# Patient Record
Sex: FEMALE | Race: WHITE | Employment: FULL TIME | URBAN - METROPOLITAN AREA
[De-identification: names, ages, dates, MRNs, and addresses within clinical notes are randomized per-mention and may not be internally consistent; named-entity substitution may affect disease eponyms.]

---

## 2017-01-07 DIAGNOSIS — E03.9 HYPOTHYROIDISM, UNSPECIFIED TYPE: Primary | ICD-10-CM

## 2017-01-09 ENCOUNTER — TELEPHONE (OUTPATIENT)
Dept: OBGYN | Facility: CLINIC | Age: 47
End: 2017-01-09

## 2017-01-09 RX ORDER — LEVOTHYROXINE SODIUM 88 UG/1
TABLET ORAL
Qty: 90 TABLET | Refills: 0 | Status: SHIPPED | OUTPATIENT
Start: 2017-01-09 | End: 2017-02-15

## 2017-01-09 NOTE — TELEPHONE ENCOUNTER
Levothyroxine 88mcg      Last Written Prescription Date:  4/14/16  Last Fill Quantity: 90,   # refills: 2  Last Office Visit with [facility] primary care provider:  2/12/16  Future Office visit: 2/15/17    Pt has annual appt scheduled, 3 month supply sent for insurance purposes.

## 2017-01-09 NOTE — TELEPHONE ENCOUNTER
Reason for Call:  Other call back    Detailed comments: Pt taking Microgestin 1.5/30  Daily.  She is getting break through bleeding and nightsweats.  Will you up the dose before she comes in Feb?    Phone Number Patient can be reached at: Home number on file [phone] (home)    Best Time: anytime    Can we leave a detailed message on this number? YES    Call taken on 1/9/2017 at 2:49 PM by Emmy Pantoja

## 2017-01-09 NOTE — TELEPHONE ENCOUNTER
"Pt is stating she has been having breakthrough bleeding and night sweats for 4 weeks. Pt started taking microgestin continuously for at least 6 months, which was working fine until a month ago, pt did not let herself have a period at all during this time. Pt states she has been spotting every day for 4 weeks, with abdominal cramping. Pt denies heavy bleeding, pt states night sweats are \"really bad\" and she waked up \"completley drenched.\" Pt denies heavy bleeding or the passing of clots, spotting is bright red in color and is light. Note routed to Dr. Longoria to review and advise.   "

## 2017-01-10 NOTE — TELEPHONE ENCOUNTER
Spoke to pt and gave her information from Dr. Longoria note below. Pt stated understanding and had no further questions.

## 2017-01-10 NOTE — TELEPHONE ENCOUNTER
The bTB is b/c she's been doing it continuously for this long. Increasing the dose isn't going to fix this. She should stop her ocps for 7 days, allow a withdrawal bleed and start again. She is likely just at a young enough age that menopause for her isn't full blown and that daily ocps aren't going to work for 6-12 months like they do for some. She can take it continuously for as long as her body will allow her to do that and then when she starts have a lot of BTB she needs a week withdrawal. i actually think her nightsweats will imprve if she does this as well. Have her try it and call back if a week breat and then restarting doesn't work

## 2017-02-15 ENCOUNTER — OFFICE VISIT (OUTPATIENT)
Dept: OBGYN | Facility: CLINIC | Age: 47
End: 2017-02-15
Payer: COMMERCIAL

## 2017-02-15 ENCOUNTER — RADIANT APPOINTMENT (OUTPATIENT)
Dept: MAMMOGRAPHY | Facility: CLINIC | Age: 47
End: 2017-02-15
Payer: COMMERCIAL

## 2017-02-15 VITALS
DIASTOLIC BLOOD PRESSURE: 68 MMHG | SYSTOLIC BLOOD PRESSURE: 102 MMHG | BODY MASS INDEX: 27.68 KG/M2 | HEIGHT: 63 IN | WEIGHT: 156.2 LBS | HEART RATE: 66 BPM

## 2017-02-15 DIAGNOSIS — B00.1 HERPES LABIALIS: ICD-10-CM

## 2017-02-15 DIAGNOSIS — N95.1 SYMPTOMS, SUCH AS FLUSHING, SLEEPLESSNESS, HEADACHE, LACK OF CONCENTRATION, ASSOCIATED WITH THE MENOPAUSE: ICD-10-CM

## 2017-02-15 DIAGNOSIS — Z12.31 VISIT FOR SCREENING MAMMOGRAM: ICD-10-CM

## 2017-02-15 DIAGNOSIS — F33.1 MAJOR DEPRESSIVE DISORDER, RECURRENT EPISODE, MODERATE (H): ICD-10-CM

## 2017-02-15 DIAGNOSIS — G43.909 MIGRAINE WITHOUT STATUS MIGRAINOSUS, NOT INTRACTABLE, UNSPECIFIED MIGRAINE TYPE: ICD-10-CM

## 2017-02-15 DIAGNOSIS — Z30.41 ORAL CONTRACEPTIVE PILL SURVEILLANCE: ICD-10-CM

## 2017-02-15 DIAGNOSIS — Z01.419 ENCOUNTER FOR GYNECOLOGICAL EXAMINATION WITHOUT ABNORMAL FINDING: Primary | ICD-10-CM

## 2017-02-15 DIAGNOSIS — E03.9 HYPOTHYROIDISM, UNSPECIFIED TYPE: ICD-10-CM

## 2017-02-15 PROCEDURE — 36415 COLL VENOUS BLD VENIPUNCTURE: CPT | Performed by: OBSTETRICS & GYNECOLOGY

## 2017-02-15 PROCEDURE — G0202 SCR MAMMO BI INCL CAD: HCPCS | Mod: TC

## 2017-02-15 PROCEDURE — 99396 PREV VISIT EST AGE 40-64: CPT | Performed by: OBSTETRICS & GYNECOLOGY

## 2017-02-15 PROCEDURE — 84443 ASSAY THYROID STIM HORMONE: CPT | Performed by: OBSTETRICS & GYNECOLOGY

## 2017-02-15 ASSESSMENT — ANXIETY QUESTIONNAIRES
5. BEING SO RESTLESS THAT IT IS HARD TO SIT STILL: NOT AT ALL
7. FEELING AFRAID AS IF SOMETHING AWFUL MIGHT HAPPEN: NOT AT ALL
1. FEELING NERVOUS, ANXIOUS, OR ON EDGE: NOT AT ALL
6. BECOMING EASILY ANNOYED OR IRRITABLE: NOT AT ALL
IF YOU CHECKED OFF ANY PROBLEMS ON THIS QUESTIONNAIRE, HOW DIFFICULT HAVE THESE PROBLEMS MADE IT FOR YOU TO DO YOUR WORK, TAKE CARE OF THINGS AT HOME, OR GET ALONG WITH OTHER PEOPLE: NOT DIFFICULT AT ALL
GAD7 TOTAL SCORE: 0
3. WORRYING TOO MUCH ABOUT DIFFERENT THINGS: NOT AT ALL
2. NOT BEING ABLE TO STOP OR CONTROL WORRYING: NOT AT ALL

## 2017-02-15 ASSESSMENT — PATIENT HEALTH QUESTIONNAIRE - PHQ9: 5. POOR APPETITE OR OVEREATING: NOT AT ALL

## 2017-02-15 NOTE — PROGRESS NOTES
Shelby is a 46 year old  female who presents for annual exam.     Besides routine health maintenance,  she would like to discuss night sweats and weight gain. Also needs refill on medications.    HPI:  The patient's PCP is Tessa Weaver PA-C.    Patient is doing fairly well. General health is fine. Needs TSH checked today for refill on meds.  Just had fasting labs done through work and though still relatively normal the LDL is a little higher than in years past.  Patient is upset b/c feels like keeps gaining weight. However was this weight 2 yrs ago and then started eating healthy, portion control and exercise and does admit has fallen off from that recently and needs to get back on board.  Patient was having lots of hotflashes and night sweats. Started her on daily cyclic HRT and then daily HRT and that completely resolved her menopausal sx but had lots of DUB. We then put her on junel 1/20 and her menopausal sx weren't as well controlled, mostly night sweats but menses were regular. Bumped her up to . and still getting multiple time a night drenching sweats. Takes her ocp in the morning. Hasn't tried taking it at night yet.  No vaginal dryness or dyspareunia. Still gets daily creamy white d/c but realizes that just her new normal.  Needs refill on her valtrex for cold sores and migraine meds as well.  Mood is fine on 150mg sertraline but just feels really low energy. Not sure how much is her RA or her weight gain, or menopause and poor sleep.   Her daughters are doing fine and her 2 stepkids as well.      GYNECOLOGIC HISTORY:    Patient's last menstrual period was 2017 (exact date).  Her current contraception method is: oral contraceptives and Essure.  She  reports that she has never smoked. She has never used smokeless tobacco.    Patient is sexually active.  STD testing offered?  Declined  Last PHQ-9 score on record =   PHQ-9 SCORE 2/15/2017   Total Score -   Total Score 5     Last GAD7  score on record =   MOOSE-7 SCORE 2/15/2017   Total Score 0     Alcohol Score = 2    HEALTH MAINTENANCE:  Cholesterol: 16  Total= 214, Triglycerides=89, HDL=63, EYO=762, FBS=88, TSH=16-0.96 -gets lipids/fbs done at work. Thyroid testing today  Last Mammo: 16, Result: normal, Next Mammo: today   Pap: 14 neg, HPV-  Colonoscopy:  NA  Dexa:  Never    Health maintenance updated:  yes    HISTORY:  Obstetric History       T2      TAB0   SAB0   E0   M0   L2       # Outcome Date GA Lbr Ramu/2nd Weight Sex Delivery Anes PTL Lv   2 Term 89    F    Y   1 Term 87    F    Y          Patient Active Problem List   Diagnosis     Hypothyroidism     CARDIOVASCULAR SCREENING; LDL GOAL LESS THAN 160     Cluster headache     Anxiety     Mild major depression (H)     Dizziness     Low back pain     Hyperhidrosis of soles     AS (ankylosing spondylitis) (H)     Abdominal pain, lower     Herpes labialis     Major depression     Sjogren's syndrome (H)     TMJ (temporomandibular joint syndrome)     Herpes zoster     OA (osteoarthritis)     Post-menopause on HRT (hormone replacement therapy)     Symptoms, such as flushing, sleeplessness, headache, lack of concentration, associated with the menopause     Past Surgical History   Procedure Laterality Date     Mammoplasty augmentation       Rotator cuff repair rt/lt       Hc hysteros w permanent fallopain implant  2010     By Dr. Longoria     Hc hysterosalpingogram  12/28/10     post essure bilateral tubal occlusion      Social History   Substance Use Topics     Smoking status: Never Smoker     Smokeless tobacco: Never Used     Alcohol use 0.0 oz/week     0 Standard drinks or equivalent per week      Comment: occ       Problem (# of Occurrences) Relation (Name,Age of Onset)    CANCER (1) Brother: panc ca    Coronary Artery Disease (1) Father: mi, PVD     DIABETES (1) Maternal Grandfather    Hypertension (2) Father, Mother    Other  "Cancer (1) Father: skin ca    Rheumatoid Arthritis (2) Father, Sister: passed away    Substance Abuse (1) Brother: alcohol    Thyroid Disease (1) Maternal Grandfather            Current Outpatient Prescriptions   Medication Sig     norethindrone-ethinyl estradiol (MICROGESTIN 1.5/30) 1.5-30 MG-MCG per tablet Take 1 tablet by mouth daily Continuously     levothyroxine (SYNTHROID/LEVOTHROID) 88 MCG tablet TAKE 1 TABLET(88 MCG) BY MOUTH DAILY     sertraline (ZOLOFT) 100 MG tablet Take 1.5 tablets (150 mg) by mouth daily     SUMAtriptan (IMITREX) 100 MG tablet Take 1 tablet (100 mg) by mouth at onset of headache     valACYclovir (VALTREX) 1000 mg tablet Take 2 tablets (2,000 mg) by mouth 2 times daily     fluticasone (FLONASE) 50 MCG/ACT nasal spray Spray 1-2 sprays into both nostrils daily     cevimeline (EVOXAC) 30 MG capsule Take 30 mg by mouth 3 times daily as needed     omeprazole 20 MG tablet Take 20 mg by mouth daily     Cholecalciferol (VITAMIN D PO)      No current facility-administered medications for this visit.      Allergies   Allergen Reactions     Penicillins Rash       Past medical, surgical, social and family histories were reviewed and updated in EPIC.    ROS:   12 point review of systems negative other than symptoms noted below.  Constitutional: Weight Gain  Genitourinary: Hot Flashes and Night Sweats    EXAM:  /68  Pulse 66  Ht 5' 3\" (1.6 m)  Wt 156 lb 3.2 oz (70.9 kg)  LMP 01/13/2017 (Exact Date)  BMI 27.67 kg/m2   BMI: Body mass index is 27.67 kg/(m^2).    PHYSICAL EXAM:  Constitutional:  Appearance: Well nourished, well developed, alert, in no acute distress  Neck:  Lymph Nodes:  No lymphadenopathy present    Thyroid:  Gland size normal, nontender, no nodules or masses present  on palpation  Chest:  Respiratory Effort:  Breathing unlabored  Cardiovascular:    Heart: Auscultation:  Regular rate, normal rhythm, no murmurs present  Breasts: Inspection of Breasts:  No lymphadenopathy " present    Palpation of Breasts and Axillae:  No masses present on palpation, no  breast tenderness    Axillary Lymph Nodes:  No lymphadenopathy present  Gastrointestinal:   Abdominal Examination:  Abdomen nontender to palpation, tone normal without rigidity or guarding, no masses present, umbilicus without lesions   Liver and Spleen:  No hepatomegaly present, liver nontender to palpation    Hernias:  No hernias present  Lymphatic: Lymph Nodes:  No other lymphadenopathy present  Skin:  General Inspection:  No rashes present, no lesions present, no areas of  discoloration    Genitalia and Groin:  No rashes present, no lesions present, no areas of  discoloration, no masses present  Neurologic/Psychiatric:    Mental Status:  Oriented X3     Pelvic Exam:  External Genitalia:     Normal appearance for age, no discharge present, no tenderness present, no inflammatory lesions present, color normal  Vagina:     Normal vaginal vault without central or paravaginal defects, no discharge present, no inflammatory lesions present, no masses present  Bladder:     Nontender to palpation  Urethra:   Urethral Body:  Urethra palpation normal, urethra structural support normal   Urethral Meatus:  No erythema or lesions present  Cervix:     Appearance healthy, no lesions present, nontender to palpation, no bleeding present  Uterus:     Nontender to palpation, no masses present, position anteflexed, mobility: normal  Adnexa:     No adnexal tenderness present, no adnexal masses present  Perineum:     Perineum within normal limits, no evidence of trauma, no rashes or skin lesions present  Anus:     Anus within normal limits, no hemorrhoids present  Inguinal Lymph Nodes:     No lymphadenopathy present  Pubic Hair:     Normal pubic hair distribution for age  Genitalia and Groin:     No rashes present, no lesions present, no areas of discoloration, no masses present    COUNSELING:   Reviewed preventive health counseling, as reflected in  patient instructions  Special attention given to:        Regular exercise       Healthy diet/nutrition       (Rani)menopause management    BMI: Body mass index is 27.67 kg/(m^2).  Weight management plan: Discussed healthy diet and exercise guidelines and patient will follow up in 12 months in clinic to re-evaluate.    ASSESSMENT:  46 year old female with satisfactory annual exam.    ICD-10-CM    1. Encounter for gynecological examination without abnormal finding Z01.419    2. Hypothyroidism, unspecified type E03.9 levothyroxine (SYNTHROID/LEVOTHROID) 88 MCG tablet     TSH   3. Major depressive disorder, recurrent episode, moderate (H) F33.1 sertraline (ZOLOFT) 100 MG tablet   4. Herpes labialis B00.1 valACYclovir (VALTREX) 1000 mg tablet   5. Migraine without status migrainosus, not intractable, unspecified migraine type G43.909 SUMAtriptan (IMITREX) 100 MG tablet   6. Oral contraceptive pill surveillance Z30.41    7. Symptoms, such as flushing, sleeplessness, headache, lack of concentration, associated with the menopause N95.1        PLAN:  Pap is UTD for 2 more years  mammo today  TSH today and should either repeat fasting labs with us next year or through employer  Will try to take her ocps qhs for the next 7-10 days and see if nightsweats are any better. If they're not will call us and we can either try to do a small dose of vivelle .05mg patch on top of the ocp 2x/week even through placebo week or we could try neurontin at bedtime to not increase our hormones. However patient is fairly young and some HRT on top of ocp should be fine. Did discuss pros/cons of these two approaches.  Refill on valtrex, sertraline and imitrex sent.  Encouraged to resume exercise and will see if with that and improved sleep her energy improves.    Treesa Longoria MD

## 2017-02-15 NOTE — MR AVS SNAPSHOT
After Visit Summary   2/15/2017    Shelby Asencio    MRN: [id number]           Patient Information     Date Of Birth          1970        Visit Information        Provider Department      2/15/2017 2:00 PM Teresa Longoria MD [facility]        Today's Diagnoses     Encounter for gynecological examination without abnormal finding    -  1    Hypothyroidism, unspecified type        Major depressive disorder, recurrent episode, moderate (H)        Herpes labialis        Migraine without status migrainosus, not intractable, unspecified migraine type        Oral contraceptive pill surveillance        Symptoms, such as flushing, sleeplessness, headache, lack of concentration, associated with the menopause           Follow-ups after your visit        Who to contact     If you have questions or need follow up information about today's clinic visit or your schedule please contact [facility] directly at [phone].  Normal or non-critical lab and imaging results will be communicated to you by [employer], letter or phone within 4 business days after the clinic has received the results. If you do not hear from us within 7 days, please contact the clinic through [employer] or phone. If you have a critical or abnormal lab result, we will notify you by phone as soon as possible.  Submit refill requests through [employer] or call your pharmacy and they will forward the refill request to us. Please allow 3 business days for your refill to be completed.          Additional Information About Your Visit        MyChart Information     [employer] gives you secure access to your electronic health record. If you see a primary care provider, you can also send messages to your care team and make appointments. If you have questions, please call your primary care clinic.  If you do not have a primary care provider, please call [phone] and they will assist you.        Care EveryWhere ID  "    This is your Care EveryWhere ID. This could be used by other organizations to access your [place] medical records  [id number]        Your Vitals Were     Pulse Height Last Period BMI (Body Mass Index)          66 5' 3\" (1.6 m) 01/13/2017 (Exact Date) 27.67 kg/m2         Blood Pressure from Last 3 Encounters:   02/15/17 102/68   04/29/16 110/70   02/12/16 112/72    Weight from Last 3 Encounters:   02/15/17 156 lb 3.2 oz (70.9 kg)   04/29/16 146 lb (66.2 kg)   02/12/16 144 lb (65.3 kg)              We Performed the Following     TSH          Today's Medication Changes          These changes are accurate as of: 2/15/17 11:59 PM.  If you have any questions, ask your nurse or doctor.               These medicines have changed or have updated prescriptions.        Dose/Directions    levothyroxine 88 MCG tablet   Commonly known as:  SYNTHROID/LEVOTHROID   This may have changed:  See the new instructions.   Used for:  Hypothyroidism, unspecified type   Changed by:  Teresa Longoria MD        TAKE 1 TABLET(88 MCG) BY MOUTH DAILY   Quantity:  90 tablet   Refills:  3         Stop taking these medicines if you haven't already. Please contact your care team if you have questions.     norethindrone-ethinyl estradiol 1-20 MG-MCG per tablet   Commonly known as:  JUNEL FE 1/20   Stopped by:  Teresa Longoria MD                Where to get your medicines      These medications were sent to [facility] [address]  [address]  [address] [facility] [place] [address]     Phone:  [phone]     levothyroxine 88 MCG tablet    norethindrone-ethinyl estradiol 1.5-30 MG-MCG per tablet    sertraline 100 MG tablet    SUMAtriptan 100 MG tablet    valACYclovir 1000 mg tablet                Primary Care Provider Office Phone # Fax #    Tessa Weaver PA-C [phone]       [facility] [address]  [facility] [address]        Thank you!     Thank you " for choosing [facility]  for your care. Our goal is always to provide you with excellent care. Hearing back from our patients is one way we can continue to improve our services. Please take a few minutes to complete the written survey that you may receive in the mail after your visit with us. Thank you!             Your Updated Medication List - Protect others around you: Learn how to safely use, store and throw away your medicines at www.disposemymeds.org.          This list is accurate as of: 2/15/17 11:59 PM.  Always use your most recent med list.                   Brand Name Dispense Instructions for use    cevimeline 30 MG capsule    EVOXAC     Take 30 mg by mouth 3 times daily as needed       fluticasone 50 MCG/ACT spray    FLONASE    1 Package    Spray 1-2 sprays into both nostrils daily       levothyroxine 88 MCG tablet    SYNTHROID/LEVOTHROID    90 tablet    TAKE 1 TABLET(88 MCG) BY MOUTH DAILY       norethindrone-ethinyl estradiol 1.5-30 MG-MCG per tablet    MICROGESTIN 1.5/30    112 tablet    Take 1 tablet by mouth daily Continuously       omeprazole 20 MG tablet      Take 20 mg by mouth daily       sertraline 100 MG tablet    ZOLOFT    135 tablet    Take 1.5 tablets (150 mg) by mouth daily       SUMAtriptan 100 MG tablet    IMITREX    8 tablet    Take 1 tablet (100 mg) by mouth at onset of headache       valACYclovir 1000 mg tablet    VALTREX    12 tablet    Take 2 tablets (2,000 mg) by mouth 2 times daily       VITAMIN D PO

## 2017-02-16 LAB — TSH SERPL DL<=0.05 MIU/L-ACNC: 1.82 MU/L (ref 0.4–4)

## 2017-02-16 RX ORDER — SUMATRIPTAN 100 MG/1
100 TABLET, FILM COATED ORAL
Qty: 8 TABLET | Refills: 3 | Status: SHIPPED | OUTPATIENT
Start: 2017-02-16 | End: 2019-02-27

## 2017-02-16 RX ORDER — NORETHINDRONE ACETATE AND ETHINYL ESTRADIOL .03; 1.5 MG/1; MG/1
1 TABLET ORAL DAILY
Qty: 112 TABLET | Refills: 3 | Status: SHIPPED | OUTPATIENT
Start: 2017-02-16 | End: 2018-02-21

## 2017-02-16 RX ORDER — VALACYCLOVIR HYDROCHLORIDE 1 G/1
2000 TABLET, FILM COATED ORAL 2 TIMES DAILY
Qty: 12 TABLET | Refills: 6 | Status: SHIPPED | OUTPATIENT
Start: 2017-02-16 | End: 2019-02-27

## 2017-02-16 RX ORDER — SERTRALINE HYDROCHLORIDE 100 MG/1
150 TABLET, FILM COATED ORAL DAILY
Qty: 135 TABLET | Refills: 3 | Status: SHIPPED | OUTPATIENT
Start: 2017-02-16 | End: 2018-02-21

## 2017-02-16 RX ORDER — LEVOTHYROXINE SODIUM 88 UG/1
TABLET ORAL
Qty: 90 TABLET | Refills: 3 | Status: SHIPPED | OUTPATIENT
Start: 2017-02-16 | End: 2018-02-22

## 2017-02-16 ASSESSMENT — ANXIETY QUESTIONNAIRES: GAD7 TOTAL SCORE: 0

## 2017-02-16 ASSESSMENT — PATIENT HEALTH QUESTIONNAIRE - PHQ9: SUM OF ALL RESPONSES TO PHQ QUESTIONS 1-9: 5

## 2017-02-22 ENCOUNTER — TRANSFERRED RECORDS (OUTPATIENT)
Dept: HEALTH INFORMATION MANAGEMENT | Facility: CLINIC | Age: 47
End: 2017-02-22

## 2017-03-21 ENCOUNTER — TELEPHONE (OUTPATIENT)
Dept: NURSING | Facility: CLINIC | Age: 47
End: 2017-03-21

## 2017-03-21 DIAGNOSIS — R23.2 HOT FLASHES: Primary | ICD-10-CM

## 2017-03-21 NOTE — TELEPHONE ENCOUNTER
Pt calling saw Dr. Longoria for her annual in February and informed her that she was having terrible night sweats, she was recommended to take the pill at night, has done so for a month and has not noticed any change, is still waking up at night soaking wet. Routing to Dr. Longoria to see what option she would prefer (note below). Pt indicates she would like to do whichever one has the less risks or side effects. Pt aware Dr. Longoria in surgery today and back tomorrow. Correct pharmacy is selected. Routing to Dr. Longoria.    Annual with Dr. Longoria on 2/15/17:  Will try to take her ocps qhs for the next 7-10 days and see if nightsweats are any better. If they're not will call us and we can either try to do a small dose of vivelle .05mg patch on top of the ocp 2x/week even through placebo week or we could try neurontin at bedtime to not increase our hormones. However patient is fairly young and some HRT on top of ocp should be fine. Did discuss pros/cons of these two approaches.

## 2017-03-22 RX ORDER — ESTRADIOL 0.05 MG/D
1 PATCH, EXTENDED RELEASE TRANSDERMAL
Qty: 24 PATCH | Refills: 0 | Status: SHIPPED | OUTPATIENT
Start: 2017-03-23 | End: 2017-11-15

## 2017-03-22 NOTE — TELEPHONE ENCOUNTER
Let's have her try adding the vivelle .05mg patch 2 patches per week (every 3.5 days) throughout the entire course of her ocps. If has DUB will change the timing and/or the plan in general.

## 2017-03-22 NOTE — TELEPHONE ENCOUNTER
Called pt, informed of note below, Rx sent to pt;s pharmacy. Pt stated understanding and had no further questions.

## 2017-04-08 DIAGNOSIS — E03.9 HYPOTHYROIDISM, UNSPECIFIED TYPE: ICD-10-CM

## 2017-04-10 RX ORDER — LEVOTHYROXINE SODIUM 88 UG/1
TABLET ORAL
Qty: 90 TABLET | Refills: 0 | OUTPATIENT
Start: 2017-04-10

## 2017-04-10 NOTE — TELEPHONE ENCOUNTER
Levothyroxine 88g      Last Written Prescription Date:  2/16/17  Last Fill Quantity: 90,   # refills: 3  Last Office Visit with [facility] primary care provider:  2/15/17  Future Office visit: none    Refill request too soon, Rx denied.

## 2017-04-17 ENCOUNTER — TELEPHONE (OUTPATIENT)
Dept: NURSING | Facility: CLINIC | Age: 47
End: 2017-04-17

## 2017-04-17 DIAGNOSIS — R23.2 HOT FLASHES: Primary | ICD-10-CM

## 2017-04-17 RX ORDER — ESTRADIOL 0.1 MG/D
1 FILM, EXTENDED RELEASE TRANSDERMAL
Qty: 24 PATCH | Refills: 0 | Status: SHIPPED | OUTPATIENT
Start: 2017-04-17 | End: 2017-11-15

## 2017-04-17 NOTE — TELEPHONE ENCOUNTER
Annual 2/15/17. Pt was started on the estradiol (VIVELLE-DOT) 0.05 MG/24HR BIW patch. Pt stated it helped at first but is not helping her symptoms at all.  Night sweats and hot flashes are back. Pt wondering if dose of patch could be increased. Routing to Dr. Longoria. Please review and advise.   2/15/17 Will try to take her ocps qhs for the next 7-10 days and see if nightsweats are any better. If they're not will call us and we can either try to do a small dose of vivelle .05mg patch on top of the ocp 2x/week even through placebo week or we could try neurontin at bedtime to not increase our hormones. However patient is fairly young and some HRT on top of ocp should be fine. Did discuss pros/cons of these two approaches.

## 2017-05-31 ENCOUNTER — TRANSFERRED RECORDS (OUTPATIENT)
Dept: HEALTH INFORMATION MANAGEMENT | Facility: CLINIC | Age: 47
End: 2017-05-31

## 2017-06-06 ENCOUNTER — TELEPHONE (OUTPATIENT)
Dept: NURSING | Facility: CLINIC | Age: 47
End: 2017-06-06

## 2017-06-06 DIAGNOSIS — N95.1 SYMPTOMS, SUCH AS FLUSHING, SLEEPLESSNESS, HEADACHE, LACK OF CONCENTRATION, ASSOCIATED WITH THE MENOPAUSE: Primary | ICD-10-CM

## 2017-06-06 RX ORDER — ESTERIFIED ESTROGEN AND METHYLTESTOSTERONE 1.25; 2.5 MG/1; MG/1
1 TABLET ORAL DAILY
Qty: 30 TABLET | Refills: 1 | Status: SHIPPED | OUTPATIENT
Start: 2017-06-06 | End: 2017-11-15

## 2017-06-06 RX ORDER — MEDROXYPROGESTERONE ACETATE 5 MG
5 TABLET ORAL DAILY
Qty: 30 TABLET | Refills: 1 | Status: SHIPPED | OUTPATIENT
Start: 2017-06-06 | End: 2017-11-15

## 2017-06-06 NOTE — TELEPHONE ENCOUNTER
Pt was switched to estradiol (VIVELLE-DOT) 0.1 MG/24HR BIW patch and is taking norethindrone-ethinyl estradiol (MICROGESTIN 1.5/30) 1.5-30 MG-MCG per tablet. Pt is still suffering from night sweats. Waking atleast 5 times per night with sweats. Estradiol helped for a couple of weeks but night sweats are back. OK to leave message. Routing to Dr. Longoria. Please review and advise.

## 2017-06-06 NOTE — TELEPHONE ENCOUNTER
Let's have her stop the ocps and patch and start on estratest full strength one po qday and then provera 5mg po qday and give that a month and see if that helps. Adding testosterone to estrogen can help with refractory nightsweats.

## 2017-06-06 NOTE — TELEPHONE ENCOUNTER
Sent both erx. As long as she's had a couple days worth of her period can start them both now. Usually would just wait 2-3 worth of bleeding, or at least get past the heaviest part of it.

## 2017-06-06 NOTE — TELEPHONE ENCOUNTER
Patient is having a period now,  She is wondering if she should just start these regardless of where she is at with her bleeding. Also can you please print out the estratest and e-scribe the provera. Thanks. Patient aware this may not be till tomorrow as Dr. Longoria in/out today for surgery.

## 2017-06-20 ENCOUNTER — TELEPHONE (OUTPATIENT)
Dept: NURSING | Facility: CLINIC | Age: 47
End: 2017-06-20

## 2017-06-20 DIAGNOSIS — R61 NIGHT SWEATS: Primary | ICD-10-CM

## 2017-06-20 RX ORDER — GABAPENTIN 100 MG/1
100 CAPSULE ORAL AT BEDTIME
Qty: 60 CAPSULE | Refills: 0 | Status: SHIPPED | OUTPATIENT
Start: 2017-06-20 | End: 2017-11-15

## 2017-06-20 NOTE — TELEPHONE ENCOUNTER
Has she tried neurontin at any point? She can do her ocps and then slowly titrate up from 100mg to 300mg nightly on neurontin. Increase by 100mg every 3-4 days. That may give her help on nightsweats and allow the ocp to better control the bleeding issue

## 2017-06-20 NOTE — TELEPHONE ENCOUNTER
"Pt calling in to let Dr Longoria that she stopped taking the Provera 5mg because she had on going spotting to heavy period like bleeding for 2 weeks since she started the new med. She started taking the Microgestin 1.5/30 again and the bleeding has stopped. \"I would rather have the night sweats than deal with the bleeding.\"  Is there any other option for the night sweats she could try?  Route to Dr Longoria to advise.  "

## 2017-07-26 ENCOUNTER — TRANSFERRED RECORDS (OUTPATIENT)
Dept: HEALTH INFORMATION MANAGEMENT | Facility: CLINIC | Age: 47
End: 2017-07-26

## 2017-07-26 LAB
ALT SERPL-CCNC: 46 IU/L (ref 5–35)
AST SERPL-CCNC: 37 U/L (ref 5–34)
CREAT SERPL-MCNC: 0.83 MG/DL (ref 0.5–1.3)
GFR SERPL CREATININE-BSD FRML MDRD: 78 ML/MIN/1.73M2

## 2017-10-18 ENCOUNTER — TRANSFERRED RECORDS (OUTPATIENT)
Dept: HEALTH INFORMATION MANAGEMENT | Facility: CLINIC | Age: 47
End: 2017-10-18

## 2017-10-18 LAB
ALT SERPL-CCNC: 15 IU/L (ref 5–35)
AST SERPL-CCNC: 25 U/L (ref 5–34)
CREAT SERPL-MCNC: 0.61 MG/DL (ref 0.5–1.3)
GFR SERPL CREATININE-BSD FRML MDRD: 110.8 ML/MIN/1.73M2

## 2017-11-15 ENCOUNTER — OFFICE VISIT (OUTPATIENT)
Dept: FAMILY MEDICINE | Facility: CLINIC | Age: 47
End: 2017-11-15
Payer: COMMERCIAL

## 2017-11-15 VITALS
HEIGHT: 63 IN | OXYGEN SATURATION: 98 % | HEART RATE: 77 BPM | DIASTOLIC BLOOD PRESSURE: 73 MMHG | BODY MASS INDEX: 27.29 KG/M2 | WEIGHT: 154 LBS | SYSTOLIC BLOOD PRESSURE: 111 MMHG | TEMPERATURE: 98.4 F

## 2017-11-15 DIAGNOSIS — J06.9 VIRAL URI WITH COUGH: Primary | ICD-10-CM

## 2017-11-15 DIAGNOSIS — G44.209 TENSION HEADACHE: ICD-10-CM

## 2017-11-15 PROCEDURE — 99213 OFFICE O/P EST LOW 20 MIN: CPT | Performed by: NURSE PRACTITIONER

## 2017-11-15 RX ORDER — CODEINE PHOSPHATE AND GUAIFENESIN 10; 100 MG/5ML; MG/5ML
1-2 SOLUTION ORAL
Qty: 120 ML | Refills: 0 | Status: SHIPPED | OUTPATIENT
Start: 2017-11-15 | End: 2018-02-21

## 2017-11-15 RX ORDER — CYCLOBENZAPRINE HCL 10 MG
5-10 TABLET ORAL 2 TIMES DAILY PRN
Qty: 30 TABLET | Refills: 0 | Status: SHIPPED | OUTPATIENT
Start: 2017-11-15 | End: 2021-09-16

## 2017-11-15 ASSESSMENT — PATIENT HEALTH QUESTIONNAIRE - PHQ9: SUM OF ALL RESPONSES TO PHQ QUESTIONS 1-9: 2

## 2017-11-15 NOTE — MR AVS SNAPSHOT
"              After Visit Summary   11/15/2017    Shelby Asencio    MRN: [id number]           Patient Information     Date Of Birth          1970        Visit Information        Provider Department      11/15/2017 11:00 AM Guido Tapia APRN CNP [facility]        Today's Diagnoses     Viral URI with cough    -  1    Tension headache           Follow-ups after your visit        Who to contact     If you have questions or need follow up information about today's clinic visit or your schedule please contact [facility] directly at [phone].  Normal or non-critical lab and imaging results will be communicated to you by [employer], letter or phone within 4 business days after the clinic has received the results. If you do not hear from us within 7 days, please contact the clinic through [employer] or phone. If you have a critical or abnormal lab result, we will notify you by phone as soon as possible.  Submit refill requests through [employer] or call your pharmacy and they will forward the refill request to us. Please allow 3 business days for your refill to be completed.          Additional Information About Your Visit        MyChart Information     [employer] gives you secure access to your electronic health record. If you see a primary care provider, you can also send messages to your care team and make appointments. If you have questions, please call your primary care clinic.  If you do not have a primary care provider, please call [phone] and they will assist you.        Care EveryWhere ID     This is your Care EveryWhere ID. This could be used by other organizations to access your [facility] medical records  [id number]        Your Vitals Were     Pulse Temperature Height Pulse Oximetry BMI (Body Mass Index)       77 98.4  F (36.9  C) (Oral) 5' 3\" (1.6 m) 98% 27.28 kg/m2        Blood Pressure from Last 3 Encounters:   11/15/17 111/73   02/15/17 102/68   04/29/16 110/70    " Weight from Last 3 Encounters:   11/15/17 154 lb (69.9 kg)   02/15/17 156 lb 3.2 oz (70.9 kg)   04/29/16 146 lb (66.2 kg)              Today, you had the following     No orders found for display         Today's Medication Changes          These changes are accurate as of: 11/15/17 11:59 PM.  If you have any questions, ask your nurse or doctor.               Start taking these medicines.        Dose/Directions    cyclobenzaprine 10 MG tablet   Commonly known as:  FLEXERIL   Used for:  Tension headache   Started by:  Guido Tapia APRN CNP        Dose:  5-10 mg   Take 0.5-1 tablets (5-10 mg) by mouth 2 times daily as needed for muscle spasms   Quantity:  30 tablet   Refills:  0       guaiFENesin-codeine 100-10 MG/5ML Soln solution   Commonly known as:  ROBITUSSIN AC   Used for:  Viral URI with cough   Started by:  Guido Tapia APRN CNP        Dose:  1-2 tsp.   Take 5-10 mLs by mouth nightly as needed   Quantity:  120 mL   Refills:  0         These medicines have changed or have updated prescriptions.        Dose/Directions    valACYclovir 1000 mg tablet   Commonly known as:  VALTREX   This may have changed:    - when to take this  - reasons to take this   Used for:  Herpes labialis        Dose:  2000 mg   Take 2 tablets (2,000 mg) by mouth 2 times daily   Quantity:  12 tablet   Refills:  6            Where to get your medicines      [id number]  [address] [facility] [facility] [address]  [address], [place] [address]     Phone:  [phone]     cyclobenzaprine 10 MG tablet         Some of these will need a paper prescription and others can be bought over the counter.  Ask your nurse if you have questions.     Bring a paper prescription for each of these medications     guaiFENesin-codeine 100-10 MG/5ML Soln solution                Primary Care Provider Office Phone # Fax #    Tessa Weaver PA-C [phone]        [address] [facility] [address]  [facility] [address]        Equal Access to Services     JULIA HERNANDEZ : Hadii aad ku hadronaldo Sowilliamsali, waaxda luqadaha, qaybta kaalmada kleverflorenciaarchana, amy gongora gabocirilo galeanaanaliish valdez. So [facility] [phone].    ATENCIÓN: Si habla español, tiene a escudero disposición servicios gratuitos de asistencia lingüística. Llame al [phone].    We comply with applicable federal civil rights laws and [place] laws. We do not discriminate on the basis of race, color, national origin, age, disability, sex, sexual orientation, or gender identity.            Thank you!     Thank you for choosing [facility]  for your care. Our goal is always to provide you with excellent care. Hearing back from our patients is one way we can continue to improve our services. Please take a few minutes to complete the written survey that you may receive in the mail after your visit with us. Thank you!             Your Updated Medication List - Protect others around you: Learn how to safely use, store and throw away your medicines at www.disposemymeds.org.          This list is accurate as of: 11/15/17 11:59 PM.  Always use your most recent med list.                   Brand Name Dispense Instructions for use Diagnosis    cevimeline 30 MG capsule    EVOXAC     Take 30 mg by mouth 3 times daily as needed        cyclobenzaprine 10 MG tablet    FLEXERIL    30 tablet    Take 0.5-1 tablets (5-10 mg) by mouth 2 times daily as needed for muscle spasms    Tension headache       fluticasone 50 MCG/ACT spray    FLONASE    1 Package    Spray 1-2 sprays into both nostrils daily    Dysfunction of Eustachian tube, bilateral       guaiFENesin-codeine 100-10 MG/5ML Soln solution    ROBITUSSIN AC    120 mL    Take 5-10 mLs by mouth nightly as needed    Viral URI with cough       HUMIRA 10 MG/0.2ML prefilled syringe kit   Generic drug:  adalimumab      Inject 10 mg Subcutaneous every 14 days        levothyroxine 88 MCG tablet     SYNTHROID/LEVOTHROID    90 tablet    TAKE 1 TABLET(88 MCG) BY MOUTH DAILY    Hypothyroidism, unspecified type       norethindrone-ethinyl estradiol 1.5-30 MG-MCG per tablet    MICROGESTIN 1.5/30    112 tablet    Take 1 tablet by mouth daily Continuously        omeprazole 20 MG tablet      Take 20 mg by mouth daily        sertraline 100 MG tablet    ZOLOFT    135 tablet    Take 1.5 tablets (150 mg) by mouth daily    Major depressive disorder, recurrent episode, moderate (H)       SUMAtriptan 100 MG tablet    IMITREX    8 tablet    Take 1 tablet (100 mg) by mouth at onset of headache    Migraine without status migrainosus, not intractable, unspecified migraine type       valACYclovir 1000 mg tablet    VALTREX    12 tablet    Take 2 tablets (2,000 mg) by mouth 2 times daily    Herpes labialis       VITAMIN D PO

## 2017-11-15 NOTE — PROGRESS NOTES
HPI      SUBJECTIVE:   Shelby Asencio is a 47 year old female who presents to clinic today for the following health issues:      RESPIRATORY SYMPTOMS      Duration: 10 days    Description  nasal congestion, cough, headache, fatigue/malaise and hoarse voice    Severity: moderate    Accompanying signs and symptoms: None    History (predisposing factors):  none    Precipitating or alleviating factors: None    Therapies tried and outcome:  oral decongestant and guaifenesin      10 days of URI symptoms that started with a sore throat   Productive cough   Lots of nasal drainage that is slightly green   No fevers but early on had mild sweats   Uses saline nasal spray nightly   Is feeling somewhat better       Past Medical History:   Diagnosis Date     Abnormal Pap smear of cervix     +HPV s/p colp x 2     Arthritis      AS (ankylosing spondylitis) (H) 6/10/2013    Dr. Sloan     KENDY I (cervical intraepithelial neoplasia I) 1/11    KENDY I at 11 o'clock     Cluster headache      Cold sore 3/2009     Condyloma acuminata 10/6/10 11/24/10, 12/10    Excision of larger condyloma and TCA with Von in oct.  TCA of perineal condyloma with Deon in nov. and again with Von 12/10     Herpes labialis 2013    HSV 2, was neg 2012. then positive at the hospital 1/13 and then on repeat here 2 weeks after that 2/6/13 was neg again. as well as June 2013 still neg.     Hypothyroidism      Major depression     aakash lashanda following celexa     OA (osteoarthritis)     bilat great toes, TCO     Post-menopause on HRT (hormone replacement therapy) 2/13/2016     Sjogren's syndrome (H) 1/2014    with dry eyes and mouth     Symptoms, such as flushing, sleeplessness, headache, lack of concentration, associated with the menopause 2/13/2016     Past Surgical History:   Procedure Laterality Date     HC HYSTEROS W PERMANENT FALLOPAIN IMPLANT  8/30/2010    By Dr. Longoria     HC HYSTEROSALPINGOGRAM  12/28/10    post essure bilateral tubal  "occlusion     MAMMOPLASTY AUGMENTATION  2008     ROTATOR CUFF REPAIR RT/LT  4/12     Social History   Substance Use Topics     Smoking status: Never Smoker     Smokeless tobacco: Never Used     Alcohol use 0.0 oz/week     0 Standard drinks or equivalent per week      Comment: occ      Current Outpatient Prescriptions   Medication Sig Dispense Refill     adalimumab (HUMIRA) 10 MG/0.2ML prefilled syringe kit Inject 10 mg Subcutaneous every 14 days       norethindrone-ethinyl estradiol (MICROGESTIN 1.5/30) 1.5-30 MG-MCG per tablet Take 1 tablet by mouth daily Continuously 112 tablet 3     levothyroxine (SYNTHROID/LEVOTHROID) 88 MCG tablet TAKE 1 TABLET(88 MCG) BY MOUTH DAILY 90 tablet 3     sertraline (ZOLOFT) 100 MG tablet Take 1.5 tablets (150 mg) by mouth daily 135 tablet 3     SUMAtriptan (IMITREX) 100 MG tablet Take 1 tablet (100 mg) by mouth at onset of headache 8 tablet 3     valACYclovir (VALTREX) 1000 mg tablet Take 2 tablets (2,000 mg) by mouth 2 times daily (Patient taking differently: Take 2,000 mg by mouth 2 times daily as needed ) 12 tablet 6     fluticasone (FLONASE) 50 MCG/ACT nasal spray Spray 1-2 sprays into both nostrils daily 1 Package 11     cevimeline (EVOXAC) 30 MG capsule Take 30 mg by mouth 3 times daily as needed       omeprazole 20 MG tablet Take 20 mg by mouth daily       Cholecalciferol (VITAMIN D PO)        Allergies   Allergen Reactions     Penicillins Rash       Reviewed and updated as needed this visit by clinical staff and provider      ROS  Detailed as above       /73 (BP Location: Right arm, Cuff Size: Adult Regular)  Pulse 77  Temp 98.4  F (36.9  C) (Oral)  Ht 5' 3\" (1.6 m)  Wt 154 lb (69.9 kg)  SpO2 98%  BMI 27.28 kg/m2      Physical Exam   Constitutional: She is well-developed, well-nourished, and in no distress.   HENT:   Head: Normocephalic.   Right Ear: Tympanic membrane, external ear and ear canal normal.   Left Ear: Tympanic membrane, external ear and ear canal " normal.   Nose: Mucosal edema (right) present. Right sinus exhibits no maxillary sinus tenderness and no frontal sinus tenderness. Left sinus exhibits no maxillary sinus tenderness and no frontal sinus tenderness.   Mouth/Throat: Oropharynx is clear and moist. No oropharyngeal exudate.   Eyes: Conjunctivae are normal.   Neck: Normal range of motion.   Cardiovascular: Normal rate, regular rhythm and normal heart sounds.    No murmur heard.  Pulmonary/Chest: Effort normal and breath sounds normal. No respiratory distress.   Dry cough   Lymphadenopathy:     She has no cervical adenopathy.   Neurological: She is alert.   Skin: Skin is warm and dry.   Psychiatric: Mood and affect normal.   Vitals reviewed.      Assessment and Plan:       ICD-10-CM    1. Viral URI with cough J06.9 guaiFENesin-codeine (ROBITUSSIN AC) 100-10 MG/5ML SOLN solution    B97.89    2. Tension headache G44.209 cyclobenzaprine (FLEXERIL) 10 MG tablet       Suspect viral. Continue Symptomatic treatments   Sent with Jules AC for night  Continue with OTC Symptomatic treatments prn   Also sent with flexeril for tension HA as she has used this in the past with good relief. She uses it sparingly.   Call or rtc prn       URSULA Kincaid, CNP  [facility]

## 2017-12-20 ENCOUNTER — HOSPITAL ENCOUNTER (OUTPATIENT)
Dept: BONE DENSITY | Facility: CLINIC | Age: 47
Discharge: HOME | End: 2017-12-20
Attending: INTERNAL MEDICINE | Admitting: INTERNAL MEDICINE
Payer: COMMERCIAL

## 2017-12-20 DIAGNOSIS — Z13.820 SCREENING FOR OSTEOPOROSIS: ICD-10-CM

## 2017-12-20 PROCEDURE — 77080 DXA BONE DENSITY AXIAL: CPT

## 2018-02-21 ENCOUNTER — RADIANT APPOINTMENT (OUTPATIENT)
Dept: MAMMOGRAPHY | Facility: CLINIC | Age: 48
End: 2018-02-21
Payer: COMMERCIAL

## 2018-02-21 ENCOUNTER — OFFICE VISIT (OUTPATIENT)
Dept: OBGYN | Facility: CLINIC | Age: 48
End: 2018-02-21
Payer: COMMERCIAL

## 2018-02-21 VITALS
BODY MASS INDEX: 27.11 KG/M2 | SYSTOLIC BLOOD PRESSURE: 112 MMHG | HEIGHT: 63 IN | WEIGHT: 153 LBS | DIASTOLIC BLOOD PRESSURE: 64 MMHG

## 2018-02-21 DIAGNOSIS — B00.1 HERPES LABIALIS: ICD-10-CM

## 2018-02-21 DIAGNOSIS — E03.9 HYPOTHYROIDISM, UNSPECIFIED TYPE: ICD-10-CM

## 2018-02-21 DIAGNOSIS — N95.1 PERIMENOPAUSAL VASOMOTOR SYMPTOMS: ICD-10-CM

## 2018-02-21 DIAGNOSIS — Z01.419 ENCOUNTER FOR GYNECOLOGICAL EXAMINATION WITHOUT ABNORMAL FINDING: Primary | ICD-10-CM

## 2018-02-21 DIAGNOSIS — Z12.31 VISIT FOR SCREENING MAMMOGRAM: ICD-10-CM

## 2018-02-21 DIAGNOSIS — F33.40 DEPRESSION, MAJOR, RECURRENT, IN REMISSION (H): ICD-10-CM

## 2018-02-21 DIAGNOSIS — F41.9 ANXIETY: ICD-10-CM

## 2018-02-21 DIAGNOSIS — F33.1 MAJOR DEPRESSIVE DISORDER, RECURRENT EPISODE, MODERATE (H): ICD-10-CM

## 2018-02-21 PROCEDURE — 99213 OFFICE O/P EST LOW 20 MIN: CPT | Mod: 25 | Performed by: OBSTETRICS & GYNECOLOGY

## 2018-02-21 PROCEDURE — 99396 PREV VISIT EST AGE 40-64: CPT | Performed by: OBSTETRICS & GYNECOLOGY

## 2018-02-21 PROCEDURE — 77067 SCR MAMMO BI INCL CAD: CPT | Mod: TC

## 2018-02-21 PROCEDURE — 84443 ASSAY THYROID STIM HORMONE: CPT | Performed by: OBSTETRICS & GYNECOLOGY

## 2018-02-21 PROCEDURE — 36415 COLL VENOUS BLD VENIPUNCTURE: CPT | Performed by: OBSTETRICS & GYNECOLOGY

## 2018-02-21 RX ORDER — VALACYCLOVIR HYDROCHLORIDE 1 G/1
2000 TABLET, FILM COATED ORAL 2 TIMES DAILY
Qty: 12 TABLET | Refills: 6 | Status: CANCELLED | OUTPATIENT
Start: 2018-02-21

## 2018-02-21 RX ORDER — SERTRALINE HYDROCHLORIDE 100 MG/1
150 TABLET, FILM COATED ORAL DAILY
Qty: 135 TABLET | Refills: 3 | Status: SHIPPED | OUTPATIENT
Start: 2018-02-21 | End: 2019-02-27

## 2018-02-21 RX ORDER — SUMATRIPTAN 100 MG/1
100 TABLET, FILM COATED ORAL
Qty: 8 TABLET | Refills: 3 | Status: CANCELLED | OUTPATIENT
Start: 2018-02-21

## 2018-02-21 RX ORDER — NORETHINDRONE ACETATE AND ETHINYL ESTRADIOL .03; 1.5 MG/1; MG/1
1 TABLET ORAL DAILY
Qty: 112 TABLET | Refills: 3 | Status: SHIPPED | OUTPATIENT
Start: 2018-02-21 | End: 2019-02-27

## 2018-02-21 RX ORDER — ESTRADIOL 0.1 MG/D
1 FILM, EXTENDED RELEASE TRANSDERMAL
Qty: 24 PATCH | Refills: 3 | Status: SHIPPED | OUTPATIENT
Start: 2018-02-22 | End: 2018-07-19

## 2018-02-21 RX ORDER — LEVOTHYROXINE SODIUM 88 UG/1
TABLET ORAL
Qty: 90 TABLET | Refills: 3 | Status: CANCELLED | OUTPATIENT
Start: 2018-02-21

## 2018-02-21 NOTE — MR AVS SNAPSHOT
After Visit Summary   2/21/2018    Shelby Asencio    MRN: [id number]           Patient Information     Date Of Birth          1970        Visit Information        Provider Department      2/21/2018 1:20 PM Teresa Longoria MD [facility]        Today's Diagnoses     Encounter for gynecological examination without abnormal finding    -  1    Hypothyroidism, unspecified type        Major depressive disorder, recurrent episode, moderate (H)        Perimenopausal vasomotor symptoms        Herpes labialis        Depression, major, recurrent, in remission (H)        Anxiety           Follow-ups after your visit        Who to contact     If you have questions or need follow up information about today's clinic visit or your schedule please contact [facility] directly at [phone].  Normal or non-critical lab and imaging results will be communicated to you by [employer], letter or phone within 4 business days after the clinic has received the results. If you do not hear from us within 7 days, please contact the clinic through [employer] or phone. If you have a critical or abnormal lab result, we will notify you by phone as soon as possible.  Submit refill requests through [employer] or call your pharmacy and they will forward the refill request to us. Please allow 3 business days for your refill to be completed.          Additional Information About Your Visit        MyChart Information     [employer] gives you secure access to your electronic health record. If you see a primary care provider, you can also send messages to your care team and make appointments. If you have questions, please call your primary care clinic.  If you do not have a primary care provider, please call [phone] and they will assist you.        Care EveryWhere ID     This is your Care EveryWhere ID. This could be used by other organizations to access your [facility]  "records  [id number]        Your Vitals Were     Height Last Period BMI (Body Mass Index)             5' 3\" (1.6 m) 02/16/2018 27.1 kg/m2          Blood Pressure from Last 3 Encounters:   02/21/18 112/64   11/15/17 111/73   02/15/17 102/68    Weight from Last 3 Encounters:   02/21/18 153 lb (69.4 kg)   11/15/17 154 lb (69.9 kg)   02/15/17 156 lb 3.2 oz (70.9 kg)              We Performed the Following     TSH with free T4 reflex          Today's Medication Changes          These changes are accurate as of 2/21/18  6:53 PM.  If you have any questions, ask your nurse or doctor.               Start taking these medicines.        Dose/Directions    estradiol 0.1 MG/24HR BIW patch   Commonly known as:  VIVELLE-DOT   Used for:  Perimenopausal vasomotor symptoms   Started by:  Teresa Longoria MD        Dose:  1 patch   Start taking on:  2/22/2018   Place 1 patch onto the skin twice a week   Quantity:  24 patch   Refills:  3         These medicines have changed or have updated prescriptions.        Dose/Directions    valACYclovir 1000 mg tablet   Commonly known as:  VALTREX   This may have changed:    - when to take this  - reasons to take this   Used for:  Herpes labialis        Dose:  2000 mg   Take 2 tablets (2,000 mg) by mouth 2 times daily   Quantity:  12 tablet   Refills:  6            Where to get your medicines      [facility] [id number] [address] [facility] AT [facility] [place] [address]     Phone:  [phone]     estradiol 0.1 MG/24HR BIW patch    norethindrone-ethinyl estradiol 1.5-30 MG-MCG per tablet    sertraline 100 MG tablet                Primary Care Provider Office Phone # Fax #    Tessa Weaver PA-C [phone] [address]  [place] [address]        Equal Access to Services     JULIA HERNANDEZ AH: Hadii forrest Rodriguez, wagustaboda karen, qamacario jacome, [facility] suein " jenny kleverjed kervinphilip adrian ah. So [facility] [phone].    ATENCIÓN: Si barriela watson, tiene a escudero disposición servicios gratuitos de asistencia lingüística. Luisa mix [phone].    We comply with applicable federal civil rights laws and [place] laws. We do not discriminate on the basis of race, color, national origin, age, disability, sex, sexual orientation, or gender identity.            Thank you!     Thank you for choosing [facility]  for your care. Our goal is always to provide you with excellent care. Hearing back from our patients is one way we can continue to improve our services. Please take a few minutes to complete the written survey that you may receive in the mail after your visit with us. Thank you!             Your Updated Medication List - Protect others around you: Learn how to safely use, store and throw away your medicines at www.disposemymeds.org.          This list is accurate as of 2/21/18  6:53 PM.  Always use your most recent med list.                   Brand Name Dispense Instructions for use Diagnosis    cevimeline 30 MG capsule    EVOXAC     Take 30 mg by mouth 3 times daily as needed        cyclobenzaprine 10 MG tablet    FLEXERIL    30 tablet    Take 0.5-1 tablets (5-10 mg) by mouth 2 times daily as needed for muscle spasms    Tension headache       estradiol 0.1 MG/24HR BIW patch   Start taking on:  2/22/2018    VIVELLE-DOT    24 patch    Place 1 patch onto the skin twice a week    Perimenopausal vasomotor symptoms       HUMIRA 10 MG/0.2ML prefilled syringe kit   Generic drug:  adalimumab      Inject 10 mg Subcutaneous every 14 days        levothyroxine 88 MCG tablet    SYNTHROID/LEVOTHROID    90 tablet    TAKE 1 TABLET(88 MCG) BY MOUTH DAILY    Hypothyroidism, unspecified type       norethindrone-ethinyl estradiol 1.5-30 MG-MCG per tablet    MICROGESTIN 1.5/30    112 tablet    Take 1 tablet by mouth daily Continuously    Perimenopausal vasomotor symptoms        omeprazole 20 MG tablet      Take 20 mg by mouth daily        sertraline 100 MG tablet    ZOLOFT    135 tablet    Take 1.5 tablets (150 mg) by mouth daily    Major depressive disorder, recurrent episode, moderate (H), Anxiety       SUMAtriptan 100 MG tablet    IMITREX    8 tablet    Take 1 tablet (100 mg) by mouth at onset of headache    Migraine without status migrainosus, not intractable, unspecified migraine type       valACYclovir 1000 mg tablet    VALTREX    12 tablet    Take 2 tablets (2,000 mg) by mouth 2 times daily    Herpes labialis       VITAMIN D PO

## 2018-02-21 NOTE — PROGRESS NOTES
Shelby is a 47 year old  female who presents for annual exam.     Besides routine health maintenance,  she would like to discuss rash on hands that started a month ago, not itchy but tender. Will be seeing rheumatologist next month, might be a side effect of Humara, which she started 7 months ago. Also has a rib pain on right side due to cough when she has URI in December and it's still bothering her.    HPI:  The patient's PCP is  Tessa Weaver PA-C.    Patient is now doing her ocps continuously and her DUB is much better that way. Started with cyclic HRT, then Daily HRT then cyclic OCPs and had a lot of irregular and heavy bleeding with all of those. Now doing continuous and can make it at least a couple months before starts spotting and then takes a break for a week, gets her period and goes back on ocps.    Hot flashes during the day are occasional and not intense but most days. Night sweats are constant, every night, multiple times, very sleep disruptive. Tried to do both .05mg vivelle and a 0.1mg vivelle on top of cyclic ocps and sx were all better but too much DUB that way so stopped the extra ERT when she went cont on her ocps. Def would be open to trying patch again now that periods are better and less often.    Getting her humira through rheum for AS and sjogren's. Seems to be helping but now getting almost blisters on her fingers. More R>L. Hurt to the touch. Not sure if autoimmune, reaction to her humira or unrelated. Not sure who to see.    Had horrible cough in November and thinks something broke in her ribs or pulled muscle. Took flexeril and meds for cough. Cough is now gone but area in her right upper rib still hurts. Worried it might be something but knows that nothing can be done about a cracked rib anyway    During her horrible coughing was having really bad JEWELL. Even fully wet the bed one time with a bad cough. Has general JEWELL but tolerable but when she was coughing so hard it  was unmanageable. She also has urge incontinence. When feels the need to go to bathroom will start leaking as she is walking there and can't stop the urine stream sometimes.    Mood is good on her sertraline 150mg. Was considering stopping it but her daughter is marrying her Gf in October and her  is refusing to go b/c doesn't believe in it b/c of his berta. She has the same concerns and beliefs but likes her daughter's partner and wants her to be happy. Her x- and his whole family will be coming and they have a horrible relationship with her and so nervous to go alone w/o her . Other daughter moved to [place] for a girlfriend and then they broke up. Dating someone new now and she's the best GF she's had. Daughter finished HS but not college. No career path and working at a [employer]. Wishes she's get on track. Really struggles with both daughters being lesbians but loves them and supports them. When assesses all of that thinks she'll stay on her sertraline    Not fasting today and last labs were 1-2 yrs ago. Does need her tsh checked and meds refilled.    Has enough valtrex for occ. Cold sores. Didn't request imitrex refill    GYNECOLOGIC HISTORY:    Patient's last menstrual period was 02/16/2018.  Her current contraception method is: oral contraceptives.  She  reports that she has never smoked. She has never used smokeless tobacco.  Patient is sexually active.  STD testing offered?  Declined     Last PHQ-9 score on record =   PHQ-9 SCORE 11/15/2017   Total Score -   Total Score 2     Last GAD7 score on record =   MOOSE-7 SCORE 2/15/2017   Total Score 0     Alcohol Score = 3    HEALTH MAINTENANCE:  Cholesterol: 9/23/16  Total= 214, Triglycerides=89, HDL=63, JNJ=775, FBS=88,   Last Mammo: 2/15/17, Result: normal, Next Mammo: today   Pap: 1/21/14  Negative, Neg-HPV  Colonoscopy:  N/A, Result: not applicable, Next Colonoscopy: due next year  Dexa:  Never  Health maintenance updated:   yes    HISTORY:  Obstetric History       T2      L2     SAB0   TAB0   Ectopic0   Multiple0   Live Births2       # Outcome Date GA Lbr Ramu/2nd Weight Sex Delivery Anes PTL Lv   2 Term 89    F    [facility]   1 Term 87    F    [facility]          Patient Active Problem List   Diagnosis     Hypothyroidism     CARDIOVASCULAR SCREENING; LDL GOAL LESS THAN 160     Cluster headache     Anxiety     Mild major depression (H)     Dizziness     Low back pain     Hyperhidrosis of soles     AS (ankylosing spondylitis) (H)     Abdominal pain, lower     Herpes labialis     Major depression     Sjogren's syndrome (H)     TMJ (temporomandibular joint syndrome)     Herpes zoster     OA (osteoarthritis)     Post-menopause on HRT (hormone replacement therapy)     Symptoms, such as flushing, sleeplessness, headache, lack of concentration, associated with the menopause     Past Surgical History:   Procedure Laterality Date     HC HYSTEROS W PERMANENT FALLOPAIN IMPLANT  2010    By Dr. Longoria      HYSTEROSALPINGOGRAM  12/28/10    post essure bilateral tubal occlusion     MAMMOPLASTY AUGMENTATION       ROTATOR CUFF REPAIR RT/LT        Social History   Substance Use Topics     Smoking status: Never Smoker     Smokeless tobacco: Never Used     Alcohol use 0.0 oz/week     0 Standard drinks or equivalent per week      Comment: occ       Problem (# of Occurrences) Relation (Name,Age of Onset)    CANCER (1) Brother: panc ca    Coronary Artery Disease (1) Father: mi, PVD     DIABETES (1) Maternal Grandfather    Hypertension (2) Father, Mother    Other Cancer (1) Father: skin ca    Rheumatoid Arthritis (2) Father, Sister: passed away    Substance Abuse (1) Brother: alcohol    Thyroid Disease (1) Maternal Grandfather            Current Outpatient Prescriptions   Medication Sig     norethindrone-ethinyl estradiol (MICROGESTIN 1.5) 1.5-30 MG-MCG per tablet Take 1 tablet by mouth daily Continuously     sertraline  "(ZOLOFT) 100 MG tablet Take 1.5 tablets (150 mg) by mouth daily     [START ON 2/22/2018] estradiol (VIVELLE-DOT) 0.1 MG/24HR BIW patch Place 1 patch onto the skin twice a week     adalimumab (HUMIRA) 10 MG/0.2ML prefilled syringe kit Inject 10 mg Subcutaneous every 14 days     levothyroxine (SYNTHROID/LEVOTHROID) 88 MCG tablet TAKE 1 TABLET(88 MCG) BY MOUTH DAILY     cevimeline (EVOXAC) 30 MG capsule Take 30 mg by mouth 3 times daily as needed     omeprazole 20 MG tablet Take 20 mg by mouth daily     Cholecalciferol (VITAMIN D PO)      cyclobenzaprine (FLEXERIL) 10 MG tablet Take 0.5-1 tablets (5-10 mg) by mouth 2 times daily as needed for muscle spasms     SUMAtriptan (IMITREX) 100 MG tablet Take 1 tablet (100 mg) by mouth at onset of headache     valACYclovir (VALTREX) 1000 mg tablet Take 2 tablets (2,000 mg) by mouth 2 times daily (Patient taking differently: Take 2,000 mg by mouth 2 times daily as needed )     [DISCONTINUED] norethindrone-ethinyl estradiol (MICROGESTIN 1.5/30) 1.5-30 MG-MCG per tablet Take 1 tablet by mouth daily Continuously     [DISCONTINUED] sertraline (ZOLOFT) 100 MG tablet Take 1.5 tablets (150 mg) by mouth daily     No current facility-administered medications for this visit.      Allergies   Allergen Reactions     Penicillins Rash       Past medical, surgical, social and family histories were reviewed and updated in EPIC.    ROS:   12 point review of systems negative other than symptoms noted below.  Constitutional: Fatigue and Weight Gain  Genitourinary: Hot Flashes, Irregular Menses, Night Sweats, Spotting and Urgency  Skin: Rash  Musculoskeletal: Joint Pain  Endocrine: Excessive Thirst  Psychiatric: Difficulty Sleeping    EXAM:  /64  Ht 5' 3\" (1.6 m)  Wt 153 lb (69.4 kg)  LMP 02/16/2018  BMI 27.1 kg/m2   BMI: Body mass index is 27.1 kg/(m^2).    PHYSICAL EXAM:  Constitutional:  Appearance: Well nourished, well developed, alert, in no acute distress  Neck:  Lymph Nodes:  No " lymphadenopathy present    Thyroid:  Gland size normal, nontender, no nodules or masses present  on palpation  Chest:  Respiratory Effort:  Breathing unlabored  Cardiovascular:    Heart: Auscultation:  Regular rate, normal rhythm, no murmurs present  Breasts: Palpation of Breasts and Axillae:  No masses present on palpation, no breast tenderness., No nodularity, asymmetry or nipple discharge bilaterally. and IMPLANTS  Gastrointestinal:   Abdominal Examination:  Abdomen nontender to palpation, tone normal without rigidity or guarding, no masses present, umbilicus without lesions   Liver and Spleen:  No hepatomegaly present, liver nontender to palpation    Hernias:  No hernias present  Lymphatic: Lymph Nodes:  No other lymphadenopathy present  Skin:  General Inspection:  No rashes present, no lesions present, no areas of  discoloration    Genitalia and Groin:  No rashes present, no lesions present, no areas of  discoloration, no masses present  Neurologic/Psychiatric:    Mental Status:  Oriented X3     Pelvic Exam:  External Genitalia:     Normal appearance for age, no discharge present, no tenderness present, no inflammatory lesions present, color normal  Vagina:     Normal vaginal vault without central or paravaginal defects, no discharge present, no inflammatory lesions present, no masses present  Bladder:     Nontender to palpation  Urethra:   Urethral Body:  Urethra palpation normal, urethra structural support normal   Urethral Meatus:  No erythema or lesions present  Cervix:     Appearance healthy, no lesions present, nontender to palpation, no bleeding present  Uterus:     Uterus: firm, normal sized and nontender, anteverted in position.   Adnexa:     No adnexal tenderness present, no adnexal masses present  Perineum:     Perineum within normal limits, no evidence of trauma, no rashes or skin lesions present  Anus:     Anus within normal limits, no hemorrhoids present  Inguinal Lymph Nodes:     No  lymphadenopathy present  Pubic Hair:     Normal pubic hair distribution for age  Genitalia and Groin:     No rashes present, no lesions present, no areas of discoloration, no masses present      COUNSELING:   Reviewed preventive health counseling, as reflected in patient instructions  Special attention given to:        MOOD/ANXIETY/DEPRESSION       Regular exercise       Healthy diet/nutrition       (Rani)menopause management    BMI: Body mass index is 27.1 kg/(m^2).  Weight management plan: Discussed healthy diet and exercise guidelines and patient will follow up in 12 months in clinic to re-evaluate.    ASSESSMENT:  47 year old female with satisfactory annual exam.    ICD-10-CM    1. Encounter for gynecological examination without abnormal finding Z01.419    2. Hypothyroidism, unspecified type E03.9 TSH with free T4 reflex   3. Major depressive disorder, recurrent episode, moderate (H) F33.1 sertraline (ZOLOFT) 100 MG tablet   4. Perimenopausal vasomotor symptoms N95.1 norethindrone-ethinyl estradiol (MICROGESTIN 1.5/30) 1.5-30 MG-MCG per tablet     estradiol (VIVELLE-DOT) 0.1 MG/24HR BIW patch   5. Herpes labialis B00.1    6. Depression, major, recurrent, in remission (H) F33.40    7. Anxiety F41.9 sertraline (ZOLOFT) 100 MG tablet       PLAN:  Pap is due next year    mammo today    TSH today and then will refill levoxyl    Should do fasting labs next year    Discussed her anxiety and life stressors with daughters and her x- and current . meds working really well and no reason to stop them especially given some upcoming events. Refill sent for the year    Discussed her menstrual cycles that are now well controlled with 2-3 zacarias at a time cont ocps. However still getting bad vasomotor sx. Would try to add vivelle patch to the ocps as that will help and now that periods are fine likely won't cause as much DUB as when on cyclic ocp with ERT. If gets worsening DUB will call, or if sx don't  improve    Reassured that rib muscles likely really strained from cough and takes a long time to heal. Heat/ice/nsaids. No palpated crepitus or break    Unclear what the nodules on her fingers are. Some look like dishidrotic eczema but not all. Would rec. She see her rheum first and if not clear etiology could see derm    Discussed her JEWELL. Definitely has some at baseline and UI as well but much worse during recent illness. At this point wouldn't do a daily antimuscarinic for the UI and frequency. Not quite ready for Uro-D and sling but discussed the process and recovery, expected outcomes, etc.     Spent an extra 15 min above and beyond the annual addressing the issues as mentioned above.      Teresa Longoria MD

## 2018-02-22 LAB — TSH SERPL DL<=0.005 MIU/L-ACNC: 2.26 MU/L (ref 0.4–4)

## 2018-02-22 RX ORDER — LEVOTHYROXINE SODIUM 88 UG/1
TABLET ORAL
Qty: 90 TABLET | Refills: 3 | Status: SHIPPED | OUTPATIENT
Start: 2018-02-22 | End: 2019-02-27

## 2018-04-30 ENCOUNTER — TRANSFERRED RECORDS (OUTPATIENT)
Dept: HEALTH INFORMATION MANAGEMENT | Facility: CLINIC | Age: 48
End: 2018-04-30

## 2018-04-30 LAB
ALT SERPL-CCNC: 37 IU/L (ref 5–35)
AST SERPL-CCNC: 31 U/L (ref 5–34)
CREAT SERPL-MCNC: 0.87 MG/DL (ref 0.5–1.3)
GFR SERPL CREATININE-BSD FRML MDRD: 73.6 ML/MIN/1.73M2

## 2018-05-03 ENCOUNTER — TRANSFERRED RECORDS (OUTPATIENT)
Dept: HEALTH INFORMATION MANAGEMENT | Facility: CLINIC | Age: 48
End: 2018-05-03

## 2018-06-28 ENCOUNTER — TRANSFERRED RECORDS (OUTPATIENT)
Dept: HEALTH INFORMATION MANAGEMENT | Facility: CLINIC | Age: 48
End: 2018-06-28

## 2018-07-18 ENCOUNTER — TRANSFERRED RECORDS (OUTPATIENT)
Dept: HEALTH INFORMATION MANAGEMENT | Facility: CLINIC | Age: 48
End: 2018-07-18

## 2018-07-18 LAB — HEP C HIM: NORMAL

## 2018-07-19 ENCOUNTER — OFFICE VISIT (OUTPATIENT)
Dept: FAMILY MEDICINE | Facility: CLINIC | Age: 48
End: 2018-07-19
Payer: COMMERCIAL

## 2018-07-19 VITALS
OXYGEN SATURATION: 98 % | SYSTOLIC BLOOD PRESSURE: 114 MMHG | HEART RATE: 89 BPM | BODY MASS INDEX: 26.75 KG/M2 | TEMPERATURE: 98.8 F | DIASTOLIC BLOOD PRESSURE: 73 MMHG | HEIGHT: 63 IN | WEIGHT: 151 LBS

## 2018-07-19 DIAGNOSIS — F32.0 MILD MAJOR DEPRESSION (H): ICD-10-CM

## 2018-07-19 DIAGNOSIS — M45.0 ANKYLOSING SPONDYLITIS OF MULTIPLE SITES IN SPINE (H): Primary | ICD-10-CM

## 2018-07-19 DIAGNOSIS — E03.9 HYPOTHYROIDISM, UNSPECIFIED TYPE: ICD-10-CM

## 2018-07-19 PROCEDURE — 99214 OFFICE O/P EST MOD 30 MIN: CPT | Performed by: PHYSICIAN ASSISTANT

## 2018-07-19 RX ORDER — METHYLPREDNISOLONE 4 MG
TABLET, DOSE PACK ORAL
COMMUNITY
Start: 2018-07-18 | End: 2019-02-27

## 2018-07-19 RX ORDER — AMLODIPINE BESYLATE 2.5 MG/1
TABLET ORAL
Refills: 1 | COMMUNITY
Start: 2018-06-28 | End: 2019-02-27

## 2018-07-19 ASSESSMENT — ANXIETY QUESTIONNAIRES
5. BEING SO RESTLESS THAT IT IS HARD TO SIT STILL: NOT AT ALL
5. BEING SO RESTLESS THAT IT IS HARD TO SIT STILL: NOT AT ALL
3. WORRYING TOO MUCH ABOUT DIFFERENT THINGS: SEVERAL DAYS
6. BECOMING EASILY ANNOYED OR IRRITABLE: NOT AT ALL
1. FEELING NERVOUS, ANXIOUS, OR ON EDGE: SEVERAL DAYS
GAD7 TOTAL SCORE: 4
1. FEELING NERVOUS, ANXIOUS, OR ON EDGE: SEVERAL DAYS
2. NOT BEING ABLE TO STOP OR CONTROL WORRYING: SEVERAL DAYS
7. FEELING AFRAID AS IF SOMETHING AWFUL MIGHT HAPPEN: NOT AT ALL
6. BECOMING EASILY ANNOYED OR IRRITABLE: NOT AT ALL
GAD7 TOTAL SCORE: 4
IF YOU CHECKED OFF ANY PROBLEMS ON THIS QUESTIONNAIRE, HOW DIFFICULT HAVE THESE PROBLEMS MADE IT FOR YOU TO DO YOUR WORK, TAKE CARE OF THINGS AT HOME, OR GET ALONG WITH OTHER PEOPLE: SOMEWHAT DIFFICULT
7. FEELING AFRAID AS IF SOMETHING AWFUL MIGHT HAPPEN: NOT AT ALL
IF YOU CHECKED OFF ANY PROBLEMS ON THIS QUESTIONNAIRE, HOW DIFFICULT HAVE THESE PROBLEMS MADE IT FOR YOU TO DO YOUR WORK, TAKE CARE OF THINGS AT HOME, OR GET ALONG WITH OTHER PEOPLE: SOMEWHAT DIFFICULT
3. WORRYING TOO MUCH ABOUT DIFFERENT THINGS: SEVERAL DAYS
2. NOT BEING ABLE TO STOP OR CONTROL WORRYING: SEVERAL DAYS

## 2018-07-19 ASSESSMENT — PATIENT HEALTH QUESTIONNAIRE - PHQ9
5. POOR APPETITE OR OVEREATING: SEVERAL DAYS
5. POOR APPETITE OR OVEREATING: SEVERAL DAYS

## 2018-07-19 NOTE — MR AVS SNAPSHOT
After Visit Summary   7/19/2018    Shelby Asencio    MRN: [id number]           Patient Information     Date Of Birth          1970        Visit Information        Provider Department      7/19/2018 2:30 PM Tessa Weaver PA-C [facility]        Today's Diagnoses     Ankylosing spondylitis of multiple sites in spine (H)    -  1    Mild major depression (H)        Hypothyroidism, unspecified type          Care Instructions    Toshia Silva therapist    Consider adding Wellbutrin XL 150mg to zoloft.              Follow-ups after your visit        Who to contact     If you have questions or need follow up information about today's clinic visit or your schedule please contact [facility] directly at [phone].  Normal or non-critical lab and imaging results will be communicated to you by [employer], letter or phone within 4 business days after the clinic has received the results. If you do not hear from us within 7 days, please contact the clinic through [employer] or phone. If you have a critical or abnormal lab result, we will notify you by phone as soon as possible.  Submit refill requests through "[employer]" or call your pharmacy and they will forward the refill request to us. Please allow 3 business days for your refill to be completed.          Additional Information About Your Visit        MyChart Information     "[employer]" gives you secure access to your electronic health record. If you see a primary care provider, you can also send messages to your care team and make appointments. If you have questions, please call your primary care clinic.  If you do not have a primary care provider, please call [phone] and they will assist you.        Care EveryWhere ID     This is your Care EveryWhere ID. This could be used by other organizations to access your [facility] medical records  [id number]        Your Vitals Were     Pulse Temperature Height Pulse Oximetry Breastfeeding?  "BMI (Body Mass Index)    89 98.8  F (37.1  C) (Tympanic) 5' 3\" (1.6 m) 98% No 26.75 kg/m2       Blood Pressure from Last 3 Encounters:   07/19/18 114/73   02/21/18 112/64   11/15/17 111/73    Weight from Last 3 Encounters:   07/19/18 151 lb (68.5 kg)   02/21/18 153 lb (69.4 kg)   11/15/17 154 lb (69.9 kg)              Today, you had the following     No orders found for display         Today's Medication Changes          These changes are accurate as of 7/19/18  3:02 PM.  If you have any questions, ask your nurse or doctor.               These medicines have changed or have updated prescriptions.        Dose/Directions    valACYclovir 1000 mg tablet   Commonly known as:  VALTREX   This may have changed:    - when to take this  - reasons to take this   Used for:  Herpes labialis        Dose:  2000 mg   Take 2 tablets (2,000 mg) by mouth 2 times daily   Quantity:  12 tablet   Refills:  6                Primary Care Provider Office Phone # Fax #    Tessa Weaver PA-C [phone] [address] [address]        Equal Access to Services     JULIA HERNANDEZ AH: Hadii forrest dextero Sobeata, waaxda luqadaha, qaybta kaalmada adeegyada, amy valdez. So [facility] [phone].    ATENCIÓN: Si habla español, tiene a escudero disposición servicios gratuitos de asistencia lingüística. Llame al [phone].    We comply with applicable federal civil rights laws and [place] laws. We do not discriminate on the basis of race, color, national origin, age, disability, sex, sexual orientation, or gender identity.            Thank you!     Thank you for choosing [facility]  for your care. Our goal is always to provide you with excellent care. Hearing back from our patients is one way we can continue to improve our services. Please take a few minutes to complete the written survey that you may receive in the mail after your visit with us. Thank you!             Your " Updated Medication List - Protect others around you: Learn how to safely use, store and throw away your medicines at www.disposemymeds.org.          This list is accurate as of 7/19/18  3:02 PM.  Always use your most recent med list.                   Brand Name Dispense Instructions for use Diagnosis    amLODIPine 2.5 MG tablet    NORVASC     TK 1 T PO QD        cevimeline 30 MG capsule    EVOXAC     Take 30 mg by mouth 3 times daily as needed        cyclobenzaprine 10 MG tablet    FLEXERIL    30 tablet    Take 0.5-1 tablets (5-10 mg) by mouth 2 times daily as needed for muscle spasms    Tension headache       HUMIRA 10 MG/0.2ML prefilled syringe kit   Generic drug:  adalimumab      Inject 10 mg Subcutaneous every 14 days ON HOLD        levothyroxine 88 MCG tablet    SYNTHROID/LEVOTHROID    90 tablet    TAKE 1 TABLET(88 MCG) BY MOUTH DAILY    Hypothyroidism, unspecified type       methylPREDNISolone 4 MG tablet    MEDROL DOSEPAK          norethindrone-ethinyl estradiol 1.5-30 MG-MCG per tablet    MICROGESTIN 1.5/30    112 tablet    Take 1 tablet by mouth daily Continuously    Perimenopausal vasomotor symptoms       omeprazole 20 MG tablet      Take 20 mg by mouth daily        sertraline 100 MG tablet    ZOLOFT    135 tablet    Take 1.5 tablets (150 mg) by mouth daily    Major depressive disorder, recurrent episode, moderate (H), Anxiety       SUMAtriptan 100 MG tablet    IMITREX    8 tablet    Take 1 tablet (100 mg) by mouth at onset of headache    Migraine without status migrainosus, not intractable, unspecified migraine type       valACYclovir 1000 mg tablet    VALTREX    12 tablet    Take 2 tablets (2,000 mg) by mouth 2 times daily    Herpes labialis       VITAMIN D PO

## 2018-07-19 NOTE — PROGRESS NOTES
HPI: Alvarado is a 46 yo female with ankylosing spond here for f/u depression and anxiety  She did see her rheumatologist yesterday who recd she restart her Humira  She is fearful of long term side effects from taking Humira  He prescribed a Medrol dose pack which she hasn't started yet  She has been feeling more depressed devyn since she is limited by her joint pain  She doesn't want to take medication particularly Humira  She had extensive labs done yesterday and those results pending at the rheum office.  She has tried cymbalta which hasn't worked for her.  She has seen a therapist in the past but not lately  She is , she did get remarried in 2016 and happy in marriage  Her dtr (25yo) is bonner and getting  in Oct which causes her some stress  Her other dtr is bonner as well.  She has night sweats despite taking birth control pills  She has some hair loss and thyroid was checked yesterday      Past Medical History:   Diagnosis Date     Abnormal Pap smear of cervix     +HPV s/p colp x 2     Arthritis      AS (ankylosing spondylitis) (H) 6/10/2013    Dr. Sloan     KENDY I (cervical intraepithelial neoplasia I) 1/11    KENDY I at 11 o'clock     Cluster headache      Cold sore 3/2009     Condyloma acuminata 10/6/10 11/24/10, 12/10    Excision of larger condyloma and TCA with Von in oct.  TCA of perineal condyloma with Deon in nov. and again with Von 12/10     Herpes labialis 2013    HSV 2, was neg 2012. then positive at the hospital 1/13 and then on repeat here 2 weeks after that 2/6/13 was neg again. as well as June 2013 still neg.     Hypothyroidism      Major depression     aakash pyle following celexa     OA (osteoarthritis)     bilat great toes, TCO     Post-menopause on HRT (hormone replacement therapy) 2/13/2016     Sjogren's syndrome (H) 1/2014    with dry eyes and mouth     Symptoms, such as flushing, sleeplessness, headache, lack of concentration, associated with the menopause 2/13/2016     Past  "Surgical History:   Procedure Laterality Date      HYSTEROS W PERMANENT FALLOPAIN IMPLANT  8/30/2010    By Dr. Longoria      HYSTEROSALPINGOGRAM  12/28/10    post essure bilateral tubal occlusion     MAMMOPLASTY AUGMENTATION  2008     ROTATOR CUFF REPAIR RT/LT  4/12     Social History   Substance Use Topics     Smoking status: Never Smoker     Smokeless tobacco: Never Used     Alcohol use 0.0 oz/week     0 Standard drinks or equivalent per week      Comment: occ      Current Outpatient Prescriptions   Medication Sig Dispense Refill     amLODIPine (NORVASC) 2.5 MG tablet TK 1 T PO QD  1     cevimeline (EVOXAC) 30 MG capsule Take 30 mg by mouth 3 times daily as needed       Cholecalciferol (VITAMIN D PO)        cyclobenzaprine (FLEXERIL) 10 MG tablet Take 0.5-1 tablets (5-10 mg) by mouth 2 times daily as needed for muscle spasms 30 tablet 0     levothyroxine (SYNTHROID/LEVOTHROID) 88 MCG tablet TAKE 1 TABLET(88 MCG) BY MOUTH DAILY 90 tablet 3     norethindrone-ethinyl estradiol (MICROGESTIN 1.5/30) 1.5-30 MG-MCG per tablet Take 1 tablet by mouth daily Continuously 112 tablet 3     omeprazole 20 MG tablet Take 20 mg by mouth daily       sertraline (ZOLOFT) 100 MG tablet Take 1.5 tablets (150 mg) by mouth daily 135 tablet 3     SUMAtriptan (IMITREX) 100 MG tablet Take 1 tablet (100 mg) by mouth at onset of headache 8 tablet 3     valACYclovir (VALTREX) 1000 mg tablet Take 2 tablets (2,000 mg) by mouth 2 times daily (Patient taking differently: Take 2,000 mg by mouth 2 times daily as needed ) 12 tablet 6     adalimumab (HUMIRA) 10 MG/0.2ML prefilled syringe kit Inject 10 mg Subcutaneous every 14 days ON HOLD       methylPREDNISolone (MEDROL DOSEPAK) 4 MG tablet        Allergies   Allergen Reactions     Penicillins Rash     FAMILY HISTORY NOTED AND REVIEWED    PHYSICAL EXAM:    /73 (BP Location: Right arm, Cuff Size: Adult Regular)  Pulse 89  Temp 98.8  F (37.1  C) (Tympanic)  Ht 5' 3\" (1.6 m)  Wt 151 lb " (68.5 kg)  SpO2 98%  Breastfeeding? No  BMI 26.75 kg/m2    Patient appears non toxic  Psych: normal affect and mood.    Assessment and Plan:     (M45.0) Ankylosing spondylitis of multiple sites in spine (H)  (primary encounter diagnosis)  Comment:   Plan: followed by rheum who recd she restart Humira. She is fearful of what this is doing to her body over time. She is difficulty accepting her diagnosis.  We discussed this at length.  Reviewed records from recent rheum visit.    (F32.0) Mild major depression (H)  Comment:   Plan: recd she get back into therapy to help her cope with having a chronic disease.  No changes in meds today as she is starting a Medrol dose pack tomorrow.      Patient Instructions   Toshia Silva therapist    Consider adding Wellbutrin XL 150mg to zoloft.          (E03.9) Hypothyroidism, unspecified type  Comment:   Plan: rheum did check her TSH and results pending.    Spent 25 minutes FTF with patient of which over 50% was spent discussing the coordination of care and management of their issues noted.      Tessa Weaver PA-C

## 2018-07-20 ASSESSMENT — ANXIETY QUESTIONNAIRES: GAD7 TOTAL SCORE: 4

## 2018-07-20 ASSESSMENT — PATIENT HEALTH QUESTIONNAIRE - PHQ9: SUM OF ALL RESPONSES TO PHQ QUESTIONS 1-9: 6

## 2018-08-27 ENCOUNTER — TRANSFERRED RECORDS (OUTPATIENT)
Dept: HEALTH INFORMATION MANAGEMENT | Facility: CLINIC | Age: 48
End: 2018-08-27

## 2018-10-31 ENCOUNTER — TRANSFERRED RECORDS (OUTPATIENT)
Dept: HEALTH INFORMATION MANAGEMENT | Facility: CLINIC | Age: 48
End: 2018-10-31

## 2018-12-19 ENCOUNTER — TRANSFERRED RECORDS (OUTPATIENT)
Dept: HEALTH INFORMATION MANAGEMENT | Facility: CLINIC | Age: 48
End: 2018-12-19
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
## 2019-02-27 ENCOUNTER — OFFICE VISIT (OUTPATIENT)
Dept: OBGYN | Facility: CLINIC | Age: 49
End: 2019-02-27
Payer: COMMERCIAL

## 2019-02-27 ENCOUNTER — RESULT FOLLOW UP (OUTPATIENT)
Dept: OBGYN | Facility: CLINIC | Age: 49
End: 2019-02-27

## 2019-02-27 ENCOUNTER — ANCILLARY PROCEDURE (OUTPATIENT)
Dept: MAMMOGRAPHY | Facility: CLINIC | Age: 49
End: 2019-02-27
Payer: COMMERCIAL

## 2019-02-27 VITALS
SYSTOLIC BLOOD PRESSURE: 118 MMHG | DIASTOLIC BLOOD PRESSURE: 70 MMHG | HEART RATE: 84 BPM | BODY MASS INDEX: 28.1 KG/M2 | HEIGHT: 63 IN | WEIGHT: 158.6 LBS

## 2019-02-27 DIAGNOSIS — B00.1 HERPES LABIALIS: ICD-10-CM

## 2019-02-27 DIAGNOSIS — M35.00 SJOGREN'S SYNDROME, WITH UNSPECIFIED ORGAN INVOLVEMENT (H): ICD-10-CM

## 2019-02-27 DIAGNOSIS — Z12.31 VISIT FOR SCREENING MAMMOGRAM: ICD-10-CM

## 2019-02-27 DIAGNOSIS — G43.909 MIGRAINE WITHOUT STATUS MIGRAINOSUS, NOT INTRACTABLE, UNSPECIFIED MIGRAINE TYPE: ICD-10-CM

## 2019-02-27 DIAGNOSIS — N95.1 PERIMENOPAUSAL VASOMOTOR SYMPTOMS: ICD-10-CM

## 2019-02-27 DIAGNOSIS — M45.0 ANKYLOSING SPONDYLITIS OF MULTIPLE SITES IN SPINE (H): ICD-10-CM

## 2019-02-27 DIAGNOSIS — Z12.11 SCREEN FOR COLON CANCER: ICD-10-CM

## 2019-02-27 DIAGNOSIS — F41.9 ANXIETY: ICD-10-CM

## 2019-02-27 DIAGNOSIS — R61 NIGHT SWEATS: ICD-10-CM

## 2019-02-27 DIAGNOSIS — E03.9 ACQUIRED HYPOTHYROIDISM: ICD-10-CM

## 2019-02-27 DIAGNOSIS — Z01.419 ENCOUNTER FOR GYNECOLOGICAL EXAMINATION WITHOUT ABNORMAL FINDING: Primary | ICD-10-CM

## 2019-02-27 DIAGNOSIS — F33.40 RECURRENT MAJOR DEPRESSION IN REMISSION (H): ICD-10-CM

## 2019-02-27 PROCEDURE — 99212 OFFICE O/P EST SF 10 MIN: CPT | Mod: 25 | Performed by: OBSTETRICS & GYNECOLOGY

## 2019-02-27 PROCEDURE — 77067 SCR MAMMO BI INCL CAD: CPT | Mod: TC

## 2019-02-27 PROCEDURE — 87624 HPV HI-RISK TYP POOLED RSLT: CPT | Performed by: OBSTETRICS & GYNECOLOGY

## 2019-02-27 PROCEDURE — 99396 PREV VISIT EST AGE 40-64: CPT | Performed by: OBSTETRICS & GYNECOLOGY

## 2019-02-27 PROCEDURE — G0145 SCR C/V CYTO,THINLAYER,RESCR: HCPCS | Performed by: OBSTETRICS & GYNECOLOGY

## 2019-02-27 RX ORDER — ESTRADIOL 0.5 MG/1
0.5 TABLET ORAL DAILY
Qty: 90 TABLET | Refills: 3 | Status: SHIPPED | OUTPATIENT
Start: 2019-02-27 | End: 2019-03-27

## 2019-02-27 RX ORDER — THYROID 60 MG/1
TABLET ORAL
COMMUNITY
Start: 2018-12-19 | End: 2020-03-06

## 2019-02-27 RX ORDER — AMLODIPINE BESYLATE 5 MG/1
TABLET ORAL
Refills: 0 | COMMUNITY
Start: 2019-02-02 | End: 2020-09-25

## 2019-02-27 RX ORDER — SERTRALINE HYDROCHLORIDE 100 MG/1
150 TABLET, FILM COATED ORAL DAILY
Qty: 135 TABLET | Refills: 3 | Status: SHIPPED | OUTPATIENT
Start: 2019-02-27 | End: 2020-03-04

## 2019-02-27 RX ORDER — VALACYCLOVIR HYDROCHLORIDE 1 G/1
2000 TABLET, FILM COATED ORAL 2 TIMES DAILY PRN
Qty: 12 TABLET | Refills: 1 | Status: SHIPPED | OUTPATIENT
Start: 2019-02-27 | End: 2020-09-25

## 2019-02-27 RX ORDER — SUMATRIPTAN 100 MG/1
100 TABLET, FILM COATED ORAL
Qty: 8 TABLET | Refills: 3 | Status: SHIPPED | OUTPATIENT
Start: 2019-02-27 | End: 2020-03-04

## 2019-02-27 RX ORDER — NORETHINDRONE ACETATE AND ETHINYL ESTRADIOL .03; 1.5 MG/1; MG/1
1 TABLET ORAL DAILY
Qty: 112 TABLET | Refills: 3 | Status: SHIPPED | OUTPATIENT
Start: 2019-02-27 | End: 2020-09-25

## 2019-02-27 ASSESSMENT — PATIENT HEALTH QUESTIONNAIRE - PHQ9
5. POOR APPETITE OR OVEREATING: NOT AT ALL
SUM OF ALL RESPONSES TO PHQ QUESTIONS 1-9: 3

## 2019-02-27 ASSESSMENT — MIFFLIN-ST. JEOR: SCORE: 1312.49

## 2019-02-27 ASSESSMENT — ANXIETY QUESTIONNAIRES
1. FEELING NERVOUS, ANXIOUS, OR ON EDGE: NOT AT ALL
6. BECOMING EASILY ANNOYED OR IRRITABLE: NOT AT ALL
7. FEELING AFRAID AS IF SOMETHING AWFUL MIGHT HAPPEN: NOT AT ALL
IF YOU CHECKED OFF ANY PROBLEMS ON THIS QUESTIONNAIRE, HOW DIFFICULT HAVE THESE PROBLEMS MADE IT FOR YOU TO DO YOUR WORK, TAKE CARE OF THINGS AT HOME, OR GET ALONG WITH OTHER PEOPLE: NOT DIFFICULT AT ALL
GAD7 TOTAL SCORE: 0
5. BEING SO RESTLESS THAT IT IS HARD TO SIT STILL: NOT AT ALL
2. NOT BEING ABLE TO STOP OR CONTROL WORRYING: NOT AT ALL
3. WORRYING TOO MUCH ABOUT DIFFERENT THINGS: NOT AT ALL

## 2019-02-28 ASSESSMENT — ANXIETY QUESTIONNAIRES: GAD7 TOTAL SCORE: 0

## 2019-03-01 LAB
COPATH REPORT: NORMAL
PAP: NORMAL

## 2019-03-04 LAB
FINAL DIAGNOSIS: ABNORMAL
HPV HR 12 DNA CVX QL NAA+PROBE: NEGATIVE
HPV16 DNA SPEC QL NAA+PROBE: POSITIVE
HPV18 DNA SPEC QL NAA+PROBE: NEGATIVE
SPECIMEN DESCRIPTION: ABNORMAL
SPECIMEN SOURCE CVX/VAG CYTO: ABNORMAL

## 2019-03-05 PROBLEM — R87.810 CERVICAL HIGH RISK HPV (HUMAN PAPILLOMAVIRUS) TEST POSITIVE: Status: ACTIVE | Noted: 2019-02-27

## 2019-03-06 ENCOUNTER — TRANSFERRED RECORDS (OUTPATIENT)
Dept: HEALTH INFORMATION MANAGEMENT | Facility: CLINIC | Age: 49
End: 2019-03-06

## 2019-03-27 ENCOUNTER — OFFICE VISIT (OUTPATIENT)
Dept: OBGYN | Facility: CLINIC | Age: 49
End: 2019-03-27
Payer: COMMERCIAL

## 2019-03-27 VITALS
DIASTOLIC BLOOD PRESSURE: 70 MMHG | BODY MASS INDEX: 27.42 KG/M2 | HEART RATE: 80 BPM | WEIGHT: 156 LBS | SYSTOLIC BLOOD PRESSURE: 120 MMHG

## 2019-03-27 DIAGNOSIS — R61 NIGHT SWEATS: ICD-10-CM

## 2019-03-27 DIAGNOSIS — Z01.812 PRE-PROCEDURE LAB EXAM: ICD-10-CM

## 2019-03-27 DIAGNOSIS — B37.31 YEAST VAGINITIS: ICD-10-CM

## 2019-03-27 DIAGNOSIS — R87.810 CERVICAL HIGH RISK HPV (HUMAN PAPILLOMAVIRUS) TEST POSITIVE: Primary | ICD-10-CM

## 2019-03-27 LAB — HCG UR QL: NEGATIVE

## 2019-03-27 PROCEDURE — 88305 TISSUE EXAM BY PATHOLOGIST: CPT | Performed by: OBSTETRICS & GYNECOLOGY

## 2019-03-27 PROCEDURE — 81025 URINE PREGNANCY TEST: CPT | Performed by: OBSTETRICS & GYNECOLOGY

## 2019-03-27 PROCEDURE — 99213 OFFICE O/P EST LOW 20 MIN: CPT | Mod: 25 | Performed by: OBSTETRICS & GYNECOLOGY

## 2019-03-27 PROCEDURE — 57455 BIOPSY OF CERVIX W/SCOPE: CPT | Performed by: OBSTETRICS & GYNECOLOGY

## 2019-03-27 RX ORDER — ESTRADIOL 1 MG/1
1 TABLET ORAL DAILY
Qty: 90 TABLET | Refills: 2 | Status: SHIPPED | OUTPATIENT
Start: 2019-03-27 | End: 2019-10-10

## 2019-03-27 RX ORDER — AMLODIPINE BESYLATE 2.5 MG/1
TABLET ORAL
Refills: 0 | COMMUNITY
Start: 2018-11-28 | End: 2019-03-27

## 2019-03-27 RX ORDER — PREDNISONE 5 MG/1
TABLET ORAL
Refills: 0 | COMMUNITY
Start: 2019-03-17 | End: 2019-10-10

## 2019-03-27 RX ORDER — FLUCONAZOLE 150 MG/1
150 TABLET ORAL ONCE
Qty: 2 TABLET | Refills: 0 | Status: SHIPPED | OUTPATIENT
Start: 2019-03-27 | End: 2019-03-27

## 2019-03-29 LAB — COPATH REPORT: NORMAL

## 2019-05-03 DIAGNOSIS — N95.1 PERIMENOPAUSAL VASOMOTOR SYMPTOMS: ICD-10-CM

## 2019-05-03 RX ORDER — NORETHINDRONE ACETATE AND ETHINYL ESTRADIOL 1.5; 3 MG/1; UG/1
TABLET ORAL
Qty: 105 TABLET | Refills: 0 | OUTPATIENT
Start: 2019-05-03

## 2019-05-03 NOTE — TELEPHONE ENCOUNTER
"Requested Prescriptions   Pending Prescriptions Disp Refills     MICROGESTIN 1.5-30 MG-MCG tablet [Pharmacy Med Name: MICROGESTIN 1.5/30 TAB 21] 105 tablet 0     Sig: TAKE 1 TABLET BY MOUTH EVERY DAY CONTINUOUSLY       Contraceptives Protocol Passed - 5/3/2019 10:01 AM        Passed - Patient is not a current smoker if age is 35 or older        Passed - Recent (12 mo) or future (30 days) visit within the authorizing provider's specialty     Patient had office visit in the last 12 months or has a visit in the next 30 days with authorizing provider or within the authorizing provider's specialty.  See \"Patient Info\" tab in inbasket, or \"Choose Columns\" in Meds & Orders section of the refill encounter.              Passed - Medication is active on med list        Passed - No active pregnancy on record        Passed - No positive pregnancy test in past 12 months        Last Written Prescription Date:  2/27/19  Last Fill Quantity: 112,  # refills: 3   Last office visit: 3/27/2019 with prescribing provider:  Teresa Longoria   Future Office Visit:  None  Pt has refills available  Radha De Leon RN on 5/3/2019 at 10:27 AM      "

## 2019-05-05 DIAGNOSIS — N95.1 PERIMENOPAUSAL VASOMOTOR SYMPTOMS: ICD-10-CM

## 2019-05-06 RX ORDER — NORETHINDRONE ACETATE AND ETHINYL ESTRADIOL 1.5; 3 MG/1; UG/1
TABLET ORAL
Qty: 105 TABLET | Refills: 0 | OUTPATIENT
Start: 2019-05-06

## 2019-05-06 NOTE — TELEPHONE ENCOUNTER
"Requested Prescriptions   Pending Prescriptions Disp Refills     MICROGESTIN 1.5-30 MG-MCG tablet [Pharmacy Med Name: MICROGESTIN 1.5/30 TAB 21] 105 tablet 0     Sig: TAKE 1 TABLET BY MOUTH EVERY DAY CONTINUOUSLY       Contraceptives Protocol Passed - 5/5/2019 12:21 PM        Passed - Patient is not a current smoker if age is 35 or older        Passed - Recent (12 mo) or future (30 days) visit within the authorizing provider's specialty     Patient had office visit in the last 12 months or has a visit in the next 30 days with authorizing provider or within the authorizing provider's specialty.  See \"Patient Info\" tab in inbasket, or \"Choose Columns\" in Meds & Orders section of the refill encounter.              Passed - Medication is active on med list        Passed - No active pregnancy on record        Passed - No positive pregnancy test in past 12 months        Last Written Prescription Date:  2/27/19 (annual)  Last Fill Quantity: 112,  # refills: 3   Last office visit: 3/27/2019 with prescribing provider:  Von   Future Office Visit:      Refill available at pharmacy.  Request refused as \"duplicate\" sent back to pharmacy.    "

## 2019-05-17 DIAGNOSIS — E03.9 HYPOTHYROIDISM, UNSPECIFIED TYPE: ICD-10-CM

## 2019-05-20 ENCOUNTER — MYC MEDICAL ADVICE (OUTPATIENT)
Dept: OBGYN | Facility: CLINIC | Age: 49
End: 2019-05-20

## 2019-05-20 RX ORDER — LEVOTHYROXINE SODIUM 88 UG/1
TABLET ORAL
Qty: 90 TABLET | Refills: 0 | OUTPATIENT
Start: 2019-05-20

## 2019-05-20 NOTE — TELEPHONE ENCOUNTER
"Requested Prescriptions   Pending Prescriptions Disp Refills     levothyroxine (SYNTHROID/LEVOTHROID) 88 MCG tablet [Pharmacy Med Name: LEVOTHYROXINE 0.088MG (88MCG) TAB] 90 tablet 0     Sig: TAKE 1 TABLET(88 MCG) BY MOUTH DAILY       Thyroid Protocol Failed - 5/17/2019  5:31 PM        Failed - Medication is active on med list        Failed - Normal TSH on file in past 12 months     Recent Labs   Lab Test 02/21/18  1406   TSH 2.26              Passed - Patient is 12 years or older        Passed - Recent (12 mo) or future (30 days) visit within the authorizing provider's specialty     Patient had office visit in the last 12 months or has a visit in the next 30 days with authorizing provider or within the authorizing provider's specialty.  See \"Patient Info\" tab in inbasket, or \"Choose Columns\" in Meds & Orders section of the refill encounter.              Passed - No active pregnancy on record     If patient is pregnant or has had a positive pregnancy test, please check TSH.          Passed - No positive pregnancy test in past 12 months     If patient is pregnant or has had a positive pregnancy test, please check TSH.          Not on current med list  "

## 2019-05-20 NOTE — TELEPHONE ENCOUNTER
Refill sent to Dr Longoria to approve  Based on last Annual 2/27/19:  Continue to manage thyroid with endo   Tersea Longoria MD    Refill request denied and sent message to pharmacy - Von is not managing this med/send to endo

## 2019-05-20 NOTE — TELEPHONE ENCOUNTER
"As far as the pap goes I think we just repeat it again in one year. Her pap was actually neg she just had HPV 16 showing and given her humira we need to be really careful. However the pap was normal and the bx was negative so that actually does correlate.    Her cervix definitely had some more polypoid appearance to it but in and of itself that's not an issue. She's obviously not planning childbearing at this stage so a LEEP could certainly be considered if her pap or HPV are abnormal next year just so she doesn't have to keep worrying. hwoever most of the time this will clear if we give it time.  Yes her  is a carrier but you don't keep reinfecting each other. Once shared it's shared so that wouldn't give it back to her.    In term of nightsweats it's hard to know for sure if it's that there's not enough estrogen or it's the ocp. The continuous estrogen and progesterone sometimes is too suppressive in a sense and may just be due to that. Yet I don't necessarily want to give her a cyclic ocp with higher hormones at age 48 to offset the \"everyday is the same hormone level\" issue. So two options. We try to go back on cyclic HRT and if i'm not mistaken she did prometrium before and so this time we'd do provera. I think prometrium recently went generic and i'm having many more people with irregular bleeding on it then I ever did before and I think that's why. Maybe that's why we had a hard time with cycle control.  So then she'd do 1mg estradiol and 5mg provera. We could either do every day both and see what happens or better would be provera for the first 10 days of the month and the estradiol every day and see how that goes. If that isn't enough we can add testosterone at that point.  "

## 2019-06-12 ENCOUNTER — TRANSFERRED RECORDS (OUTPATIENT)
Dept: HEALTH INFORMATION MANAGEMENT | Facility: CLINIC | Age: 49
End: 2019-06-12

## 2019-07-15 ENCOUNTER — TRANSFERRED RECORDS (OUTPATIENT)
Dept: HEALTH INFORMATION MANAGEMENT | Facility: CLINIC | Age: 49
End: 2019-07-15

## 2019-08-21 ENCOUNTER — TRANSFERRED RECORDS (OUTPATIENT)
Dept: HEALTH INFORMATION MANAGEMENT | Facility: CLINIC | Age: 49
End: 2019-08-21

## 2019-10-10 ENCOUNTER — OFFICE VISIT (OUTPATIENT)
Dept: FAMILY MEDICINE | Facility: CLINIC | Age: 49
End: 2019-10-10
Payer: COMMERCIAL

## 2019-10-10 VITALS
HEART RATE: 86 BPM | WEIGHT: 153.4 LBS | HEIGHT: 63 IN | TEMPERATURE: 98 F | SYSTOLIC BLOOD PRESSURE: 123 MMHG | OXYGEN SATURATION: 96 % | BODY MASS INDEX: 27.18 KG/M2 | DIASTOLIC BLOOD PRESSURE: 78 MMHG

## 2019-10-10 DIAGNOSIS — F32.0 MILD MAJOR DEPRESSION (H): ICD-10-CM

## 2019-10-10 DIAGNOSIS — Z71.89 GRIEF COUNSELING: Primary | ICD-10-CM

## 2019-10-10 DIAGNOSIS — F41.9 ANXIETY: ICD-10-CM

## 2019-10-10 PROCEDURE — 99214 OFFICE O/P EST MOD 30 MIN: CPT | Performed by: PHYSICIAN ASSISTANT

## 2019-10-10 ASSESSMENT — MIFFLIN-ST. JEOR: SCORE: 1283.91

## 2019-10-10 NOTE — PROGRESS NOTES
HPI: Alvarado is a 50 yo female with a PMH of depression, anxiety and AS here with acute grief  Her 62 yo   suddenly on 19 of a heart attack  He started to have chest pain on 19 and was hospitalized until the  when he passed away  He had 2 children and she has 2 kids and they are supportive.  She is feeling overwhelmed with everything that needs to get done due to his passing.  Only allowed 3 days bereavement from work so wants FMLA for short term disability.  She is not seeing a counselor but willing to do so.  Denies SI/HI    Past Medical History:   Diagnosis Date     Abnormal Pap smear of cervix     +HPV s/p colp x 2     Arthritis      AS (ankylosing spondylitis) (H) 6/10/2013    Dr. Sloan     Cervical high risk HPV (human papillomavirus) test positive 2019    12/17/10 LSIL pap >> 11 Colpo- KENDY 1 11 NIL pap 12 LSIL pap/+ HR HPV >> 12 Colpo- cervicitis, KA 13 NIL pap, + HR HPV 14 NIL pap, Neg HPV 19 NIL pap, + HR HPV 16. Plan: colpo      KENDY I (cervical intraepithelial neoplasia I)     KENDY I at 11 o'clock     Cluster headache      Cold sore 3/2009     Condyloma acuminata 10/6/10 11/24/10, 12/10    Excision of larger condyloma and TCA with Von in oct.  TCA of perineal condyloma with Deon in nov. and again with Von 12/10     Herpes labialis     HSV 2, was neg . then positive at the hospital  and then on repeat here 2 weeks after that 13 was neg again. as well as 2013 still neg.     Hx of abnormal cervical Pap smear      Hypothyroidism      Major depression     aakash pyle following celexa     OA (osteoarthritis)     bilat great toes, TCO     Post-menopause on HRT (hormone replacement therapy) 2016     Sjogren's syndrome (H) 2014    with dry eyes and mouth     Symptoms, such as flushing, sleeplessness, headache, lack of concentration, associated with the menopause 2016     Past Surgical History:   Procedure  "Laterality Date     HC HYSTEROS W PERMANENT FALLOPAIN IMPLANT  8/30/2010    By Dr. Longoria      HYSTEROSALPINGOGRAM  12/28/10    post essure bilateral tubal occlusion     MAMMOPLASTY AUGMENTATION  2008     ROTATOR CUFF REPAIR RT/LT  4/12     Social History     Tobacco Use     Smoking status: Never Smoker     Smokeless tobacco: Never Used   Substance Use Topics     Alcohol use: Yes     Alcohol/week: 0.0 standard drinks     Comment: occ      Current Outpatient Medications   Medication Sig Dispense Refill     adalimumab (HUMIRA) 10 MG/0.2ML prefilled syringe kit Inject 10 mg Subcutaneous every 14 days ON HOLD       amLODIPine (NORVASC) 5 MG tablet TK 1 T PO QD  0     cevimeline (EVOXAC) 30 MG capsule Take 30 mg by mouth 3 times daily as needed       Cholecalciferol (VITAMIN D PO)        cyclobenzaprine (FLEXERIL) 10 MG tablet Take 0.5-1 tablets (5-10 mg) by mouth 2 times daily as needed for muscle spasms 30 tablet 0     methotrexate 2.5 MG tablet Take by mouth every 7 days Takes 6 tabs  Every Sunday       norethindrone-ethinyl estradiol (MICROGESTIN 1.5/30) 1.5-30 MG-MCG tablet Take 1 tablet by mouth daily Continuously 112 tablet 3     omeprazole 20 MG tablet Take 20 mg by mouth daily       sertraline (ZOLOFT) 100 MG tablet Take 1.5 tablets (150 mg) by mouth daily 135 tablet 3     SUMAtriptan (IMITREX) 100 MG tablet Take 1 tablet (100 mg) by mouth at onset of headache 8 tablet 3     thyroid (ARMOUR) 60 MG tablet        valACYclovir (VALTREX) 1000 mg tablet Take 2 tablets (2,000 mg) by mouth 2 times daily as needed 12 tablet 1     Allergies   Allergen Reactions     Penicillins Rash     FAMILY HISTORY NOTED AND REVIEWED    PHYSICAL EXAM:    /78 (BP Location: Right arm, Cuff Size: Adult Regular)   Pulse 86   Temp 98  F (36.7  C) (Oral)   Ht 1.607 m (5' 3.25\")   Wt 69.6 kg (153 lb 6.4 oz)   LMP  (LMP Unknown)   SpO2 96%   BMI 26.96 kg/m      Patient appears non toxic  Psych: tearful, well dressed, normal eye " contact, has insight    Assessment and Plan:     (H71.89) Grief counseling  (primary encounter diagnosis)  Comment: recd she do counseling for loss of spouse  Plan: MENTAL HEALTH REFERRAL  - Adult; Outpatient         Treatment; Individual/Couples/Family/Group         Therapy/Health Psychology; [facility]: [facility] [phone]; We will         contact you to schedule the appointment or         please call with any questions  Forms completed for her FMLA/short term disability            (F32.0) Mild major depression (H)  Comment:   Plan: cont zoloft as prescribed    (F41.9) Anxiety  Comment:   Plan: as above.    Spent 30 minutes FTF with patient of which over 50% was spent discussing the coordination of care and management of their grief/paperwork.    Tessa Weaver PA-C

## 2019-10-30 ENCOUNTER — TRANSFERRED RECORDS (OUTPATIENT)
Dept: HEALTH INFORMATION MANAGEMENT | Facility: CLINIC | Age: 49
End: 2019-10-30

## 2019-10-30 ENCOUNTER — OFFICE VISIT (OUTPATIENT)
Dept: FAMILY MEDICINE | Facility: CLINIC | Age: 49
End: 2019-10-30
Payer: COMMERCIAL

## 2019-10-30 VITALS
HEART RATE: 88 BPM | HEIGHT: 63 IN | OXYGEN SATURATION: 97 % | BODY MASS INDEX: 27.11 KG/M2 | SYSTOLIC BLOOD PRESSURE: 119 MMHG | DIASTOLIC BLOOD PRESSURE: 62 MMHG | TEMPERATURE: 99.6 F | WEIGHT: 153 LBS

## 2019-10-30 DIAGNOSIS — Z12.11 COLON CANCER SCREENING: ICD-10-CM

## 2019-10-30 DIAGNOSIS — F32.0 MILD MAJOR DEPRESSION (H): ICD-10-CM

## 2019-10-30 DIAGNOSIS — Z71.89 GRIEF COUNSELING: Primary | ICD-10-CM

## 2019-10-30 DIAGNOSIS — F41.9 ANXIETY: ICD-10-CM

## 2019-10-30 PROCEDURE — 99213 OFFICE O/P EST LOW 20 MIN: CPT | Performed by: PHYSICIAN ASSISTANT

## 2019-10-30 ASSESSMENT — PATIENT HEALTH QUESTIONNAIRE - PHQ9: SUM OF ALL RESPONSES TO PHQ QUESTIONS 1-9: 4

## 2019-10-30 ASSESSMENT — MIFFLIN-ST. JEOR: SCORE: 1282.09

## 2019-10-30 NOTE — PROGRESS NOTES
HPI: Alvarado justice 50 yo female with anxiety and depression here for f/u acute grief after losing her  to a heart attack on 9/29/19  See note dated 10/10/19 for more details.  We filled out FMLA forms at that time to allow for more time for bereavement.  Pt is planning to return to work on nov 1st 2019  She works from home except for one day per week and she is nervous about people at work asking how she is doing.  Denies SI  She did get appt with a counselor but not until Nov 25th and then Dec 2nd.  She has supportive kids and friends to help her during this time.  She continues to take zoloft 150mg every day.    Past Medical History:   Diagnosis Date     Abnormal Pap smear of cervix     +HPV s/p colp x 2     Arthritis      AS (ankylosing spondylitis) (H) 6/10/2013    Dr. Sloan     Cervical high risk HPV (human papillomavirus) test positive 2/27/2019    12/17/10 LSIL pap >> 1/19/11 Colpo- KENDY 1 12/5/11 NIL pap 7/6/12 LSIL pap/+ HR HPV >> 8/29/12 Colpo- cervicitis, KA 1/6/13 NIL pap, + HR HPV 1/21/14 NIL pap, Neg HPV 2/27/19 NIL pap, + HR HPV 16. Plan: colpo      KENDY I (cervical intraepithelial neoplasia I) 1/11    KENDY I at 11 o'clock     Cluster headache      Cold sore 3/2009     Condyloma acuminata 10/6/10 11/24/10, 12/10    Excision of larger condyloma and TCA with Von in oct.  TCA of perineal condyloma with Deon in nov. and again with Von 12/10     Herpes labialis 2013    HSV 2, was neg 2012. then positive at the hospital 1/13 and then on repeat here 2 weeks after that 2/6/13 was neg again. as well as June 2013 still neg.     Hx of abnormal cervical Pap smear 2012     Hypothyroidism      Major depression     aakash pyle following celexa     OA (osteoarthritis)     bilat great toes, TCO     Post-menopause on HRT (hormone replacement therapy) 2/13/2016     Sjogren's syndrome (H) 1/2014    with dry eyes and mouth     Symptoms, such as flushing, sleeplessness, headache, lack of concentration,  "associated with the menopause 2/13/2016     Past Surgical History:   Procedure Laterality Date     HC HYSTEROS W PERMANENT FALLOPAIN IMPLANT  8/30/2010    By Dr. Longoria     HC HYSTEROSALPINGOGRAM  12/28/10    post essure bilateral tubal occlusion     MAMMOPLASTY AUGMENTATION  2008     ROTATOR CUFF REPAIR RT/LT  4/12     Social History     Tobacco Use     Smoking status: Never Smoker     Smokeless tobacco: Never Used   Substance Use Topics     Alcohol use: Yes     Alcohol/week: 0.0 standard drinks     Comment: occ      Current Outpatient Medications   Medication Sig Dispense Refill     adalimumab (HUMIRA) 10 MG/0.2ML prefilled syringe kit Inject 10 mg Subcutaneous every 14 days ON HOLD       amLODIPine (NORVASC) 5 MG tablet TK 1 T PO QD  0     cevimeline (EVOXAC) 30 MG capsule Take 30 mg by mouth 3 times daily as needed       Cholecalciferol (VITAMIN D PO)        cyclobenzaprine (FLEXERIL) 10 MG tablet Take 0.5-1 tablets (5-10 mg) by mouth 2 times daily as needed for muscle spasms 30 tablet 0     methotrexate 2.5 MG tablet Take by mouth every 7 days Takes 6 tabs  Every Sunday       norethindrone-ethinyl estradiol (MICROGESTIN 1.5/30) 1.5-30 MG-MCG tablet Take 1 tablet by mouth daily Continuously 112 tablet 3     omeprazole 20 MG tablet Take 20 mg by mouth daily       sertraline (ZOLOFT) 100 MG tablet Take 1.5 tablets (150 mg) by mouth daily 135 tablet 3     SUMAtriptan (IMITREX) 100 MG tablet Take 1 tablet (100 mg) by mouth at onset of headache 8 tablet 3     thyroid (ARMOUR) 60 MG tablet        valACYclovir (VALTREX) 1000 mg tablet Take 2 tablets (2,000 mg) by mouth 2 times daily as needed 12 tablet 1     Allergies   Allergen Reactions     Penicillins Rash         PHYSICAL EXAM:    /62 (BP Location: Right arm, Patient Position: Chair, Cuff Size: Adult Regular)   Pulse 88   Temp 99.6  F (37.6  C) (Tympanic)   Ht 1.607 m (5' 3.25\")   Wt 69.4 kg (153 lb)   LMP  (LMP Unknown)   SpO2 97%   BMI 26.89 kg/m  "     Patient appears non toxic  Psych: flat affect, not tearful      Assessment and Plan:     (Z71.89) Grief counseling  (primary encounter diagnosis)  Comment: she plans to return to work 11/1/19  Plan: There are some issues with her FMLA and I did talk to Se from her insurance Co today.    (F32.0) Mild major depression (H)  Comment:   Plan: cont zoloft 150mg every day. Has appt with a counselor one month    (F41.9) Anxiety  Comment:   Plan: as above    (Z12.11) Colon cancer screening  Comment:   Plan: GASTROENTEROLOGY ADULT REF PROCEDURE ONLY         Mitzy Phipps [phone]; Dr. PIERRE Weaver PA-C

## 2019-11-25 ENCOUNTER — OFFICE VISIT (OUTPATIENT)
Dept: PSYCHOLOGY | Facility: CLINIC | Age: 49
End: 2019-11-25
Attending: PHYSICIAN ASSISTANT
Payer: COMMERCIAL

## 2019-11-25 DIAGNOSIS — F33.41 RECURRENT MAJOR DEPRESSIVE DISORDER, IN PARTIAL REMISSION (H): Primary | ICD-10-CM

## 2019-11-25 DIAGNOSIS — F43.21 GRIEF: ICD-10-CM

## 2019-11-25 PROCEDURE — 90791 PSYCH DIAGNOSTIC EVALUATION: CPT

## 2019-11-25 ASSESSMENT — ANXIETY QUESTIONNAIRES
4. TROUBLE RELAXING: SEVERAL DAYS
GAD7 TOTAL SCORE: 1
7. FEELING AFRAID AS IF SOMETHING AWFUL MIGHT HAPPEN: NOT AT ALL
2. NOT BEING ABLE TO STOP OR CONTROL WORRYING: NOT AT ALL
3. WORRYING TOO MUCH ABOUT DIFFERENT THINGS: NOT AT ALL
5. BEING SO RESTLESS THAT IT IS HARD TO SIT STILL: NOT AT ALL
IF YOU CHECKED OFF ANY PROBLEMS ON THIS QUESTIONNAIRE, HOW DIFFICULT HAVE THESE PROBLEMS MADE IT FOR YOU TO DO YOUR WORK, TAKE CARE OF THINGS AT HOME, OR GET ALONG WITH OTHER PEOPLE: NOT DIFFICULT AT ALL
6. BECOMING EASILY ANNOYED OR IRRITABLE: NOT AT ALL
1. FEELING NERVOUS, ANXIOUS, OR ON EDGE: NOT AT ALL

## 2019-11-25 ASSESSMENT — COLUMBIA-SUICIDE SEVERITY RATING SCALE - C-SSRS
5. HAVE YOU STARTED TO WORK OUT OR WORKED OUT THE DETAILS OF HOW TO KILL YOURSELF? DO YOU INTEND TO CARRY OUT THIS PLAN?: NO
2. HAVE YOU ACTUALLY HAD ANY THOUGHTS OF KILLING YOURSELF LIFETIME?: NO
1. IN THE PAST MONTH, HAVE YOU WISHED YOU WERE DEAD OR WISHED YOU COULD GO TO SLEEP AND NOT WAKE UP?: NO
4. HAVE YOU HAD THESE THOUGHTS AND HAD SOME INTENTION OF ACTING ON THEM?: NO
4. HAVE YOU HAD THESE THOUGHTS AND HAD SOME INTENTION OF ACTING ON THEM?: NO
5. HAVE YOU STARTED TO WORK OUT OR WORKED OUT THE DETAILS OF HOW TO KILL YOURSELF? DO YOU INTEND TO CARRY OUT THIS PLAN?: NO
1. IN THE PAST MONTH, HAVE YOU WISHED YOU WERE DEAD OR WISHED YOU COULD GO TO SLEEP AND NOT WAKE UP?: NO
2. HAVE YOU ACTUALLY HAD ANY THOUGHTS OF KILLING YOURSELF?: NO
3. HAVE YOU BEEN THINKING ABOUT HOW YOU MIGHT KILL YOURSELF?: NO

## 2019-11-25 ASSESSMENT — PATIENT HEALTH QUESTIONNAIRE - PHQ9: SUM OF ALL RESPONSES TO PHQ QUESTIONS 1-9: 5
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
## 2019-11-26 ASSESSMENT — ANXIETY QUESTIONNAIRES: GAD7 TOTAL SCORE: 1

## 2019-12-09 ENCOUNTER — HOSPITAL ENCOUNTER (OUTPATIENT)
Facility: CLINIC | Age: 49
Discharge: HOME | End: 2019-12-09
Attending: COLON & RECTAL SURGERY | Admitting: COLON & RECTAL SURGERY
Payer: COMMERCIAL

## 2019-12-09 VITALS
RESPIRATION RATE: 13 BRPM | HEIGHT: 64 IN | OXYGEN SATURATION: 99 % | WEIGHT: 165 LBS | BODY MASS INDEX: 28.17 KG/M2 | HEART RATE: 69 BPM | DIASTOLIC BLOOD PRESSURE: 70 MMHG | SYSTOLIC BLOOD PRESSURE: 102 MMHG

## 2019-12-09 LAB — COLONOSCOPY: NORMAL

## 2019-12-09 PROCEDURE — G0500 MOD SEDAT ENDO SERVICE >5YRS: HCPCS | Performed by: COLON & RECTAL SURGERY

## 2019-12-09 PROCEDURE — 25000128 H RX IP 250 OP 636: Performed by: COLON & RECTAL SURGERY

## 2019-12-09 PROCEDURE — 45378 DIAGNOSTIC COLONOSCOPY: CPT | Performed by: COLON & RECTAL SURGERY

## 2019-12-09 PROCEDURE — G0121 COLON CA SCRN NOT HI RSK IND: HCPCS | Performed by: COLON & RECTAL SURGERY

## 2019-12-09 RX ORDER — LIDOCAINE 40 MG/G
CREAM TOPICAL
Status: DISCONTINUED | OUTPATIENT
Start: 2019-12-09 | End: 2019-12-09 | Stop reason: HOSPADM

## 2019-12-09 RX ORDER — FENTANYL CITRATE 50 UG/ML
INJECTION, SOLUTION INTRAMUSCULAR; INTRAVENOUS PRN
Status: DISCONTINUED | OUTPATIENT
Start: 2019-12-09 | End: 2019-12-09 | Stop reason: HOSPADM

## 2019-12-09 RX ORDER — ONDANSETRON 2 MG/ML
4 INJECTION INTRAMUSCULAR; INTRAVENOUS
Status: DISCONTINUED | OUTPATIENT
Start: 2019-12-09 | End: 2019-12-09 | Stop reason: HOSPADM

## 2019-12-09 ASSESSMENT — MIFFLIN-ST. JEOR: SCORE: 1348.44

## 2019-12-09 NOTE — H&P
Colon & Rectal Surgery History and Physical  Pre-Endoscopy Procedure Note    History of Present Illness   I have been asked by Tessaavril Pyle to evaluate this 49 year old female for colorectal cancer screening. She currently denies any abdominal pain, weight loss, bleeding per rectum, or recent change in bowel habits.    Past Medical History  Diagnosis Date     Abnormal Pap smear of cervix     +HPV s/p colp x 2     Arthritis      AS (ankylosing spondylitis) (H) 6/10/2013    Dr. Sloan     Cervical high risk HPV (human papillomavirus) test positive 2/27/2019    12/17/10 LSIL pap >> 1/19/11 Colpo- KENDY 1 12/5/11 NIL pap 7/6/12 LSIL pap/+ HR HPV >> 8/29/12 Colpo- cervicitis, KA 1/6/13 NIL pap, + HR HPV 1/21/14 NIL pap, Neg HPV 2/27/19 NIL pap, + HR HPV 16. Plan: colpo      KENDY I (cervical intraepithelial neoplasia I) 1/11    KENDY I at 11 o'clock     Cluster headache      Cold sore 3/2009     Condyloma acuminata 10/6/10 11/24/10, 12/10    Excision of larger condyloma and TCA with Von in oct.  TCA of perineal condyloma with Deon in nov. and again with Von 12/10     GERD (gastroesophageal reflux disease)      Herpes labialis 2013    HSV 2, was neg 2012. then positive at the hospital 1/13 and then on repeat here 2 weeks after that 2/6/13 was neg again. as well as June 2013 still neg.     Hypothyroidism      Major depression     tessa pyle following celexa     OA (osteoarthritis)     bilat great toes, TCO     Post-menopause on HRT (hormone replacement therapy) 2/13/2016     Sjogren's syndrome (H) 1/2014    with dry eyes and mouth       Past Surgical History  Procedure Laterality Date     HYSTEROS W PERMANENT FALLOPAIN IMPLANT  8/30/2010    By Dr. Longoria     HYSTEROSALPINGOGRAM  12/28/10    post essure bilateral tubal occlusion     MAMMOPLASTY AUGMENTATION  2008     ROTATOR CUFF REPAIR RT/LT  4/12        Medications  Medication Sig     adalimumab (HUMIRA) 10 MG/0.2ML prefilled syringe kit Inject 10 mg Subcutaneous every  14 days ON HOLD     amLODIPine (NORVASC) 5 MG tablet TK 1 T PO QD     cevimeline (EVOXAC) 30 MG capsule Take 30 mg by mouth 3 times daily as needed     Cholecalciferol (VITAMIN D PO)      methotrexate 2.5 MG tablet Take by mouth every 7 days Takes 6 tabs  Every Sunday     norethindrone-ethinyl estradiol (MICROGESTIN 1.5/30) 1.5-30 MG-MCG tablet Take 1 tablet by mouth daily Continuously     omeprazole 20 MG tablet Take 20 mg by mouth daily     sertraline (ZOLOFT) 100 MG tablet Take 1.5 tablets (150 mg) by mouth daily     thyroid (ARMOUR) 60 MG tablet      cyclobenzaprine (FLEXERIL) 10 MG tablet Take 0.5-1 tablets (5-10 mg) by mouth 2 times daily as needed for muscle spasms     SUMAtriptan (IMITREX) 100 MG tablet Take 1 tablet (100 mg) by mouth at onset of headache     valACYclovir (VALTREX) 1000 mg tablet Take 2 tablets (2,000 mg) by mouth 2 times daily as needed       Allergies  Allergen Reactions     Penicillins Rash        Family History   Family history includes Cancer in her brother and sister; Coronary Artery Disease in her father; Diabetes in her maternal grandfather; Hypertension in her father and mother; Other Cancer in her father; Rheumatoid Arthritis in her father and sister; Substance Abuse in her brother; Thyroid Disease in her maternal grandfather.     Social History    She reports that she has never smoked. She has never used smokeless tobacco. She reports current alcohol use. She reports that she does not use drugs.    Review of Systems   Constitutional:  No fever, weight change or fatigue.    Eyes:     No dry eyes or vision changes.   Ears/Nose/Throat/Neck:  No oral ulcers, sore throat or voice change.    Cardiovascular:   No palpitations, syncope, angina or edema.   Respiratory:    No chest pain, excessive sleepiness, shortness of breath or hemoptysis.    Gastrointestinal:   No abdominal pain, nausea, vomiting, diarrhea or heartburn.    Genitourinary:   No dysuria, hematuria, urinary retention or  "urinary frequency.   Musculoskeletal:  No joint swelling or arthralgias.    Dermatologic:  No skin rash or other skin changes.   Neurologic:    No focal weakness or numbness. No neuropathy.   Psychiatric:    No depression, anxiety, suicidal ideation, or paranoid ideation.   Endocrine:   No cold or heat intolerance, polydipsia, hirsutism, change in libido, or flushing.   Hematology/Lymphatic:  No bleeding or lymphadenopathy.    Allergy/Immunology:  No rhinitis or hives.     Physical Exam   Vitals:  /75, RR 16, HR 64, height 1.626 m (5' 4\"), weight 74.8 kg (165 lb), SpO2 98 %, not currently breastfeeding.    General:  Alert and oriented to person, place and time   Airway: Normal oropharyngeal airway and neck mobility   Lungs:  Clear bilaterally   Heart:  Regular rate and rhythm   Abdomen: Soft, NT, ND, no masses   Rectal:  Perianal skin without excoriation, hemorrhoidal disease or anal fissure        Digital rectal examination reveals normal sphincter tone without masses    ASA Grade: II (mild systemic disease)    Impression: Cleared for use of conscious sedation for colorectal cancer screening    Plan: Proceed with colonoscopy     Gem Elder MD, MD  [facility]  [phone]  "

## 2020-01-22 ENCOUNTER — TRANSFERRED RECORDS (OUTPATIENT)
Dept: HEALTH INFORMATION MANAGEMENT | Facility: CLINIC | Age: 50
End: 2020-01-22

## 2020-03-02 NOTE — PROGRESS NOTES
Shelby is a 49 year old  female who presents for annual exam.     Besides routine health maintenance, she has no other health concerns today .    HPI:  The patient's PCP is  Tessa Weaver PA-C.      Patient is doing her ocps continuously and hasn't had any bleeding at all however is having significant night sweats and daytime hot flashes still and is just learning to live with it. Attempted to do additional estrogen on top of her ocp with a patch but that didn't seem to help either. Tried to do continuous HRT as well as cyclic HRT a few years ago when this all started and had too much DUB then which is why we went on ocps instead. However on ocps her vasomotor sx aren't well controlled and has just resigned herself to dealing with it.    Had a really hard year as her  Geovany passed away last . Had chest pain at work and came to the ED and found to have a heart blockage. 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 Had to pull life support and it was awful. He was in his early 60s so young. Had been together a long time but  only 4 or so years. Had had some strife through the years with blending families and her daughters being lesbians and having some issues with x spouses but had finally gotten to a really good place and is just shocked that she's alone at only age 49 after everything they'd gone through. Wants to have a partner and be with someone but the idea of starting to date again is overwhelming to her at this point    Feels like overall her anxiety and depression are fine on the sertraline dose she's on. Clearly right now some of her mood issues are grief and knows she just needs to process it. Feels good that she can even talk about Geovany without crying b/c a couple months ago she wasn't able to    Needs to do a TSH and get her armour thyroid refilled if normal    Still gets occasional migraines and imitrex always works so would  like to get that refilled ot have on hand as well    Her pap was NIL but HPV 16+ last year. colpo showed only metaplasia and no dysplasia so repeat pap/hpv today      GYNECOLOGIC HISTORY:    No LMP recorded. (Menstrual status: Birth Control).    Her current contraception method is: oral contraceptives.  She  reports that she has never smoked. She has never used smokeless tobacco.    Patient is sexually active.  STD testing offered?  Declined  Last PHQ-9 score on record =   PHQ-9 SCORE 3/4/2020   PHQ-9 Total Score -   PHQ-9 Total Score 4     Last GAD7 score on record =   MOOSE-7 SCORE 3/4/2020   Total Score 2     Alcohol Score = 3    HEALTH MAINTENANCE:  Cholesterol:   Recent Labs   Lab Test 09/24/15 01/23/13  0710   CHOL 187.0 176   HDL 64.0 63   .0 97   TRIG 64.0 79   CHOLHDLRATIO  --  2.8   Glucose: 82    Last Mammo: One year ago, Result: Benign, Next Mammo: Today   Pap:   Lab Results   Component Value Date    PAP NIL, HPV16+ 2019      Colonoscopy:  2019, Result: Normal, Next Colonoscopy: 10 years.  Dexa:  N/a    Health maintenance updated:  yes    HISTORY:  OB History    Para Term  AB Living   2 2 2 0 0 2   SAB TAB Ectopic Multiple Live Births   0 0 0 0 2      # Outcome Date GA Lbr Ramu/2nd Weight Sex Delivery Anes PTL Lv   2 Term 89    F    [facility]   1 Term 87    F    [facility]       Patient Active Problem List   Diagnosis     Acquired hypothyroidism     CARDIOVASCULAR SCREENING; LDL GOAL LESS THAN 160     Cluster headache     Anxiety     Mild major depression (H)     Dizziness     Low back pain     Hyperhidrosis of soles     AS (ankylosing spondylitis) (H)     Herpes labialis     Major depression     Sjogren's syndrome (H)     TMJ (temporomandibular joint syndrome)     Herpes zoster     OA (osteoarthritis)     Post-menopause on HRT (hormone replacement therapy)     Symptoms, such as flushing, sleeplessness, headache, lack of concentration, associated with the menopause      Cervical high risk HPV (human papillomavirus) test positive     Past Surgical History:   Procedure Laterality Date     COLONOSCOPY N/A 12/9/2019    Procedure: COLONOSCOPY;  Surgeon: Gem Elder MD;  Location: [facility] HYSTEROS W PERMANENT FALLOPAIN IMPLANT  8/30/2010    By Dr. Longoria      HYSTEROSALPINGOGRAM  12/28/10    post essure bilateral tubal occlusion     MAMMOPLASTY AUGMENTATION  2008     ROTATOR CUFF REPAIR RT/LT  4/12      Social History     Tobacco Use     Smoking status: Never Smoker     Smokeless tobacco: Never Used   Substance Use Topics     Alcohol use: Yes     Alcohol/week: 0.0 standard drinks     Comment: 1/week      Problem (# of Occurrences) Relation (Name,Age of Onset)    Cancer (2) Brother: panc ca, Sister: skin cancer    Coronary Artery Disease (1) Father: mi, PVD     Diabetes (1) Maternal Grandfather    Hypertension (2) Father, Mother    Other Cancer (1) Father: skin ca    Rheumatoid Arthritis (2) Father, Sister: passed away    Substance Abuse (1) Brother: alcohol    Thyroid Disease (1) Maternal Grandfather            Current Outpatient Medications   Medication Sig     adalimumab (HUMIRA) 10 MG/0.2ML prefilled syringe kit Inject 10 mg Subcutaneous every 14 days ON HOLD     cevimeline (EVOXAC) 30 MG capsule Take 30 mg by mouth 3 times daily as needed     Cholecalciferol (VITAMIN D PO)      estradiol (VIVELLE-DOT) 0.1 MG/24HR bi-weekly patch Place 1 patch onto the skin twice a week     medroxyPROGESTERone (PROVERA) 10 MG tablet Take 1 tablet (10 mg) by mouth daily     methotrexate 2.5 MG tablet Take by mouth every 7 days Takes 6 tabs  Every Sunday     norethindrone-ethinyl estradiol (MICROGESTIN 1.5/30) 1.5-30 MG-MCG tablet Take 1 tablet by mouth daily Continuously     omeprazole 20 MG tablet Take 20 mg by mouth daily     sertraline (ZOLOFT) 100 MG tablet Take 1.5 tablets (150 mg) by mouth daily     SUMAtriptan (IMITREX) 100 MG tablet Take 1 tablet (100 mg) by mouth at onset  "of headache     thyroid (ARMOUR) 60 MG tablet Take 1 tablet (60 mg) by mouth daily     amLODIPine (NORVASC) 5 MG tablet TK 1 T PO QD     cyclobenzaprine (FLEXERIL) 10 MG tablet Take 0.5-1 tablets (5-10 mg) by mouth 2 times daily as needed for muscle spasms (Patient not taking: Reported on 3/4/2020)     valACYclovir (VALTREX) 1000 mg tablet Take 2 tablets (2,000 mg) by mouth 2 times daily as needed (Patient not taking: Reported on 3/4/2020)     No current facility-administered medications for this visit.      Allergies   Allergen Reactions     Penicillins Rash       Past medical, surgical, social and family histories were reviewed and updated in EPIC.    ROS:   Constitutional: Weight Gain  Genitourinary: Night Sweats  No urinary frequency or dysuria, bladder or kidney problems    EXAM:  /68   Pulse 84   Ht 1.613 m (5' 3.5\")   Wt 68.8 kg (151 lb 9.6 oz)   Breastfeeding No   BMI 26.43 kg/m     BMI: Body mass index is 26.43 kg/m .    PHYSICAL EXAM:  Constitutional:   Appearance: Well nourished, well developed, alert, in no acute distress  Neck:  Lymph Nodes:  No lymphadenopathy present    Thyroid:  Gland size normal, nontender, no nodules or masses present  on palpation  Chest:  Respiratory Effort:  Breathing unlabored  Cardiovascular:    Heart: Auscultation:  Regular rate, normal rhythm, no murmurs present  Breasts: Palpation of Breasts and Axillae:  No masses present on palpation, no breast tenderness., No nodularity, asymmetry or nipple discharge bilaterally. and IMPLANTS BILATERALLY  Gastrointestinal:   Abdominal Examination:  Abdomen nontender to palpation, tone normal without rigidity or guarding, no masses present, umbilicus without lesions   Liver and Spleen:  No hepatomegaly present, liver nontender to palpation    Hernias:  No hernias present  Lymphatic: Lymph Nodes:  No other lymphadenopathy present  Skin:  General Inspection:  No rashes present, no lesions present, no areas of "  discoloration  Neurologic:    Mental Status:  Oriented X3.  Normal strength and tone, sensory exam                grossly normal, mentation intact and speech normal.    Psychiatric:   Mentation appears normal and affect normal/bright.         Pelvic Exam:  External Genitalia:     Normal appearance for age, no discharge present, no tenderness present, no inflammatory lesions present, color normal  Vagina:     Normal vaginal vault without central or paravaginal defects, no discharge present, no inflammatory lesions present, no masses present  Bladder:     Nontender to palpation  Urethra:   Urethral Body:  Urethra palpation normal, urethra structural support normal   Urethral Meatus:  No erythema or lesions present  Cervix:     Appearance healthy, no lesions present, nontender to palpation, no bleeding present  Uterus:     Uterus: firm, normal sized and nontender, anteverted in position.   Adnexa:     No adnexal tenderness present, no adnexal masses present  Perineum:     Perineum within normal limits, no evidence of trauma, no rashes or skin lesions present  Anus:     Anus within normal limits, no hemorrhoids present  Inguinal Lymph Nodes:     No lymphadenopathy present  Pubic Hair:     Normal pubic hair distribution for age  Genitalia and Groin:     No rashes present, no lesions present, no areas of discoloration, no masses present      COUNSELING:   Reviewed preventive health counseling, as reflected in patient instructions  Special attention given to:        Regular exercise       Healthy diet/nutrition       (Rani)menopause management    BMI: Body mass index is 26.43 kg/m .  Weight management plan: Discussed healthy diet and exercise guidelines    ASSESSMENT:  49 year old female with satisfactory annual exam.    ICD-10-CM    1. Encounter for gynecological examination without abnormal finding  Z01.419 Pap imaged thin layer screen with HPV - recommended age 30 - 65     HPV High Risk Types DNA Cervical   2. Night  sweats  R61 estradiol (VIVELLE-DOT) 0.1 MG/24HR bi-weekly patch     medroxyPROGESTERone (PROVERA) 10 MG tablet   3. Recurrent major depression in remission (H)  F33.40 sertraline (ZOLOFT) 100 MG tablet   4. Anxiety  F41.9 sertraline (ZOLOFT) 100 MG tablet   5. Perimenopausal vasomotor symptoms  N95.1    6. Migraine without status migrainosus, not intractable, unspecified migraine type  G43.909 SUMAtriptan (IMITREX) 100 MG tablet   7. Hypothyroidism, unspecified type  E03.9 TSH     thyroid (ARMOUR) 60 MG tablet   8. Screening for lipid disorders  Z13.220    9. Screening for metabolic disorder  Z13.228        PLAN:  Pap and HPV today and based on results will follow ASCCP guidelines  mammo today  Refill sertraline 150mg daily for the year. Offered support and condolensces on the loss of her . Discussed support group and counseling, etc as well as future relationships  TSH today and if normal will fill her armour thyroid at 60mg daily  Refill imitrex for prn migraines  Not fasting today but getting lipids done through biometrics at work and normal    Discussed her vasomotor sx and DUB control with ocps vs HRT  Now that several more years have gone by is less likely to have DUB on HRT now than did previously and with higher provera dose rather than prometrium is also less likely than in the past. However her lack of good vasomotor sx control is likely b/c of ocps and would do better with HRT  Will attempt to transition back to that so that sx control is improved but also dose of hormones is lower now that 49 and doesn't need contraception  Will see how she's doing in a month or so and could increase estrogen if needed or add/switch to estratest or just testosterone if still refractory for hot flashes. If starts to have DUB could consider cyclic HRT as well  Spent an additional 10 min, 100% of which was in face to face counseling time, managing the vasomotor sx and hormone options    Teresa Longoria MD

## 2020-03-04 ENCOUNTER — ANCILLARY PROCEDURE (OUTPATIENT)
Dept: MAMMOGRAPHY | Facility: CLINIC | Age: 50
End: 2020-03-04
Payer: COMMERCIAL

## 2020-03-04 ENCOUNTER — OFFICE VISIT (OUTPATIENT)
Dept: OBGYN | Facility: CLINIC | Age: 50
End: 2020-03-04
Payer: COMMERCIAL

## 2020-03-04 VITALS
WEIGHT: 151.6 LBS | DIASTOLIC BLOOD PRESSURE: 68 MMHG | HEART RATE: 84 BPM | HEIGHT: 64 IN | BODY MASS INDEX: 25.88 KG/M2 | SYSTOLIC BLOOD PRESSURE: 120 MMHG

## 2020-03-04 DIAGNOSIS — Z13.220 SCREENING FOR LIPID DISORDERS: ICD-10-CM

## 2020-03-04 DIAGNOSIS — F41.9 ANXIETY: ICD-10-CM

## 2020-03-04 DIAGNOSIS — Z12.31 VISIT FOR SCREENING MAMMOGRAM: ICD-10-CM

## 2020-03-04 DIAGNOSIS — R61 NIGHT SWEATS: ICD-10-CM

## 2020-03-04 DIAGNOSIS — G43.909 MIGRAINE WITHOUT STATUS MIGRAINOSUS, NOT INTRACTABLE, UNSPECIFIED MIGRAINE TYPE: ICD-10-CM

## 2020-03-04 DIAGNOSIS — F33.40 RECURRENT MAJOR DEPRESSION IN REMISSION (H): ICD-10-CM

## 2020-03-04 DIAGNOSIS — N95.1 PERIMENOPAUSAL VASOMOTOR SYMPTOMS: ICD-10-CM

## 2020-03-04 DIAGNOSIS — Z13.228 SCREENING FOR METABOLIC DISORDER: ICD-10-CM

## 2020-03-04 DIAGNOSIS — Z01.419 ENCOUNTER FOR GYNECOLOGICAL EXAMINATION WITHOUT ABNORMAL FINDING: Primary | ICD-10-CM

## 2020-03-04 DIAGNOSIS — E03.9 HYPOTHYROIDISM, UNSPECIFIED TYPE: ICD-10-CM

## 2020-03-04 PROCEDURE — 99396 PREV VISIT EST AGE 40-64: CPT | Performed by: OBSTETRICS & GYNECOLOGY

## 2020-03-04 PROCEDURE — 99213 OFFICE O/P EST LOW 20 MIN: CPT | Mod: 25 | Performed by: OBSTETRICS & GYNECOLOGY

## 2020-03-04 PROCEDURE — 84443 ASSAY THYROID STIM HORMONE: CPT | Performed by: OBSTETRICS & GYNECOLOGY

## 2020-03-04 PROCEDURE — 87624 HPV HI-RISK TYP POOLED RSLT: CPT | Performed by: OBSTETRICS & GYNECOLOGY

## 2020-03-04 PROCEDURE — 77067 SCR MAMMO BI INCL CAD: CPT | Mod: TC

## 2020-03-04 PROCEDURE — 36415 COLL VENOUS BLD VENIPUNCTURE: CPT | Performed by: OBSTETRICS & GYNECOLOGY

## 2020-03-04 PROCEDURE — G0145 SCR C/V CYTO,THINLAYER,RESCR: HCPCS | Performed by: OBSTETRICS & GYNECOLOGY

## 2020-03-04 RX ORDER — SUMATRIPTAN 100 MG/1
100 TABLET, FILM COATED ORAL
Qty: 8 TABLET | Refills: 3 | Status: SHIPPED | OUTPATIENT
Start: 2020-03-04 | End: 2021-03-05

## 2020-03-04 RX ORDER — SERTRALINE HYDROCHLORIDE 100 MG/1
150 TABLET, FILM COATED ORAL DAILY
Qty: 135 TABLET | Refills: 3 | Status: SHIPPED | OUTPATIENT
Start: 2020-03-04 | End: 2020-09-21

## 2020-03-04 RX ORDER — MEDROXYPROGESTERONE ACETATE 10 MG
10 TABLET ORAL DAILY
Qty: 90 TABLET | Refills: 4 | Status: SHIPPED | OUTPATIENT
Start: 2020-03-04 | End: 2020-09-21

## 2020-03-04 RX ORDER — NORETHINDRONE ACETATE AND ETHINYL ESTRADIOL .03; 1.5 MG/1; MG/1
1 TABLET ORAL DAILY
Qty: 112 TABLET | Refills: 3 | Status: CANCELLED | OUTPATIENT
Start: 2020-03-04

## 2020-03-04 RX ORDER — ESTRADIOL 0.1 MG/D
1 FILM, EXTENDED RELEASE TRANSDERMAL
Qty: 24 PATCH | Refills: 3 | Status: SHIPPED | OUTPATIENT
Start: 2020-03-05 | End: 2020-09-21

## 2020-03-04 ASSESSMENT — ANXIETY QUESTIONNAIRES
5. BEING SO RESTLESS THAT IT IS HARD TO SIT STILL: NOT AT ALL
1. FEELING NERVOUS, ANXIOUS, OR ON EDGE: SEVERAL DAYS
7. FEELING AFRAID AS IF SOMETHING AWFUL MIGHT HAPPEN: NOT AT ALL
3. WORRYING TOO MUCH ABOUT DIFFERENT THINGS: NOT AT ALL
6. BECOMING EASILY ANNOYED OR IRRITABLE: NOT AT ALL
2. NOT BEING ABLE TO STOP OR CONTROL WORRYING: NOT AT ALL
GAD7 TOTAL SCORE: 2
IF YOU CHECKED OFF ANY PROBLEMS ON THIS QUESTIONNAIRE, HOW DIFFICULT HAVE THESE PROBLEMS MADE IT FOR YOU TO DO YOUR WORK, TAKE CARE OF THINGS AT HOME, OR GET ALONG WITH OTHER PEOPLE: SOMEWHAT DIFFICULT

## 2020-03-04 ASSESSMENT — MIFFLIN-ST. JEOR: SCORE: 1279.71

## 2020-03-04 ASSESSMENT — PATIENT HEALTH QUESTIONNAIRE - PHQ9
SUM OF ALL RESPONSES TO PHQ QUESTIONS 1-9: 4
5. POOR APPETITE OR OVEREATING: SEVERAL DAYS

## 2020-03-05 LAB — TSH SERPL DL<=0.005 MIU/L-ACNC: 2.7 MU/L (ref 0.4–4)

## 2020-03-05 ASSESSMENT — ANXIETY QUESTIONNAIRES: GAD7 TOTAL SCORE: 2

## 2020-03-06 LAB
COPATH REPORT: NORMAL
PAP: NORMAL

## 2020-03-06 RX ORDER — THYROID 60 MG/1
60 TABLET ORAL DAILY
Qty: 90 TABLET | Refills: 3 | Status: SHIPPED | OUTPATIENT
Start: 2020-03-06 | End: 2020-09-21

## 2020-03-09 LAB
FINAL DIAGNOSIS: NORMAL
HPV HR 12 DNA CVX QL NAA+PROBE: NEGATIVE
HPV16 DNA SPEC QL NAA+PROBE: NEGATIVE
HPV18 DNA SPEC QL NAA+PROBE: NEGATIVE
SPECIMEN DESCRIPTION: NORMAL
SPECIMEN SOURCE CVX/VAG CYTO: NORMAL

## 2020-03-26 DIAGNOSIS — N95.1 PERIMENOPAUSAL VASOMOTOR SYMPTOMS: ICD-10-CM

## 2020-03-26 RX ORDER — NORETHINDRONE ACETATE AND ETHINYL ESTRADIOL 1.5; 3 MG/1; UG/1
TABLET ORAL
Qty: 105 TABLET | OUTPATIENT
Start: 2020-03-26

## 2020-03-26 NOTE — TELEPHONE ENCOUNTER
"Requested Prescriptions   Pending Prescriptions Disp Refills     JUNEL 1.5/30 1.5-30 MG-MCG tablet [Pharmacy Med Name: JUNEL 1.5/30 TABLETS 21] 105 tablet      Sig: TAKE 1 TABLET BY MOUTH DAILY CONTINOUSLY       Contraceptives Protocol Passed - 3/26/2020  3:37 AM        Passed - Patient is not a current smoker if age is 35 or older        Passed - Recent (12 mo) or future (30 days) visit within the authorizing provider's specialty     Patient has had an office visit with the authorizing provider or a provider within the authorizing providers department within the previous 12 mos or has a future within next 30 days. See \"Patient Info\" tab in inbasket, or \"Choose Columns\" in Meds & Orders section of the refill encounter.              Passed - Medication is active on med list        Passed - No active pregnancy on record        Passed - No positive pregnancy test in past 12 months           Last Written Prescription Date:  2/27/19  Last Fill Quantity: 112,  # refills: 3   Last office visit: 3/4/2020 with prescribing provider:  Von   Future Office Visit:    Discussed her vasomotor sx and DUB control with ocps vs HRT  Now that several more years have gone by is less likely to have DUB on HRT now than did previously and with higher provera dose rather than prometrium is also less likely than in the past. However her lack of good vasomotor sx control is likely b/c of ocps and would do better with HRT  Will attempt to transition back to that so that sx control is improved but also dose of hormones is lower now that 49 and doesn't need contraception  Will see how she's doing in a month or so and could increase estrogen if needed or add/switch to estratest or just testosterone if still refractory for hot flashes. If starts to have DUB could consider cyclic HRT as well  Spent an additional 10 min, 100% of which was in face to face counseling time, managing the vasomotor sx and hormone options  Refill request refused due to " ":   [] Refills available at pharmacy.  Request sent back to pharmacy as \"duplicate\"  [x] Patient report no longer needing it  [] Medication discontinued     Cara Lowry RN on 3/26/2020 at 8:19 AM    "

## 2020-06-25 ENCOUNTER — TRANSFERRED RECORDS (OUTPATIENT)
Dept: HEALTH INFORMATION MANAGEMENT | Facility: CLINIC | Age: 50
End: 2020-06-25

## 2020-06-25 LAB
ALT SERPL-CCNC: 31 IU/L (ref 5–35)
AST SERPL-CCNC: 30 U/L (ref 5–34)
CREAT SERPL-MCNC: 0.89 MG/DL (ref 0.5–1.3)

## 2020-07-15 ENCOUNTER — E-VISIT (OUTPATIENT)
Dept: OBGYN | Facility: CLINIC | Age: 50
End: 2020-07-15
Payer: COMMERCIAL

## 2020-07-15 DIAGNOSIS — N30.00 ACUTE CYSTITIS WITHOUT HEMATURIA: Primary | ICD-10-CM

## 2020-07-15 PROCEDURE — 99207 ZZC NO BILLABLE SERVICE THIS VISIT: CPT | Performed by: OBSTETRICS & GYNECOLOGY

## 2020-07-16 ENCOUNTER — NURSE TRIAGE (OUTPATIENT)
Dept: NURSING | Facility: CLINIC | Age: 50
End: 2020-07-16

## 2020-07-16 NOTE — TELEPHONE ENCOUNTER
Patient asking about the response to e-visit re: urinary problems sent to Dr. Longoria.  FNA advised encounter is still open and no indication that the doctor has reviewed it yet.  FNA advised she try [email or web address] if she cannot wait on the eval by Dr. Longoria.  Patient says she is uncertain if she will wait or not but will take a look at [email or web address].    Additional Information    Negative: Requesting regular office appointment    Negative: [1] Caller requesting NON-URGENT health information AND [2] PCP's office is the best resource    Negative: RN needs further essential information from caller in order to complete triage    Negative: [1] Caller is not with the adult (patient) AND [2] reporting urgent symptoms    Negative: Lab result questions    Negative: Medication questions    Negative: Caller can't be reached by phone    Negative: Caller has already spoken to PCP or another triager    Health Information question, no triage required and triager able to answer question    Protocols used: INFORMATION ONLY CALL-A-

## 2020-07-17 RX ORDER — SULFAMETHOXAZOLE/TRIMETHOPRIM 800-160 MG
1 TABLET ORAL 2 TIMES DAILY
Qty: 14 TABLET | Refills: 0 | Status: SHIPPED | OUTPATIENT
Start: 2020-07-17 | End: 2020-07-24

## 2020-08-17 ENCOUNTER — TELEPHONE (OUTPATIENT)
Dept: OBGYN | Facility: CLINIC | Age: 50
End: 2020-08-17

## 2020-08-17 DIAGNOSIS — E03.9 HYPOTHYROIDISM, UNSPECIFIED TYPE: Primary | ICD-10-CM

## 2020-08-17 NOTE — TELEPHONE ENCOUNTER
Patient is tired all of the time and thinks her Thyroid may be off. Does she want her to check it here or go to her Primary Dr.? Please ask Dr. Tsang's and call patient back.

## 2020-08-18 NOTE — TELEPHONE ENCOUNTER
We just checked it 3/20 and it was well within the normal range. She's on humira and methotrexate and has an autoimmune d.o, in a pandemic and menopausal on HRT. There's so many other reasons for fatigue. If she wants to come in and check then that's fine but it's unlikely the case.   Can do a tsh and a free t4 if she still wants to do it

## 2020-08-18 NOTE — TELEPHONE ENCOUNTER
Called pt and left detailed vm with Dr Longoria response below. Call if wishes to proceed with thyroid labs - will need to place orders and then schedule lab visit.    Yajaira Noland RN on 8/18/2020 at 11:46 AM

## 2020-09-21 DIAGNOSIS — E03.9 HYPOTHYROIDISM, UNSPECIFIED TYPE: ICD-10-CM

## 2020-09-21 DIAGNOSIS — R61 NIGHT SWEATS: ICD-10-CM

## 2020-09-21 DIAGNOSIS — F41.9 ANXIETY: ICD-10-CM

## 2020-09-21 DIAGNOSIS — F33.40 RECURRENT MAJOR DEPRESSION IN REMISSION (H): ICD-10-CM

## 2020-09-21 RX ORDER — SERTRALINE HYDROCHLORIDE 100 MG/1
150 TABLET, FILM COATED ORAL DAILY
Qty: 135 TABLET | Refills: 2 | Status: SHIPPED | OUTPATIENT
Start: 2020-09-21 | End: 2021-03-05

## 2020-09-21 RX ORDER — MEDROXYPROGESTERONE ACETATE 10 MG
10 TABLET ORAL DAILY
Qty: 90 TABLET | Refills: 2 | Status: SHIPPED | OUTPATIENT
Start: 2020-09-21 | End: 2021-01-13

## 2020-09-21 RX ORDER — THYROID 60 MG/1
60 TABLET ORAL DAILY
Qty: 90 TABLET | Refills: 2 | Status: SHIPPED | OUTPATIENT
Start: 2020-09-21 | End: 2021-06-07

## 2020-09-21 RX ORDER — ESTRADIOL 0.1 MG/D
1 FILM, EXTENDED RELEASE TRANSDERMAL
Qty: 24 PATCH | Refills: 2 | Status: SHIPPED | OUTPATIENT
Start: 2020-09-21 | End: 2021-03-05

## 2020-09-21 NOTE — TELEPHONE ENCOUNTER
"Requested Prescriptions   Pending Prescriptions Disp Refills     medroxyPROGESTERone (PROVERA) 10 MG tablet 90 tablet 4     Sig: Take 1 tablet (10 mg) by mouth daily       There is no refill protocol information for this order        estradiol (VIVELLE-DOT) 0.1 MG/24HR bi-weekly patch 24 patch 3     Sig: Place 1 patch onto the skin twice a week       Hormone Replacement Therapy Passed - 9/21/2020  1:29 PM        Passed - Blood pressure under 140/90 in past 12 months     BP Readings from Last 3 Encounters:   03/04/20 120/68   12/09/19 102/70   10/30/19 119/62                 Passed - Recent (12 mo) or future (30 days) visit within the authorizing provider's specialty     Patient has had an office visit with the authorizing provider or a provider within the authorizing providers department within the previous 12 mos or has a future within next 30 days. See \"Patient Info\" tab in inbasket, or \"Choose Columns\" in Meds & Orders section of the refill encounter.              Passed - Patient has mammogram in past 2 years on file if age 50-75        Passed - Medication is active on med list        Passed - Patient is 18 years of age or older        Passed - No active pregnancy on record        Passed - No positive pregnancy test on record in past 12 months           thyroid (ARMOUR) 60 MG tablet 90 tablet 3     Sig: Take 1 tablet (60 mg) by mouth daily       Thyroid Protocol Passed - 9/21/2020  1:29 PM        Passed - Patient is 12 years or older        Passed - Recent (12 mo) or future (30 days) visit within the authorizing provider's specialty     Patient has had an office visit with the authorizing provider or a provider within the authorizing providers department within the previous 12 mos or has a future within next 30 days. See \"Patient Info\" tab in inbasket, or \"Choose Columns\" in Meds & Orders section of the refill encounter.              Passed - Medication is active on med list        Passed - Normal TSH on file in " "past 12 months     Recent Labs   Lab Test 03/04/20  1524   TSH 2.70              Passed - No active pregnancy on record     If patient is pregnant or has had a positive pregnancy test, please check TSH.          Passed - No positive pregnancy test in past 12 months     If patient is pregnant or has had a positive pregnancy test, please check TSH.             sertraline (ZOLOFT) 100 MG tablet 135 tablet 3     Sig: Take 1.5 tablets (150 mg) by mouth daily       SSRIs Protocol Failed - 9/21/2020  1:29 PM        Failed - PHQ-9 score less than 5 in past 6 months     Please review last PHQ-9 score.           Failed - Recent (6 mo) or future (30 days) visit within the authorizing provider's specialty     Patient had office visit in the last 6 months or has a visit in the next 30 days with authorizing provider or within the authorizing provider's specialty.  See \"Patient Info\" tab in inbasket, or \"Choose Columns\" in Meds & Orders section of the refill encounter.            Passed - Medication is active on med list        Passed - Patient is age 18 or older        Passed - No active pregnancy on record        Passed - No positive pregnancy test in last 12 months           Last Written Prescription Date:  3/4/20  Last Fill Quantity: 90 day supplye,  # refills: 3   Last office visit: 3/4/2020 with prescribing provider:  Dr Teresa Longoria   Future Office Visit:  None    RECEIVED FROM [facility] ORDER [facility]ARM[facility]Y, PER PATIENT REQUEST.          "

## 2020-09-21 NOTE — TELEPHONE ENCOUNTER
Prescription approved per [facility] Refill Protocol.  Cara Lowry RN on 9/21/2020 at 1:52 PM

## 2020-09-24 ENCOUNTER — TRANSFERRED RECORDS (OUTPATIENT)
Dept: HEALTH INFORMATION MANAGEMENT | Facility: CLINIC | Age: 50
End: 2020-09-24

## 2020-09-24 LAB
ALT SERPL-CCNC: 21 IU/L (ref 5–35)
AST SERPL-CCNC: 25 U/L (ref 5–34)
CREAT SERPL-MCNC: 0.91 MG/DL (ref 0.5–1.3)

## 2020-09-25 ENCOUNTER — MYC MEDICAL ADVICE (OUTPATIENT)
Dept: OBGYN | Facility: CLINIC | Age: 50
End: 2020-09-25

## 2020-09-25 DIAGNOSIS — B00.1 HERPES LABIALIS: ICD-10-CM

## 2020-09-25 RX ORDER — VALACYCLOVIR HYDROCHLORIDE 1 G/1
2000 TABLET, FILM COATED ORAL 2 TIMES DAILY PRN
Qty: 12 TABLET | Refills: 1 | Status: SHIPPED | OUTPATIENT
Start: 2020-09-25 | End: 2021-03-05

## 2020-09-25 NOTE — TELEPHONE ENCOUNTER
Requested Prescriptions   Pending Prescriptions Disp Refills     valACYclovir (VALTREX) 1000 mg tablet 12 tablet 1     Sig: Take 2 tablets (2,000 mg) by mouth 2 times daily as needed       There is no refill protocol information for this order        Last Written Prescription Date:  2/27/19  Last Fill Quantity: 12,  # refills: 1   Last office visit: 3/4/2020 with prescribing provider:  DR Longoria  For annual   Future Office Visit:      Prescription approved per [facility] [place].  Yajaira Noland RN on 9/25/2020 at 1:31 PM

## 2020-10-07 ENCOUNTER — MYC MEDICAL ADVICE (OUTPATIENT)
Dept: OBGYN | Facility: CLINIC | Age: 50
End: 2020-10-07

## 2020-10-07 DIAGNOSIS — Z13.29 SCREENING FOR THYROID DISORDER: ICD-10-CM

## 2020-10-07 DIAGNOSIS — Z13.6 SCREENING FOR CARDIOVASCULAR CONDITION: ICD-10-CM

## 2020-10-07 DIAGNOSIS — Z01.419 ENCOUNTER FOR GYNECOLOGICAL EXAMINATION WITHOUT ABNORMAL FINDING: ICD-10-CM

## 2020-10-07 DIAGNOSIS — N93.9 ABNORMAL UTERINE BLEEDING: Primary | ICD-10-CM

## 2020-10-07 DIAGNOSIS — Z13.228 SCREENING FOR METABOLIC DISORDER: ICD-10-CM

## 2020-10-07 NOTE — TELEPHONE ENCOUNTER
We've assumed that this was always her HRT so have adjusted it a few times. However we've not done an U/S in a long time (if ever) from what I can see. I'd like her to come in for an U/S, we may have to consider an EMBx but will see what the U/S shows first.  If that's all normal then can adjsut HRT. However the postcoital bleeding may or may  Not be HRT/uterine bleeding related so would do an exam to figure that out

## 2020-10-14 NOTE — TELEPHONE ENCOUNTER
,  Are you ok with ordering labs prior to patients appointment on 11/4/2020. Needs biometric screen prior to 10/31/2020. We can schedule her for a nurse and lab only appt to have completed.    Belgica Sanchez RN

## 2020-10-23 ENCOUNTER — ALLIED HEALTH/NURSE VISIT (OUTPATIENT)
Dept: NURSING | Facility: CLINIC | Age: 50
End: 2020-10-23
Payer: COMMERCIAL

## 2020-10-23 DIAGNOSIS — Z13.6 SCREENING FOR CARDIOVASCULAR CONDITION: ICD-10-CM

## 2020-10-23 DIAGNOSIS — Z13.228 SCREENING FOR METABOLIC DISORDER: ICD-10-CM

## 2020-10-23 DIAGNOSIS — Z23 NEED FOR PROPHYLACTIC VACCINATION AND INOCULATION AGAINST INFLUENZA: Primary | ICD-10-CM

## 2020-10-23 DIAGNOSIS — Z13.29 SCREENING FOR THYROID DISORDER: ICD-10-CM

## 2020-10-23 PROCEDURE — 90682 RIV4 VACC RECOMBINANT DNA IM: CPT

## 2020-10-23 PROCEDURE — 80053 COMPREHEN METABOLIC PANEL: CPT | Performed by: OBSTETRICS & GYNECOLOGY

## 2020-10-23 PROCEDURE — 84443 ASSAY THYROID STIM HORMONE: CPT | Performed by: OBSTETRICS & GYNECOLOGY

## 2020-10-23 PROCEDURE — 80061 LIPID PANEL: CPT | Performed by: OBSTETRICS & GYNECOLOGY

## 2020-10-23 PROCEDURE — 36415 COLL VENOUS BLD VENIPUNCTURE: CPT | Performed by: OBSTETRICS & GYNECOLOGY

## 2020-10-23 PROCEDURE — 99207 PR NO CHARGE NURSE ONLY: CPT

## 2020-10-23 PROCEDURE — 90471 IMMUNIZATION ADMIN: CPT

## 2020-10-24 LAB
ALBUMIN SERPL-MCNC: 4 G/DL (ref 3.4–5)
ALP SERPL-CCNC: 82 U/L (ref 40–150)
ALT SERPL W P-5'-P-CCNC: 36 U/L (ref 0–50)
ANION GAP SERPL CALCULATED.3IONS-SCNC: 3 MMOL/L (ref 3–14)
AST SERPL W P-5'-P-CCNC: 22 U/L (ref 0–45)
BILIRUB SERPL-MCNC: 0.4 MG/DL (ref 0.2–1.3)
BUN SERPL-MCNC: 12 MG/DL (ref 7–30)
CALCIUM SERPL-MCNC: 9.5 MG/DL (ref 8.5–10.1)
CHLORIDE SERPL-SCNC: 108 MMOL/L (ref 94–109)
CHOLEST SERPL-MCNC: 190 MG/DL
CO2 SERPL-SCNC: 28 MMOL/L (ref 20–32)
CREAT SERPL-MCNC: 0.88 MG/DL (ref 0.52–1.04)
GFR SERPL CREATININE-BSD FRML MDRD: 75 ML/MIN/{1.73_M2}
GLUCOSE SERPL-MCNC: 78 MG/DL (ref 70–99)
HDLC SERPL-MCNC: 70 MG/DL
LDLC SERPL CALC-MCNC: 104 MG/DL
NONHDLC SERPL-MCNC: 120 MG/DL
POTASSIUM SERPL-SCNC: 4.1 MMOL/L (ref 3.4–5.3)
PROT SERPL-MCNC: 7.2 G/DL (ref 6.8–8.8)
SODIUM SERPL-SCNC: 139 MMOL/L (ref 133–144)
TRIGL SERPL-MCNC: 79 MG/DL
TSH SERPL DL<=0.005 MIU/L-ACNC: 0.97 MU/L (ref 0.4–4)

## 2020-11-03 NOTE — PROGRESS NOTES
SUBJECTIVE:                                                   Shelby Asencio is a 50 year old female who presents to clinic today for the following health issue(s):  Patient presents with:  Follow Up: ultrasound - abnormal uterine bleeding      HPI:  Patient was not fully menopausal when started on HRT. Has had several adjustments in dosing and progestin changes from prometrium to provera and cyclic vs daily.  Vasomotor sx are all under control but has had irregular bleeding for quite a while, always attributed to the HRT as prior to that had had normal periods  Patient is here to f/u on a pelvic U/S to eval this further.    U/S today shows 2 intramural fibroids that are right post 2.6cm and midline post 1cm  essure coils are seen  Both ovaries are small and no cysts  The EMS is 8.7mm which is not overtly thickened for perimenopause on HRT. An area suspicious for a polyp was noted in the YSABEL, posterior wall measuring 1.7cm  There were also some more enlarged parametrial vessels noted as well  Patient has had some cramping and general fullness/heaviness in her pelvis but has attributed it to the DUB  Her h.o is also positive for +HPV. Last pap was nil/neg but one year ago was nil/hpv 16. No dysplasia was found though and yet she is very worried about this  Recently started dating someone new since being  just shy of a year ago and knows that is a risk for HPV and cervical cancer as well    No LMP recorded. (Menstrual status: Irregular Periods).    Patient is sexually active, .  Using none for contraception.    reports that she has never smoked. She has never used smokeless tobacco.    STD testing offered?  Declined    Health maintenance updated:  yes    Today's PHQ-2 Score:   PHQ-2 (  [employer]) 3/2/2020   Q1: Little interest or pleasure in doing things 1   Q2: Feeling down, depressed or hopeless 1   PHQ-2 Score 2   Q1: Little interest or pleasure in doing things Several days   Q2: Feeling down,  depressed or hopeless Several days   PHQ-2 Score 2     Today's PHQ-9 Score:   PHQ-9 SCORE 3/4/2020   PHQ-9 Total Score -   PHQ-9 Total Score 4     Today's MOOSE-7 Score:   MOOSE-7 SCORE 3/4/2020   Total Score 2       Problem list and histories reviewed & adjusted, as indicated.  Additional history: as documented.    Patient Active Problem List   Diagnosis     Acquired hypothyroidism     CARDIOVASCULAR SCREENING; LDL GOAL LESS THAN 160     Cluster headache     Anxiety     Mild major depression (H)     Dizziness     Low back pain     Hyperhidrosis of soles     AS (ankylosing spondylitis) (H)     Herpes labialis     Major depression     Sjogren's syndrome (H)     TMJ (temporomandibular joint syndrome)     Herpes zoster     OA (osteoarthritis)     Post-menopause on HRT (hormone replacement therapy)     Symptoms, such as flushing, sleeplessness, headache, lack of concentration, associated with the menopause     Cervical high risk HPV (human papillomavirus) test positive     DUB (dysfunctional uterine bleeding)     Endometrial polyp     Intramural leiomyoma of uterus     Cervical high risk human papillomavirus (HPV) DNA test positive     Past Surgical History:   Procedure Laterality Date     COLONOSCOPY N/A 12/9/2019    Procedure: COLONOSCOPY;  Surgeon: Gem Elder MD;  Location:  GI     HC HYSTEROS W PERMANENT FALLOPAIN IMPLANT  8/30/2010    By Dr. Longoria     HC HYSTEROSALPINGOGRAM  12/28/10    post essure bilateral tubal occlusion     MAMMOPLASTY AUGMENTATION  2008     ROTATOR CUFF REPAIR RT/LT  4/12      Social History     Tobacco Use     Smoking status: Never Smoker     Smokeless tobacco: Never Used   Substance Use Topics     Alcohol use: Yes     Alcohol/week: 0.0 standard drinks     Comment: 1/week      Problem (# of Occurrences) Relation (Name,Age of Onset)    Cancer (2) Brother: panc ca, Sister: skin cancer    Coronary Artery Disease (1) Father: mi, PVD     Diabetes (1) Maternal Grandfather    Hypertension  "(2) Father, Mother    Other Cancer (1) Father: skin ca    Rheumatoid Arthritis (2) Father, Sister: passed away    Substance Abuse (1) Brother: alcohol    Thyroid Disease (1) Maternal Grandfather            Current Outpatient Medications   Medication Sig     adalimumab (HUMIRA) 10 MG/0.2ML prefilled syringe kit Inject 10 mg Subcutaneous every 14 days ON HOLD     cevimeline (EVOXAC) 30 MG capsule Take 30 mg by mouth 3 times daily as needed     Cholecalciferol (VITAMIN D PO)      cyclobenzaprine (FLEXERIL) 10 MG tablet Take 0.5-1 tablets (5-10 mg) by mouth 2 times daily as needed for muscle spasms     estradiol (VIVELLE-DOT) 0.1 MG/24HR bi-weekly patch Place 1 patch onto the skin twice a week     medroxyPROGESTERone (PROVERA) 10 MG tablet Take 1 tablet (10 mg) by mouth daily     methotrexate 2.5 MG tablet Take by mouth every 7 days Takes 6 tabs  Every Sunday     omeprazole 20 MG tablet Take 20 mg by mouth daily     sertraline (ZOLOFT) 100 MG tablet Take 1.5 tablets (150 mg) by mouth daily     SUMAtriptan (IMITREX) 100 MG tablet Take 1 tablet (100 mg) by mouth at onset of headache     thyroid (ARMOUR) 60 MG tablet Take 1 tablet (60 mg) by mouth daily     valACYclovir (VALTREX) 1000 mg tablet Take 2 tablets (2,000 mg) by mouth 2 times daily as needed (for outbreaks)     No current facility-administered medications for this visit.      Allergies   Allergen Reactions     Penicillins Rash       ROS:  12 point review of systems negative other than symptoms noted below or in the HPI.  Genitourinary: Cramps, Irregular Menses, Pelvic Pain and Spotting  No urinary frequency or dysuria, bladder or kidney problems, POSITIVE for:, irregular menses      OBJECTIVE:     /68   Ht 1.613 m (5' 3.5\")   Wt 69.9 kg (154 lb)   BMI 26.85 kg/m    Body mass index is 26.85 kg/m .    Exam:  Constitutional:  Appearance: Well nourished, well developed alert, in no acute distress  Neck:  Lymph Nodes:  No lymphadenopathy present; Thyroid:  " Gland size normal, nontender, no nodules or masses present on palpation  Chest:  Respiratory Effort:  Breathing unlabored. Clear to auscultation bilaterally.   Cardiovascular: Heart: Auscultation:  Regular rate, normal rhythm, no murmurs present  Gastrointestinal:  Abdominal Examination:  Abdomen nontender to palpation, tone normal without rigidity or guarding, no masses present, umbilicus without lesions; Liver/Spleen:  No hepatomegaly present, liver nontender to palpation; Hernias:  No hernias present  Neurologic:  Mental Status:  Oriented X3.  Normal strength and tone, sensory exam grossly normal, mentation intact and speech normal.    Psychiatric:  Mentation appears normal and affect normal/bright.     In-Clinic Test Results:  No results found for this or any previous visit (from the past 24 hour(s)).    ASSESSMENT/PLAN:                                                        ICD-10-CM    1. DUB (dysfunctional uterine bleeding)  N93.8    2. Post-menopause on HRT (hormone replacement therapy)  Z79.890    3. Fibroids  D21.9    4. Endometrial polyp  N84.0    5. Cervical high risk HPV (human papillomavirus) test positive  R87.810          Patient has had DUB for quite a while while on HRT despite several changes in types of meds and doses of meds  Now with endometrial polyp likely the cause of this DUB as well as intramural fibroids, less likely the cause of the bleeding but could be some of the pressure/fullness she feels.  She also has some pelvic varicosities that could be contributing to the pressure and worries about her HPV    Discussed doing HSC with polypectomy only, discussed HSC with myosure polypectomy and ablation though this would make it harder to eval future DUB and would still need progestin HRT  Discussed hysterectomy as well. TLH and BS or TLH and BSO  Patient is already on HRT and if not fully menopausal is likely close and a BSO wouldn't change her need for HRT and it's incumbent risks. However  could stop progestin which In the [facility] was the provera with ERT that had the higher risk.  Discussed the procedure itself, risks of bowel/bladder/ureter injury, risk of converting to open hyst, same day discharge vs obs stay and some impacts on surgery that may come up with covid that would impact staying. Discussed pain control and the benefits of overnight obs care vs going home same day  Patient very much wants to move forward with TLH/BSO and cystoscopy. Has help at home to recover and would be more than willing to go home same day if felt up to it and not concerned about that  Feels that anything short of hyst just continues to leave her with potential for continued issues since has so many factors in her gyn history  Will move forward with scheduling  Her and surgery scheduler will contact her    Patient is medically cleared for surgery and has had no issues with anesthesia in past  Will do CBC/T&S the day of surgery and get covid testing in advance    Spent 45 min with the patient, >50% of which was in face to face counseling time    Teresa Longoria MD  [facility] [place]

## 2020-11-04 ENCOUNTER — PREP FOR PROCEDURE (OUTPATIENT)
Dept: OBGYN | Facility: CLINIC | Age: 50
End: 2020-11-04

## 2020-11-04 ENCOUNTER — OFFICE VISIT (OUTPATIENT)
Dept: OBGYN | Facility: CLINIC | Age: 50
End: 2020-11-04
Attending: OBSTETRICS & GYNECOLOGY
Payer: COMMERCIAL

## 2020-11-04 ENCOUNTER — ANCILLARY PROCEDURE (OUTPATIENT)
Dept: ULTRASOUND IMAGING | Facility: CLINIC | Age: 50
End: 2020-11-04
Attending: OBSTETRICS & GYNECOLOGY
Payer: COMMERCIAL

## 2020-11-04 ENCOUNTER — TELEPHONE (OUTPATIENT)
Dept: OBGYN | Facility: CLINIC | Age: 50
End: 2020-11-04

## 2020-11-04 VITALS
DIASTOLIC BLOOD PRESSURE: 68 MMHG | WEIGHT: 154 LBS | BODY MASS INDEX: 26.29 KG/M2 | SYSTOLIC BLOOD PRESSURE: 126 MMHG | HEIGHT: 64 IN

## 2020-11-04 DIAGNOSIS — N84.0 ENDOMETRIAL POLYP: ICD-10-CM

## 2020-11-04 DIAGNOSIS — N93.8 DUB (DYSFUNCTIONAL UTERINE BLEEDING): Primary | ICD-10-CM

## 2020-11-04 DIAGNOSIS — R87.810 CERVICAL HIGH RISK HUMAN PAPILLOMAVIRUS (HPV) DNA TEST POSITIVE: ICD-10-CM

## 2020-11-04 DIAGNOSIS — Z79.890 POST-MENOPAUSE ON HRT (HORMONE REPLACEMENT THERAPY): ICD-10-CM

## 2020-11-04 DIAGNOSIS — N93.9 ABNORMAL UTERINE BLEEDING: ICD-10-CM

## 2020-11-04 DIAGNOSIS — D21.9 FIBROIDS: ICD-10-CM

## 2020-11-04 DIAGNOSIS — D25.1 INTRAMURAL LEIOMYOMA OF UTERUS: ICD-10-CM

## 2020-11-04 DIAGNOSIS — R87.810 CERVICAL HIGH RISK HPV (HUMAN PAPILLOMAVIRUS) TEST POSITIVE: ICD-10-CM

## 2020-11-04 PROCEDURE — 99215 OFFICE O/P EST HI 40 MIN: CPT | Mod: 25 | Performed by: OBSTETRICS & GYNECOLOGY

## 2020-11-04 PROCEDURE — 76830 TRANSVAGINAL US NON-OB: CPT | Performed by: OBSTETRICS & GYNECOLOGY

## 2020-11-04 ASSESSMENT — MIFFLIN-ST. JEOR: SCORE: 1285.6

## 2020-11-04 NOTE — TELEPHONE ENCOUNTER
Case ID [id number]   What needs to be changed on the case request? Procedure   Procedure HYSTERECTOMY, TOTAL, LAPAROSCOPIC, WITH BILATERAL SALPINGECTOMY   Note:  Enter the procedure that will be performed.   Procedure Description TOTAL LAPAROSCOPIC HYSTERECTOMY WITH BILATERAL SALPINGO-OOPHERECTOMY         DUB (dysfunctional uterine bleeding) [N93.8]  Endometrial polyp [N84.0]  Intramural leiomyoma of uterus [D25.1]  Cervical high risk human papillomavirus (HPV) DNA test positive [R87.810]    ASSIST: Masters    H&P TO BE COMPLETED BY:   Surgeon  FOR H&P TO BE DONE BY SURGEON   ALREADY DONE:  DATE  11/4/20  SAME DAY/OBSERVATION/INPATIENT: Plan same day discharge with potential for observation overnight    EQUIPMENT: ligasure, v-care, v-lock sutures, endostitch  VENDOR NEEDED AT CASE: No  LABS/SPECIAL INSTRUCTIONS: NA  TIME OFF WORK: 6 weeks

## 2020-11-05 PROBLEM — N93.8 DUB (DYSFUNCTIONAL UTERINE BLEEDING): Status: ACTIVE | Noted: 2020-11-05

## 2020-11-05 PROBLEM — N84.0 ENDOMETRIAL POLYP: Status: ACTIVE | Noted: 2020-11-05

## 2020-11-05 PROBLEM — D25.1 INTRAMURAL LEIOMYOMA OF UTERUS: Status: ACTIVE | Noted: 2020-11-05

## 2020-11-05 PROBLEM — R87.810 CERVICAL HIGH RISK HUMAN PAPILLOMAVIRUS (HPV) DNA TEST POSITIVE: Status: ACTIVE | Noted: 2020-11-05

## 2020-11-05 NOTE — TELEPHONE ENCOUNTER
Type of surgery: TLH BSO CYSTO  Location of surgery: [facility] OR  Date and time of surgery: 12/8/2020 8a  Surgeon: IAN robles/AYESHA  Pre-Op Appt Date: 11/4/2020 update at hospital  Post-Op Appt Date: TBD   Packet sent out: MAILED 11/5/2020  Pre-cert/Authorization completed:  TBD  Date: 11/5/2020 linh robles/Maame LAGOS TEST 12/5/2020 1p     Tessa Gardner  Surgery Scheduler    DX N93.8   N84.0   D25.1  CPT 16393

## 2020-11-06 DIAGNOSIS — Z11.59 ENCOUNTER FOR SCREENING FOR OTHER VIRAL DISEASES: Primary | ICD-10-CM

## 2020-12-05 DIAGNOSIS — Z11.59 ENCOUNTER FOR SCREENING FOR OTHER VIRAL DISEASES: ICD-10-CM

## 2020-12-05 PROCEDURE — U0003 INFECTIOUS AGENT DETECTION BY NUCLEIC ACID (DNA OR RNA); SEVERE ACUTE RESPIRATORY SYNDROME CORONAVIRUS 2 (SARS-COV-2) (CORONAVIRUS DISEASE [COVID-19]), AMPLIFIED PROBE TECHNIQUE, MAKING USE OF HIGH THROUGHPUT TECHNOLOGIES AS DESCRIBED BY CMS-2020-01-R: HCPCS | Performed by: OBSTETRICS & GYNECOLOGY

## 2020-12-07 LAB
LABORATORY COMMENT REPORT: NORMAL
SARS-COV-2 RNA SPEC QL NAA+PROBE: NEGATIVE
SARS-COV-2 RNA SPEC QL NAA+PROBE: NORMAL
SPECIMEN SOURCE: NORMAL
SPECIMEN SOURCE: NORMAL

## 2020-12-07 RX ORDER — FOLIC ACID 1 MG/1
1 TABLET ORAL DAILY
COMMUNITY

## 2020-12-07 NOTE — PROGRESS NOTES
PTA medications updated by Medication Scribe prior to surgery via phone call with patient      -LAST DOSES ENTERED BY NURSE-    Comments:    Medication history sources: Patient, Surescripts and H&P  Medication history source reliability: Good  Adherence assessment: N/A Not Observed    Significant changes made to the medication list:  None      Additional medication history information:   None        Prior to Admission medications    Medication Sig Last Dose Taking? Auth Provider   adalimumab (HUMIRA) 40 MG/0.8ML prefilled syringe kit Inject 40 mg Subcutaneous every 14 days  11/24/2020 Yes Reported, Patient   cevimeline (EVOXAC) 30 MG capsule Take 30 mg by mouth 3 times daily   Yes Reported, Patient   Cholecalciferol (VITAMIN D PO) Take 1 tablet by mouth daily  MORE THAN 1 WEEK Yes Reported, Patient   cyclobenzaprine (FLEXERIL) 10 MG tablet Take 0.5-1 tablets (5-10 mg) by mouth 2 times daily as needed for muscle spasms MORE THAN 1 MONTH at PRN Yes Guido Tapia APRN CNP   estradiol (VIVELLE-DOT) 0.1 MG/24HR bi-weekly patch Place 1 patch onto the skin twice a week New patch applied on 12/6/2020 Yes Teresa Longoria MD   folic acid (FOLVITE) 1 MG tablet Take 1 mg by mouth daily 12/7/2020 at AM Yes Reported, Patient   methotrexate 2.5 MG tablet Take 15 mg by mouth every 7 days On Sundays (takes 6 x 2.5mg) 12/6/2020 Yes Reported, Patient   omeprazole 20 MG tablet Take 20 mg by mouth daily 12/7/2020 at AM Yes Reported, Patient   sertraline (ZOLOFT) 100 MG tablet Take 1.5 tablets (150 mg) by mouth daily 12/7/2020 at AM Yes Teresa Longoria MD   SUMAtriptan (IMITREX) 100 MG tablet Take 1 tablet (100 mg) by mouth at onset of headache MORE THAN 1 WEEK at PRN Yes Teresa Longoria MD   thyroid (ARMOUR) 60 MG tablet Take 1 tablet (60 mg) by mouth daily  Yes Teresa Longoria MD   valACYclovir (VALTREX) 1000 mg tablet Take 2 tablets (2,000 mg) by mouth 2 times daily as needed (for outbreaks) MORE THAN 1 MONTH at PRN Yes  Teresa Longoria MD   medroxyPROGESTERone (PROVERA) 10 MG tablet Take 1 tablet (10 mg) by mouth daily 12/6/2020  Teresa Longoria MD

## 2020-12-08 ENCOUNTER — ANESTHESIA EVENT (OUTPATIENT)
Dept: SURGERY | Facility: CLINIC | Age: 50
End: 2020-12-08
Payer: COMMERCIAL

## 2020-12-08 ENCOUNTER — HOSPITAL ENCOUNTER (OUTPATIENT)
Facility: CLINIC | Age: 50
Discharge: HOME | End: 2020-12-08
Attending: OBSTETRICS & GYNECOLOGY | Admitting: OBSTETRICS & GYNECOLOGY
Payer: COMMERCIAL

## 2020-12-08 ENCOUNTER — ANESTHESIA (OUTPATIENT)
Dept: SURGERY | Facility: CLINIC | Age: 50
End: 2020-12-08
Payer: COMMERCIAL

## 2020-12-08 VITALS
DIASTOLIC BLOOD PRESSURE: 65 MMHG | HEIGHT: 64 IN | OXYGEN SATURATION: 97 % | RESPIRATION RATE: 12 BRPM | HEART RATE: 63 BPM | SYSTOLIC BLOOD PRESSURE: 103 MMHG | TEMPERATURE: 97.9 F | WEIGHT: 152.56 LBS | BODY MASS INDEX: 26.05 KG/M2

## 2020-12-08 DIAGNOSIS — N84.0 ENDOMETRIAL POLYP: ICD-10-CM

## 2020-12-08 DIAGNOSIS — Z90.721 S/P HYSTERECTOMY WITH OOPHORECTOMY: Primary | ICD-10-CM

## 2020-12-08 DIAGNOSIS — R87.810 CERVICAL HIGH RISK HUMAN PAPILLOMAVIRUS (HPV) DNA TEST POSITIVE: ICD-10-CM

## 2020-12-08 DIAGNOSIS — D25.1 INTRAMURAL LEIOMYOMA OF UTERUS: ICD-10-CM

## 2020-12-08 DIAGNOSIS — N93.8 DUB (DYSFUNCTIONAL UTERINE BLEEDING): ICD-10-CM

## 2020-12-08 DIAGNOSIS — Z90.710 S/P HYSTERECTOMY WITH OOPHORECTOMY: Primary | ICD-10-CM

## 2020-12-08 LAB
ABO + RH BLD: NORMAL
ABO + RH BLD: NORMAL
B-HCG SERPL-ACNC: <1 IU/L (ref 0–5)
BLD GP AB SCN SERPL QL: NORMAL
BLOOD BANK CMNT PATIENT-IMP: NORMAL
ERYTHROCYTE [DISTWIDTH] IN BLOOD BY AUTOMATED COUNT: 13.6 % (ref 10–15)
HCT VFR BLD AUTO: 39.7 % (ref 35–47)
HGB BLD-MCNC: 13.3 G/DL (ref 11.7–15.7)
MCH RBC QN AUTO: 31.9 PG (ref 26.5–33)
MCHC RBC AUTO-ENTMCNC: 33.5 G/DL (ref 31.5–36.5)
MCV RBC AUTO: 95 FL (ref 78–100)
PLATELET # BLD AUTO: 268 10E9/L (ref 150–450)
RBC # BLD AUTO: 4.17 10E12/L (ref 3.8–5.2)
SPECIMEN EXP DATE BLD: NORMAL
WBC # BLD AUTO: 5.9 10E9/L (ref 4–11)

## 2020-12-08 PROCEDURE — 999N000138 HC STATISTIC PRE-PROCEDURE ASSESSMENT I: Performed by: OBSTETRICS & GYNECOLOGY

## 2020-12-08 PROCEDURE — 250N000009 HC RX 250: Performed by: OBSTETRICS & GYNECOLOGY

## 2020-12-08 PROCEDURE — 370N000002 HC ANESTHESIA TECHNICAL FEE, EACH ADDTL 15 MIN: Performed by: OBSTETRICS & GYNECOLOGY

## 2020-12-08 PROCEDURE — 250N000011 HC RX IP 250 OP 636: Performed by: OBSTETRICS & GYNECOLOGY

## 2020-12-08 PROCEDURE — 999N000000 HC CHARGE NOT FOUND: Performed by: OBSTETRICS & GYNECOLOGY

## 2020-12-08 PROCEDURE — 250N000013 HC RX MED GY IP 250 OP 250 PS 637: Performed by: OBSTETRICS & GYNECOLOGY

## 2020-12-08 PROCEDURE — 272N000001 HC OR GENERAL SUPPLY STERILE: Performed by: OBSTETRICS & GYNECOLOGY

## 2020-12-08 PROCEDURE — 250N000011 HC RX IP 250 OP 636: Performed by: ANESTHESIOLOGY

## 2020-12-08 PROCEDURE — 88307 TISSUE EXAM BY PATHOLOGIST: CPT | Mod: 26 | Performed by: PATHOLOGY

## 2020-12-08 PROCEDURE — 36415 COLL VENOUS BLD VENIPUNCTURE: CPT | Performed by: OBSTETRICS & GYNECOLOGY

## 2020-12-08 PROCEDURE — 58571 TLH W/T/O 250 G OR LESS: CPT | Mod: 80 | Performed by: OBSTETRICS & GYNECOLOGY

## 2020-12-08 PROCEDURE — 258N000001 HC RX 258: Performed by: OBSTETRICS & GYNECOLOGY

## 2020-12-08 PROCEDURE — 258N000003 HC RX IP 258 OP 636

## 2020-12-08 PROCEDURE — 360N000026 HC SURGERY LEVEL 4 1ST 30 MIN: Performed by: OBSTETRICS & GYNECOLOGY

## 2020-12-08 PROCEDURE — 86900 BLOOD TYPING SEROLOGIC ABO: CPT | Performed by: OBSTETRICS & GYNECOLOGY

## 2020-12-08 PROCEDURE — 86901 BLOOD TYPING SEROLOGIC RH(D): CPT | Performed by: OBSTETRICS & GYNECOLOGY

## 2020-12-08 PROCEDURE — 58571 TLH W/T/O 250 G OR LESS: CPT | Performed by: OBSTETRICS & GYNECOLOGY

## 2020-12-08 PROCEDURE — 370N000001 HC ANESTHESIA TECHNICAL FEE, 1ST 30 MIN: Performed by: OBSTETRICS & GYNECOLOGY

## 2020-12-08 PROCEDURE — 250N000009 HC RX 250: Performed by: ANESTHESIOLOGY

## 2020-12-08 PROCEDURE — 360N000027 HC SURGERY LEVEL 4 EA 15 ADDTL MIN: Performed by: OBSTETRICS & GYNECOLOGY

## 2020-12-08 PROCEDURE — 84702 CHORIONIC GONADOTROPIN TEST: CPT | Performed by: OBSTETRICS & GYNECOLOGY

## 2020-12-08 PROCEDURE — 250N000003 HC SEVOFLURANE, EA 15 MIN: Performed by: OBSTETRICS & GYNECOLOGY

## 2020-12-08 PROCEDURE — 86850 RBC ANTIBODY SCREEN: CPT | Performed by: OBSTETRICS & GYNECOLOGY

## 2020-12-08 PROCEDURE — 761N000001 HC RECOVERY PHASE 1 LEVEL 1 FIRST HR: Performed by: OBSTETRICS & GYNECOLOGY

## 2020-12-08 PROCEDURE — 258N000003 HC RX IP 258 OP 636: Performed by: ANESTHESIOLOGY

## 2020-12-08 PROCEDURE — 250N000011 HC RX IP 250 OP 636

## 2020-12-08 PROCEDURE — 88307 TISSUE EXAM BY PATHOLOGIST: CPT | Mod: TC | Performed by: OBSTETRICS & GYNECOLOGY

## 2020-12-08 PROCEDURE — 761N000007 HC RECOVERY PHASE 2 EACH 15 MINS: Performed by: OBSTETRICS & GYNECOLOGY

## 2020-12-08 PROCEDURE — 250N000009 HC RX 250

## 2020-12-08 PROCEDURE — 761N000002 HC RECOVERY PHASE 1 LEVEL 1 EA ADDTL HR: Performed by: OBSTETRICS & GYNECOLOGY

## 2020-12-08 PROCEDURE — 85027 COMPLETE CBC AUTOMATED: CPT | Performed by: OBSTETRICS & GYNECOLOGY

## 2020-12-08 RX ORDER — ACETAMINOPHEN 325 MG/1
975 TABLET ORAL ONCE
Status: COMPLETED | OUTPATIENT
Start: 2020-12-08 | End: 2020-12-08

## 2020-12-08 RX ORDER — EPHEDRINE SULFATE 50 MG/ML
INJECTION, SOLUTION INTRAMUSCULAR; INTRAVENOUS; SUBCUTANEOUS PRN
Status: DISCONTINUED | OUTPATIENT
Start: 2020-12-08 | End: 2020-12-08

## 2020-12-08 RX ORDER — FENTANYL CITRATE 50 UG/ML
INJECTION, SOLUTION INTRAMUSCULAR; INTRAVENOUS PRN
Status: DISCONTINUED | OUTPATIENT
Start: 2020-12-08 | End: 2020-12-08

## 2020-12-08 RX ORDER — ACETAMINOPHEN 325 MG/1
975 TABLET ORAL EVERY 6 HOURS PRN
Status: DISCONTINUED | OUTPATIENT
Start: 2020-12-08 | End: 2020-12-08 | Stop reason: HOSPADM

## 2020-12-08 RX ORDER — PHENAZOPYRIDINE HYDROCHLORIDE 200 MG/1
200 TABLET, FILM COATED ORAL ONCE
Status: COMPLETED | OUTPATIENT
Start: 2020-12-08 | End: 2020-12-08

## 2020-12-08 RX ORDER — CLINDAMYCIN PHOSPHATE 900 MG/50ML
900 INJECTION, SOLUTION INTRAVENOUS SEE ADMIN INSTRUCTIONS
Status: DISCONTINUED | OUTPATIENT
Start: 2020-12-08 | End: 2020-12-08 | Stop reason: HOSPADM

## 2020-12-08 RX ORDER — HYDROMORPHONE HYDROCHLORIDE 1 MG/ML
.3-.5 INJECTION, SOLUTION INTRAMUSCULAR; INTRAVENOUS; SUBCUTANEOUS EVERY 10 MIN PRN
Status: DISCONTINUED | OUTPATIENT
Start: 2020-12-08 | End: 2020-12-08 | Stop reason: HOSPADM

## 2020-12-08 RX ORDER — NALOXONE HYDROCHLORIDE 0.4 MG/ML
0.2 INJECTION, SOLUTION INTRAMUSCULAR; INTRAVENOUS; SUBCUTANEOUS
Status: DISCONTINUED | OUTPATIENT
Start: 2020-12-08 | End: 2020-12-08 | Stop reason: HOSPADM

## 2020-12-08 RX ORDER — NALOXONE HYDROCHLORIDE 0.4 MG/ML
0.4 INJECTION, SOLUTION INTRAMUSCULAR; INTRAVENOUS; SUBCUTANEOUS
Status: DISCONTINUED | OUTPATIENT
Start: 2020-12-08 | End: 2020-12-08 | Stop reason: HOSPADM

## 2020-12-08 RX ORDER — CLINDAMYCIN PHOSPHATE 900 MG/50ML
900 INJECTION, SOLUTION INTRAVENOUS
Status: COMPLETED | OUTPATIENT
Start: 2020-12-08 | End: 2020-12-08

## 2020-12-08 RX ORDER — SODIUM CHLORIDE, SODIUM LACTATE, POTASSIUM CHLORIDE, CALCIUM CHLORIDE 600; 310; 30; 20 MG/100ML; MG/100ML; MG/100ML; MG/100ML
INJECTION, SOLUTION INTRAVENOUS CONTINUOUS
Status: DISCONTINUED | OUTPATIENT
Start: 2020-12-08 | End: 2020-12-08 | Stop reason: HOSPADM

## 2020-12-08 RX ORDER — GENTAMICIN SULFATE 100 MG/100ML
1.7 INJECTION, SOLUTION INTRAVENOUS SEE ADMIN INSTRUCTIONS
Status: DISCONTINUED | OUTPATIENT
Start: 2020-12-08 | End: 2020-12-08 | Stop reason: HOSPADM

## 2020-12-08 RX ORDER — OXYCODONE HYDROCHLORIDE 5 MG/1
5 TABLET ORAL
Status: COMPLETED | OUTPATIENT
Start: 2020-12-08 | End: 2020-12-08

## 2020-12-08 RX ORDER — KETOROLAC TROMETHAMINE 30 MG/ML
30 INJECTION, SOLUTION INTRAMUSCULAR; INTRAVENOUS ONCE
Status: COMPLETED | OUTPATIENT
Start: 2020-12-08 | End: 2020-12-08

## 2020-12-08 RX ORDER — DEXAMETHASONE SODIUM PHOSPHATE 4 MG/ML
INJECTION, SOLUTION INTRA-ARTICULAR; INTRALESIONAL; INTRAMUSCULAR; INTRAVENOUS; SOFT TISSUE PRN
Status: DISCONTINUED | OUTPATIENT
Start: 2020-12-08 | End: 2020-12-08

## 2020-12-08 RX ORDER — GENTAMICIN SULFATE 60 MG/50ML
2 INJECTION, SOLUTION INTRAVENOUS
Status: COMPLETED | OUTPATIENT
Start: 2020-12-08 | End: 2020-12-08

## 2020-12-08 RX ORDER — ONDANSETRON 2 MG/ML
4 INJECTION INTRAMUSCULAR; INTRAVENOUS EVERY 30 MIN PRN
Status: DISCONTINUED | OUTPATIENT
Start: 2020-12-08 | End: 2020-12-08 | Stop reason: HOSPADM

## 2020-12-08 RX ORDER — ALBUTEROL SULFATE 0.83 MG/ML
2.5 SOLUTION RESPIRATORY (INHALATION) EVERY 4 HOURS PRN
Status: DISCONTINUED | OUTPATIENT
Start: 2020-12-08 | End: 2020-12-08 | Stop reason: HOSPADM

## 2020-12-08 RX ORDER — BUPIVACAINE HYDROCHLORIDE 2.5 MG/ML
INJECTION, SOLUTION INFILTRATION; PERINEURAL PRN
Status: DISCONTINUED | OUTPATIENT
Start: 2020-12-08 | End: 2020-12-08 | Stop reason: HOSPADM

## 2020-12-08 RX ORDER — ONDANSETRON 4 MG/1
4 TABLET, ORALLY DISINTEGRATING ORAL EVERY 30 MIN PRN
Status: DISCONTINUED | OUTPATIENT
Start: 2020-12-08 | End: 2020-12-08 | Stop reason: HOSPADM

## 2020-12-08 RX ORDER — OXYCODONE HYDROCHLORIDE 5 MG/1
5-10 TABLET ORAL EVERY 4 HOURS PRN
Qty: 20 TABLET | Refills: 0 | Status: SHIPPED | OUTPATIENT
Start: 2020-12-08 | End: 2021-01-13

## 2020-12-08 RX ORDER — FENTANYL CITRATE 50 UG/ML
25-50 INJECTION, SOLUTION INTRAMUSCULAR; INTRAVENOUS
Status: DISCONTINUED | OUTPATIENT
Start: 2020-12-08 | End: 2020-12-08 | Stop reason: HOSPADM

## 2020-12-08 RX ORDER — MEPERIDINE HYDROCHLORIDE 25 MG/ML
12.5 INJECTION INTRAMUSCULAR; INTRAVENOUS; SUBCUTANEOUS
Status: DISCONTINUED | OUTPATIENT
Start: 2020-12-08 | End: 2020-12-08 | Stop reason: HOSPADM

## 2020-12-08 RX ORDER — PROPOFOL 10 MG/ML
INJECTION, EMULSION INTRAVENOUS PRN
Status: DISCONTINUED | OUTPATIENT
Start: 2020-12-08 | End: 2020-12-08

## 2020-12-08 RX ORDER — METOPROLOL TARTRATE 1 MG/ML
1-2 INJECTION, SOLUTION INTRAVENOUS EVERY 5 MIN PRN
Status: DISCONTINUED | OUTPATIENT
Start: 2020-12-08 | End: 2020-12-08 | Stop reason: HOSPADM

## 2020-12-08 RX ORDER — PROPOFOL 10 MG/ML
INJECTION, EMULSION INTRAVENOUS CONTINUOUS PRN
Status: DISCONTINUED | OUTPATIENT
Start: 2020-12-08 | End: 2020-12-08

## 2020-12-08 RX ORDER — ONDANSETRON 2 MG/ML
INJECTION INTRAMUSCULAR; INTRAVENOUS PRN
Status: DISCONTINUED | OUTPATIENT
Start: 2020-12-08 | End: 2020-12-08

## 2020-12-08 RX ORDER — AMOXICILLIN 250 MG
1-2 CAPSULE ORAL 2 TIMES DAILY
Qty: 30 TABLET | Refills: 0 | Status: SHIPPED | OUTPATIENT
Start: 2020-12-08 | End: 2021-01-13

## 2020-12-08 RX ORDER — HYDRALAZINE HYDROCHLORIDE 20 MG/ML
2.5-5 INJECTION INTRAMUSCULAR; INTRAVENOUS EVERY 10 MIN PRN
Status: DISCONTINUED | OUTPATIENT
Start: 2020-12-08 | End: 2020-12-08 | Stop reason: HOSPADM

## 2020-12-08 RX ORDER — LIDOCAINE HYDROCHLORIDE 20 MG/ML
INJECTION, SOLUTION INFILTRATION; PERINEURAL PRN
Status: DISCONTINUED | OUTPATIENT
Start: 2020-12-08 | End: 2020-12-08

## 2020-12-08 RX ORDER — DEXAMETHASONE SODIUM PHOSPHATE 4 MG/ML
4 INJECTION, SOLUTION INTRA-ARTICULAR; INTRALESIONAL; INTRAMUSCULAR; INTRAVENOUS; SOFT TISSUE EVERY 10 MIN PRN
Status: DISCONTINUED | OUTPATIENT
Start: 2020-12-08 | End: 2020-12-08 | Stop reason: HOSPADM

## 2020-12-08 RX ORDER — OXYCODONE HCL 5 MG/5 ML
5 SOLUTION, ORAL ORAL EVERY 4 HOURS PRN
Status: DISCONTINUED | OUTPATIENT
Start: 2020-12-08 | End: 2020-12-08 | Stop reason: HOSPADM

## 2020-12-08 RX ORDER — MAGNESIUM HYDROXIDE 1200 MG/15ML
LIQUID ORAL PRN
Status: DISCONTINUED | OUTPATIENT
Start: 2020-12-08 | End: 2020-12-08 | Stop reason: HOSPADM

## 2020-12-08 RX ORDER — SODIUM CHLORIDE, SODIUM LACTATE, POTASSIUM CHLORIDE, CALCIUM CHLORIDE 600; 310; 30; 20 MG/100ML; MG/100ML; MG/100ML; MG/100ML
INJECTION, SOLUTION INTRAVENOUS CONTINUOUS PRN
Status: DISCONTINUED | OUTPATIENT
Start: 2020-12-08 | End: 2020-12-08

## 2020-12-08 RX ADMIN — PHENAZOPYRIDINE HYDROCHLORIDE 200 MG: 200 TABLET ORAL at 07:13

## 2020-12-08 RX ADMIN — PROPOFOL 200 MG: 10 INJECTION, EMULSION INTRAVENOUS at 08:06

## 2020-12-08 RX ADMIN — Medication 5 MG: at 10:54

## 2020-12-08 RX ADMIN — ONDANSETRON 4 MG: 2 INJECTION INTRAMUSCULAR; INTRAVENOUS at 13:06

## 2020-12-08 RX ADMIN — ACETAMINOPHEN 975 MG: 325 TABLET, FILM COATED ORAL at 13:38

## 2020-12-08 RX ADMIN — OXYCODONE HYDROCHLORIDE 5 MG: 5 TABLET ORAL at 13:30

## 2020-12-08 RX ADMIN — ROCURONIUM BROMIDE 50 MG: 10 INJECTION INTRAVENOUS at 08:06

## 2020-12-08 RX ADMIN — MIDAZOLAM 2 MG: 1 INJECTION INTRAMUSCULAR; INTRAVENOUS at 08:01

## 2020-12-08 RX ADMIN — FENTANYL CITRATE 100 MCG: 50 INJECTION, SOLUTION INTRAMUSCULAR; INTRAVENOUS at 08:06

## 2020-12-08 RX ADMIN — HYDROMORPHONE HYDROCHLORIDE 0.3 MG: 1 INJECTION, SOLUTION INTRAMUSCULAR; INTRAVENOUS; SUBCUTANEOUS at 14:38

## 2020-12-08 RX ADMIN — ONDANSETRON 4 MG: 2 INJECTION INTRAMUSCULAR; INTRAVENOUS at 10:48

## 2020-12-08 RX ADMIN — Medication 5 MG: at 09:55

## 2020-12-08 RX ADMIN — DEXAMETHASONE SODIUM PHOSPHATE 4 MG: 4 INJECTION, SOLUTION INTRA-ARTICULAR; INTRALESIONAL; INTRAMUSCULAR; INTRAVENOUS; SOFT TISSUE at 08:27

## 2020-12-08 RX ADMIN — Medication 5 MG: at 10:56

## 2020-12-08 RX ADMIN — PROPOFOL 30 MCG/KG/MIN: 10 INJECTION, EMULSION INTRAVENOUS at 08:13

## 2020-12-08 RX ADMIN — SODIUM CHLORIDE, POTASSIUM CHLORIDE, SODIUM LACTATE AND CALCIUM CHLORIDE: 600; 310; 30; 20 INJECTION, SOLUTION INTRAVENOUS at 07:53

## 2020-12-08 RX ADMIN — HYDROMORPHONE HYDROCHLORIDE 0.25 MG: 1 INJECTION, SOLUTION INTRAMUSCULAR; INTRAVENOUS; SUBCUTANEOUS at 10:50

## 2020-12-08 RX ADMIN — FENTANYL CITRATE 50 MCG: 0.05 INJECTION, SOLUTION INTRAMUSCULAR; INTRAVENOUS at 12:54

## 2020-12-08 RX ADMIN — LIDOCAINE HYDROCHLORIDE 0.3 ML: 10 INJECTION, SOLUTION EPIDURAL; INFILTRATION; INTRACAUDAL; PERINEURAL at 07:58

## 2020-12-08 RX ADMIN — SUGAMMADEX 200 MG: 100 INJECTION, SOLUTION INTRAVENOUS at 11:11

## 2020-12-08 RX ADMIN — FENTANYL CITRATE 50 MCG: 0.05 INJECTION, SOLUTION INTRAMUSCULAR; INTRAVENOUS at 12:09

## 2020-12-08 RX ADMIN — HYDROMORPHONE HYDROCHLORIDE 0.25 MG: 1 INJECTION, SOLUTION INTRAMUSCULAR; INTRAVENOUS; SUBCUTANEOUS at 09:42

## 2020-12-08 RX ADMIN — SODIUM CHLORIDE, POTASSIUM CHLORIDE, SODIUM LACTATE AND CALCIUM CHLORIDE: 600; 310; 30; 20 INJECTION, SOLUTION INTRAVENOUS at 08:14

## 2020-12-08 RX ADMIN — ROCURONIUM BROMIDE 10 MG: 10 INJECTION INTRAVENOUS at 09:23

## 2020-12-08 RX ADMIN — PHENYLEPHRINE HYDROCHLORIDE 100 MCG: 10 INJECTION INTRAVENOUS at 08:10

## 2020-12-08 RX ADMIN — ACETAMINOPHEN 975 MG: 325 TABLET, FILM COATED ORAL at 07:13

## 2020-12-08 RX ADMIN — HYDROMORPHONE HYDROCHLORIDE 0.5 MG: 1 INJECTION, SOLUTION INTRAMUSCULAR; INTRAVENOUS; SUBCUTANEOUS at 12:14

## 2020-12-08 RX ADMIN — KETOROLAC TROMETHAMINE 30 MG: 30 INJECTION, SOLUTION INTRAMUSCULAR at 14:35

## 2020-12-08 RX ADMIN — DEXMEDETOMIDINE HYDROCHLORIDE 0.5 MCG/KG/HR: 100 INJECTION, SOLUTION INTRAVENOUS at 08:12

## 2020-12-08 RX ADMIN — GENTAMICIN SULFATE 120 MG: 60 INJECTION, SOLUTION INTRAVENOUS at 08:24

## 2020-12-08 RX ADMIN — LIDOCAINE HYDROCHLORIDE 100 MG: 20 INJECTION, SOLUTION INFILTRATION; PERINEURAL at 08:06

## 2020-12-08 RX ADMIN — CLINDAMYCIN PHOSPHATE 900 MG: 900 INJECTION, SOLUTION INTRAVENOUS at 08:16

## 2020-12-08 ASSESSMENT — MIFFLIN-ST. JEOR: SCORE: 1287.02

## 2020-12-08 NOTE — OP NOTE
TLH/Bilateral Salpingectomy OPERATIVE REPORT     DATE OF PROCEDURE: 12/8/20  STAFF SURGEON: Teresa Longoria MD  PREOPERATIVE DIAGNOSIS:DUB on HRT, endometrial polyp, uterine fibroids, history of KENDY and recurrent HPV positive pap smears  POSTOPERATIVE DIAGNOSIS: Same   PROCEDURE: Total laparoscopic hysterectomy, bilateral salpingo-oopherectomy, and cystoscopy.  ASSISTANT SURGEON: Wilda Diallo DO. Necessary to have another physician assistant due to complexity of the case.  Assistant needed to help with retraction, adequate visualization and for added safety of the procedure  ANESTHESIA: General endotracheal anesthesia.  COMPLICATIONS: none  EBL: 50  SPECIMENS: uterus, cervix, and bilateral fallopian tubes and ovaries  FINDINGS:the uterus appeared normal. There was a 1cm peduncunlated anterior fibroid and then a posterior right subserosal fibroid that was 2-3cm. Ovaries normal but the right one was flush in the peritoneum overlying the IP as it came over the pelvic brim. On the left the ovary was more freely at the distal end of the left tube as it should be but there were some adhesions of omentum and bowel along the underside of the ovary and underside of the superior portion of the IP right as it crossed the brim. Taking the IP high on the left and slight SHARAN allowed for a clear plane. Attempted to use a PK look on top of the vcare to improve blood loss and clear planes on the posterior side of the uterus but unable to seat it appropriately so colpotomy done in normal fashion with monopolar hook. On cysto after the hyst there was normal bladder w/o defect and bilaterally patent UOs  INDICATIONS: 49 yo perimenopausal woman on HRT with a lot of DUB that was attributed to the HRT. Had several alterations in the hormones with changing doses and going from prometrium to provera. All of this would improve symptoms for a bit but then recur. Did an U/s to eval and found a 1.6cm polyp, a 2-3cm posterior fibroid. Also had  had long standing h.o KENDY I and HPV recurrently on her paps. Discussed HSC with polypectomy vs TLH with BSO or without BSO and the patient opted for definitive surgicall management with TLH/BSO.  She was counseled on the risks, benefits, and alternatives of the procedure, and informed consent was signed.     DESCRIPTION OF PROCEDURE:  The patient was taken to the operating room where general endotracheal anesthesia was induced without difficulty and found to be adequate. She was prepped and draped in the normal sterile fashion in dorsal lithotomy position. Weighted speculum was placed in the vagina to visualize the cervix, and a right-angle retractor was used anteriorly in the vagina.  The uterus was then sounded and no dilation was needed. The uterus was sounded to 8, and the large [email or web address]-care uterine manipulator was chosen. The pericervical tissues were injected with 10cc of 0.5% marcaine to improve postoperative pain control at the vaginal cuff. an 0 vicryl stay suture was placed at the 9 and 3 oclock positions. The intracervical portion was placed and the balloon inflated with 5cc of air. The stay sutures were fed through the openings in the sides of the cervical cup and then the cervical cup was advanced over the cervix and there was felt to be good hold on the perimeter of the cervix. Then the vaginal blue occluder was advanced and locked in to place. A Foleycatheter was placed for urinary drainage.      Attention was turned to the abdomen. 0.5% Marcaine was injected infraumbilically, and a 5 mm infraumbilical skin incision was made with the scalpel. Using a 5mm trocar, under direct visualization with the laparoscope the trocar was placed into the abdomen which was then insufflated with carbon dioxide gas. Patient was placed in trendelenburg position. Lateral ports were then placed; one port on the lower right side, 5 mm; and one in the lower left, also 5mm and this was after injecting the skin and peritoneum with  marcaine as well. No underlying damage to abdominal or pelvic organs was noted after placement of the trocars. The abdomen and pelvis were surveyed,  anatomy noted as above and pictures were taken to document the anatomy. Scissors on monopolar cautery were then used to take down some of the adhesions of the bowel to the left IP ligament and what was tethering the ovary. Once there was a clear enough plane to cross clamp the IP on the left, The Ligasure was then used to clamp, coagulate it. The ligasure was then used to cut along the mesosalpinx on the left to the level of the cornua and then along the round ligament on the right side. Anterior and posterior leaves of broad ligaments were dissected, and bladder flap was developed anteriorly.   Posterior leaf of broad ligament was taken down, and the uterine artery was dissected out on this side. It was clamped, coagulated, and cut again with the ligasure.  Attention was then turned to the right  side, and this procedure was performed in a similar manner. On the right the ovary was flush in the peritoneum overlying the IP ligament. With elevation of the ovary a clear plane in the peritoneum was found and this was clamped and coagulated and cut with the ligasure until the ovary and tube were free and clear and revealing a normal IP on that side. The ligasure was then used to coagulate and cut the right IP and this was followed down along the mesosalpinx, then the  round ligament; developing the anterior and posterior leaves of the broad ligament and the bladder flap; skeletonizing the uterine artery; clamping, coagulating, and cutting the uterine artery; The anterior colpotomy was made with monopolar L-hook. At this point we attempted to use the PK loop as mentioned above. However after several attempts we were not able to seat the loop exactly along the landmarks that could be palpated as the lip in the cervical collar. So this was terminated as an option and we  "simply used the monopolar L-hook  circumferentially around the cervix for both anterior and posterior colpotomy assisted by visualization of the green cervical cup with groove. Once this was completed, the specimen which included the uterus, cervix, and bilateral fallopian tubes/ovaries were removed; and a glove stuffed with two small laparotomy sponges was placed in the vagina in order to maintain pneumoperitoneum. An additional port was then placed in the right upper quadrant after injection of 0.5% marcaine in the subcutaneous and the peritoneum. Scalpel used to make the skin incision and the 11mm port placed. Next using an 0 v-lock barbed 6\" suture on the endostitch the vaginal cuff was closed from the right corner through to the left with excellent hemostasis noted. The entire pelvis was irrigated and suctioned and all pedicles were hemostatic and finally surgicel powder was placed.     Attention was then turned to cystoscopy. The zavala catheter was removed and a 30-degree cystoscope was used. The cystoscope was passed through the urethra into the bladder under direct visualization, and bilateral ureteral efflux of very slightly pyridium dyed urine was noted. No damage to the rest of the bladder wall was noted. The cystoscope was removed.      All instruments and glove with lap sponges were removed from the vagina. Attention was turned back to the abdomen. The 11mm trocar was removed and using a kiana colleen and an 0 vicryl the fascia at this site was closed with one stitch. All three ports were removed and the carbon dioxide gas was allowed to escape the abdomen.  The skin was then closed with 4-0 monocryl on all incisions, and Steri-Strips were applied. All counts were correct x2. The patient was cleaned off, returned to the supine position, awoken from anesthesia, and taken to Recovery in a good condition.    Drains:none-zavala removed at completion of surgery    Disposition-anticipate d/c to home later " today if able to void and pain controlled with oral pain meds. Can do overnight stay in obs if felt to be needed by patient/pacu RNs     Teresa Longoria MD

## 2020-12-08 NOTE — OR NURSING
Pt still having considerable pain in abd area. Pt not able to discharge at this time.  Call to surgeon made.  WIll monitor.

## 2020-12-08 NOTE — ANESTHESIA PROCEDURE NOTES
Airway   Date/Time: 12/8/2020 8:09 AM   Patient location during procedure: OR    Staff -   Anesthesiologist:  Shelby Davis MD  CRNA: Gertrude Stephens APRN CRNA  Other Anesthesia Staff: Pawan Deras  Performed By: CRNA, anesthesiologist and SRNA    Consent for Airway   Urgency: elective    Indications and Patient Condition  Indications for airway management: tavares-procedural  Induction type:intravenousMask difficulty assessment: 1 - vent by mask    Final Airway Details  Final airway type: endotracheal airway  Successful airway:ETT - single  Endotracheal Airway Details   ETT size (mm): 7.0  Cuffed: yes  Cuff volume (mL): 8  Successful intubation technique: direct laryngoscopy  Grade View of Cords: 3  Adjucts: stylet  Measured from: lips  Secured at (cm): 22  Secured with: pink tape  Bite block used: None    Post intubation assessment   Placement verified by: capnometry, equal breath sounds and chest rise   Number of attempts at approach: 2  Secured with:pink tape  Ease of procedure: easy  Dentition: Intact and Unchanged

## 2020-12-08 NOTE — ANESTHESIA PREPROCEDURE EVALUATION
Anesthesia Pre-Procedure Evaluation    Patient: Shelby Asencio   MRN: [id number] : 1970          Preoperative Diagnosis: DUB (dysfunctional uterine bleeding) [N93.8]  Endometrial polyp [N84.0]  Intramural leiomyoma of uterus [D25.1]  Cervical high risk human papillomavirus (HPV) DNA test positive [R87.810]    Procedure(s):  TOTAL LAPAROSCOPIC HYSTERECTOMY, BILATERAL SALPINGO OOPHORECTOMY,  CYSTOSCOPY    Past Medical History:   Diagnosis Date     Abnormal Pap smear of cervix     +HPV s/p colp x 2     AS (ankylosing spondylitis) (H) 6/10/2013    Dr. Sloan     Cervical high risk HPV (human papillomavirus) test positive 2019    12/17/10 LSIL pap >> 11 Colpo- KENDY 1 11 NIL pap 12 LSIL pap/+ HR HPV >> 12 Colpo- cervicitis, KA 13 NIL pap, + HR HPV 14 NIL pap, Neg HPV 19 NIL pap, + HR HPV 16. Plan: colpo      KENDY I (cervical intraepithelial neoplasia I)     KENDY I at 11 o'clock     Cluster headache      Cold sore 3/2009     Condyloma acuminata 10/6/10 11/24/10, 12/10    Excision of larger condyloma and TCA with Von in oct.  TCA of perineal condyloma with Deon in nov. and again with Von 12/10     DUB (dysfunctional uterine bleeding)      GERD (gastroesophageal reflux disease)      Herpes labialis     HSV 2, was neg . then positive at the hospital  and then on repeat here 2 weeks after that 13 was neg again. as well as 2013 still neg.     Hx of abnormal cervical Pap smear      Hypothyroidism      Major depression     aakash lashanda following celexa     OA (osteoarthritis)     bilat great toes, TCO     Post-menopause on HRT (hormone replacement therapy) 2016     Sjogren's syndrome (H) 2014    with dry eyes and mouth     Symptoms, such as flushing, sleeplessness, headache, lack of concentration, associated with the menopause 2016     TMJ (temporomandibular joint syndrome)      Past Surgical History:   Procedure Laterality Date      "COLONOSCOPY N/A 12/9/2019    Procedure: COLONOSCOPY;  Surgeon: Gem Elder MD;  Location:  GI     HC HYSTEROS W PERMANENT FALLOPAIN IMPLANT  8/30/2010    By Dr. Longoria     HC HYSTEROSALPINGOGRAM  12/28/10    post essure bilateral tubal occlusion     MAMMOPLASTY AUGMENTATION  2008     ROTATOR CUFF REPAIR RT/LT  4/12       Anesthesia Evaluation     . Pt has had prior anesthetic.     No history of anesthetic complications          ROS/MED HX    ENT/Pulmonary:      (-) sleep apnea   Neurologic:       Cardiovascular:         METS/Exercise Tolerance:     Hematologic:         Musculoskeletal:         GI/Hepatic:     (+) GERD       Renal/Genitourinary: Comment: DUB        Endo:     (+) thyroid problem hypothyroidism, .      Psychiatric:         Infectious Disease:         Malignancy:         Other:                                 Lab Results   Component Value Date    WBC 5.9 12/08/2020    HGB 13.3 12/08/2020    HCT 39.7 12/08/2020     12/08/2020    SED 4 02/28/2013     10/23/2020    POTASSIUM 4.1 10/23/2020    CHLORIDE 108 10/23/2020    CO2 28 10/23/2020    BUN 12 10/23/2020    CR 0.88 10/23/2020    GLC 78 10/23/2020    AICHA 9.5 10/23/2020    ALBUMIN 4.0 10/23/2020    PROTTOTAL 7.2 10/23/2020    ALT 36 10/23/2020    AST 22 10/23/2020    ALKPHOS 82 10/23/2020    BILITOTAL 0.4 10/23/2020    TSH 0.97 10/23/2020    HCG Negative 03/27/2019       Preop Vitals  BP Readings from Last 3 Encounters:   12/08/20 117/81   11/04/20 126/68   03/04/20 120/68    Pulse Readings from Last 3 Encounters:   12/08/20 74   03/04/20 84   12/09/19 69      Resp Readings from Last 3 Encounters:   12/08/20 18   12/09/19 13   04/29/16 16    SpO2 Readings from Last 3 Encounters:   12/08/20 97%   12/09/19 99%   10/30/19 97%      Temp Readings from Last 1 Encounters:   12/08/20 37.1  C (98.7  F) (Temporal)    Ht Readings from Last 1 Encounters:   12/08/20 1.626 m (5' 4\")      Wt Readings from Last 1 Encounters:   12/08/20 69.2 kg " "(152 lb 9 oz)    Estimated body mass index is 26.19 kg/m  as calculated from the following:    Height as of this encounter: 1.626 m (5' 4\").    Weight as of this encounter: 69.2 kg (152 lb 9 oz).       Anesthesia Plan      History & Physical Review  History and physical reviewed and following examination; no interval change.    ASA Status:  2 .    NPO Status:  > 8 hours    Plan for General with Intravenous induction. Maintenance will be Balanced.    PONV prophylaxis:  Ondansetron (or other 5HT-3) and Dexamethasone or Solumedrol  Additional equipment: 2nd IV Propofol and precedex gtt        Postoperative Care  Postoperative pain management:  IV analgesics and Oral pain medications.      Consents  Anesthetic plan, risks, benefits and alternatives discussed with:  Patient..                 Shelby Davis MD, MD  "

## 2020-12-08 NOTE — ANESTHESIA CARE TRANSFER NOTE
Patient: Shelby Asencio    Procedure(s):  TOTAL LAPAROSCOPIC HYSTERECTOMY, BILATERAL SALPINGO OOPHORECTOMY,  CYSTOSCOPY    Diagnosis: DUB (dysfunctional uterine bleeding) [N93.8]  Endometrial polyp [N84.0]  Intramural leiomyoma of uterus [D25.1]  Cervical high risk human papillomavirus (HPV) DNA test positive [R87.810]  Diagnosis Additional Information: No value filed.    Anesthesia Type:   General     Note:  Airway :Face Mask  Patient transferred to:PACU  Comments: Transferred to PACU, spontaneous respirations, 10L oxygen via facemask.  All monitors and alarms on and functioning, VSS.  Patient awake, comfortable.  Report to PACU RN.Handoff Report: Identifed the Patient, Identified the Reponsible Provider, Reviewed the pertinent medical history, Discussed the surgical course, Reviewed Intra-OP anesthesia mangement and issues during anesthesia, Set expectations for post-procedure period and Allowed opportunity for questions and acknowledgement of understanding      Vitals: (Last set prior to Anesthesia Care Transfer)    CRNA VITALS  12/8/2020 1053 - 12/8/2020 1129      12/8/2020             Pulse:  72    Ht Rate:  72    SpO2:  100 %    Resp Rate (set):  10                Electronically Signed By: URSULA Larsen CRNA  December 8, 2020  11:29 AM

## 2020-12-08 NOTE — BRIEF OP NOTE
[facility]    Brief Operative Note    Pre-operative diagnosis: DUB (dysfunctional uterine bleeding) [N93.8]  Endometrial polyp [N84.0]  Intramural leiomyoma of uterus [D25.1]  Cervical high risk human papillomavirus (HPV) DNA test positive [R87.810]  Post-operative diagnosis Same as pre-operative diagnosis    Procedure: Procedure(s):  TOTAL LAPAROSCOPIC HYSTERECTOMY, BILATERAL SALPINGO OOPHORECTOMY,  CYSTOSCOPY  Surgeon: Surgeon(s) and Role:  Panel 1:     * Teresa Longoria MD - Primary  Panel 2:     * Teresa Longoria MD - Primary     * MastersWilda DO - Assisting  Anesthesia: General   Estimated blood loss: Less than 50 ml  Drains: None  Specimens:   ID Type Source Tests Collected by Time Destination   A : Uterus, Cervix, Bilateral Fallopian Tubes and Ovaries Tissue Uterus with Bilateral Ovaries and Fallopian Tubes SURGICAL PATHOLOGY EXAM Teresa Longoria MD 12/8/2020 10:18 AM      Findings:   normal slightly enlarge uterus with a subserosal 2-3 cm posterior right fibroid and a smal 1cm pedunculated anterior midline fibroid. normal tubes. normal ovaries bilaterally but the one on the right was laying along the peritoneum overlying the IP flush with the periitoneum. the one on the right was more free and anatomically correctly positiioned but there were some adhesions to the omentum and bowel on the posterior side and along the superior length of the IP as it came over the brim needing some SHARAN. normal cysto after hyst with bilaterally patent UOs. the right ureter well seen during L/s and the left not well seen. .  Complications: None.

## 2020-12-08 NOTE — DISCHARGE INSTRUCTIONS
Discharge Instructions for Laparoscopic or Davinci Hysterectomy    Incisional Care  A small amount of drainage from your incisions is normal. You may wear Band Aids until the drainage stops. The day after surgery, if your incisions are dry, remove the Band Aids. Leave the Steri-Strips (small pieces of tape) in place. Let them fall off on their own, or gently remove them after 7 days.  Once your have removed your Band Aids, you may shower as usual. Wash your incisions with mild soap and water. Pat dry.  Don't use oils, powders, or lotions on your incisions.  General Care  Take your medications exactly as directed by your doctor.    You may have vaginal bleeding that can last for several weeks. Use pads only. Don't put anything in your vagina (tampons, douches or have sexual intercourse) until your doctor says it's safe to do so.  Avoid constipation.  Eat fruits, vegetables, and whole grains.  Drink 6-8 glasses of water a day, unless told to do otherwise.  Use a laxative or a mild stool softener if your doctor says it's okay.  Activity  Don't drive while you are still taking narcotic pain medications.  Don t do strenuous activities until the doctor says it's okay.  Walk as often as you feel able.    Pain  Your incisions may be tender or sore. You may also have pain in your upper back or shoulders. This is from the gas used to enlarge your abdomen to allow your doctor to see inside your pelvis and perform the procedure. This pain usually goes away in a day or two.   When to Call Your Doctor  Call your doctor right away if you have any of the following:  Fever above 100.4 F (38 C) or chills  Vaginal bleeding that soaks more than one sanitary pad per hour  A foul smelling discharge from the vagina  Difficulty urinating  Severe pain   Redness, swelling, or drainage at your incision sites  Follow-Up  Make a follow-up appointment as directed by your surgeon.  Same Day Surgery Discharge Instructions for  Sedation and General  Anesthesia       It's not unusual to feel dizzy, light-headed or faint for up to 24 hours after surgery or while taking pain medication.  If you have these symptoms: sit for a few minutes before standing and have someone assist you when you get up to walk or use the bathroom.      You should rest and relax for the next 24 hours. We recommend you make arrangements to have an adult stay with you for at least 24 hours after your discharge.  Avoid hazardous and strenuous activity.      DO NOT DRIVE any vehicle or operate mechanical equipment for 24 hours following the end of your surgery.  Even though you may feel normal, your reactions may be affected by the medication you have received.      Do not drink alcoholic beverages for 24 hours following surgery.       Slowly progress to your regular diet as you feel able. It's not unusual to feel nauseated and/or vomit after receiving anesthesia.  If you develop these symptoms, drink clear liquids (apple juice, ginger ale, broth, 7-up, etc. ) until you feel better.  If your nausea and vomiting persists for 24 hours, please notify your surgeon.        All narcotic pain medications, along with inactivity and anesthesia, can cause constipation. Drinking plenty of liquids and increasing fiber intake will help.      For any questions of a medical nature, call your surgeon.      Do not make important decisions for 24 hours.      If you had general anesthesia, you may have a sore throat for a couple of days related to the breathing tube used during surgery.  You may use Cepacol lozenges to help with this discomfort.  If it worsens or if you develop a fever, contact your surgeon.       If you feel your pain is not well managed with the pain medications prescribed by your surgeon, please contact your surgeon's office to let them know so they can address your concerns.       CoVid 19 Information    We want to give you information regarding Covid. Please consult your primary care  provider with any questions you might have.     Patient who have symptoms (cough, fever, or shortness of breath), need to isolate for 7 days from when symptoms started OR 72 hours after fever resolves (without fever reducing medications) AND improvement of respiratory symptoms (whichever is longer).      Isolate yourself at home (in own room/own bathroom if possible)    Do Not allow any visitors    Do Not go to work or school    Do Not go to [employer],  centers, shopping, or other public places.    Do Not shake hands.    Avoid close and intimate contact with others (hugging, kissing).    Follow CDC recommendations for household cleaning of frequently touched services.     After the initial 7 days, continue to isolate yourself from household members as much as possible. To continue decrease the risk of community spread and exposure, you and any members of your household should limit activities in public for 14 days after starting home isolation.     You can reference the following CDC link for helpful home isolation/care tips:  https://www.cdc.gov/coronavirus/2019-ncov/downloads/10Things.pdf    Protect Others:    Cover Your Mouth and Nose with a mask, disposable tissue or wash cloth to avoid spreading germs to others.    Wash your hands and face frequently with soap and water    Call Your Primary Doctor If: Breathing difficulty develops or you become worse.    For more information about COVID19 and options for caring for yourself at home, please visit the CDC website at https://www.cdc.gov/coronavirus/2019-ncov/about/steps-when-sick.html  For more options for care at [facility], please visit our website at [email or web address]    Today you received Toradol, an antiinflammatory medication similar to Ibuprofen.  You should not take other antiinflammatory medication, such as Ibuprofen, Motrin, Advil, Aleve, Naprosyn, etc until 8:30 pm     **If you have questions or concerns  about your procedure,  call Dr. Longoria at [phone]**

## 2020-12-08 NOTE — OR NURSING
Paged Dr Longoria regarding patient's pain level 5/10 without relief after pain medication. Dr ordered Toradol 30 mg and  Dilaudid 0.3 mg. Wait one hour if there is not improvement  patient will be admitted tot [facility]

## 2020-12-08 NOTE — OR NURSING
Discharge instruction given to daughter, no questions and concerns . Pt discharge in stable condition by wheelchair.

## 2020-12-08 NOTE — ANESTHESIA POSTPROCEDURE EVALUATION
Patient: Shelby Asencio    Procedure(s):  TOTAL LAPAROSCOPIC HYSTERECTOMY, BILATERAL SALPINGO OOPHORECTOMY,  CYSTOSCOPY    Diagnosis:DUB (dysfunctional uterine bleeding) [N93.8]  Endometrial polyp [N84.0]  Intramural leiomyoma of uterus [D25.1]  Cervical high risk human papillomavirus (HPV) DNA test positive [R87.810]  Diagnosis Additional Information: No value filed.    Anesthesia Type:  General    Note:  Anesthesia Post Evaluation    Patient location during evaluation: PACU  Patient participation: Able to fully participate in evaluation  Level of consciousness: awake  Pain management: adequate  Airway patency: patent  Cardiovascular status: acceptable  Respiratory status: acceptable  Hydration status: acceptable  PONV: none     Anesthetic complications: None          Last vitals:  Vitals:    12/08/20 1350 12/08/20 1400 12/08/20 1410   BP: 97/63 112/78 104/66   Pulse: 71 71 69   Resp: 14 18 17   Temp:      SpO2: 95% 98% 96%         Electronically Signed By: Shelby Davis MD, MD  December 8, 2020  2:29 PM

## 2020-12-08 NOTE — H&P
H&P updated today to clinic visit 11/4/2020.  No new changes in health history, medications, allergies, or symptoms.    Covid test result neg as of yesterday  /81, HR 74, RR 18, temp 98.7  CV: rrr, no murmurs  Lungs Cta bilaterally    Patient is medically cleared to proceed with surgery    Had planned with patient to do a same day discharge today as long as she was up to it rather than doing observation stay as per usual. Partly due to shared room status in obs care now as well as moratorium on scheduling surgeries that needed a stay.  Patient was agreeable to this plan however at some point this was changed to an in patient stay.    Discussed r/b/a of the surgery with the patient but also plan to attempt d/c later today if feeling up for it. If she is not can certainly plan to do o/n observation stay and d/c in the AM and patient Is agreeable to this

## 2020-12-10 LAB — COPATH REPORT: NORMAL

## 2020-12-21 ENCOUNTER — MYC MEDICAL ADVICE (OUTPATIENT)
Dept: OBGYN | Facility: CLINIC | Age: 50
End: 2020-12-21

## 2021-01-11 ENCOUNTER — TRANSFERRED RECORDS (OUTPATIENT)
Dept: HEALTH INFORMATION MANAGEMENT | Facility: CLINIC | Age: 51
End: 2021-01-11

## 2021-01-11 LAB
ALT SERPL-CCNC: 21 IU/L (ref 5–35)
AST SERPL-CCNC: 29 U/L (ref 5–34)
CREAT SERPL-MCNC: 0.86 MG/DL (ref 0.5–1.3)
GFR SERPL CREATININE-BSD FRML MDRD: 73.6 ML/MIN/1.73M2

## 2021-01-13 ENCOUNTER — OFFICE VISIT (OUTPATIENT)
Dept: OBGYN | Facility: CLINIC | Age: 51
End: 2021-01-13
Payer: COMMERCIAL

## 2021-01-13 VITALS
SYSTOLIC BLOOD PRESSURE: 102 MMHG | BODY MASS INDEX: 26.46 KG/M2 | DIASTOLIC BLOOD PRESSURE: 68 MMHG | HEIGHT: 64 IN | WEIGHT: 155 LBS

## 2021-01-13 DIAGNOSIS — Z09 POSTOP CHECK: Primary | ICD-10-CM

## 2021-01-13 DIAGNOSIS — Z90.710 S/P LAPAROSCOPIC HYSTERECTOMY: ICD-10-CM

## 2021-01-13 PROCEDURE — 99024 POSTOP FOLLOW-UP VISIT: CPT | Performed by: OBSTETRICS & GYNECOLOGY

## 2021-01-13 RX ORDER — NICOTINE 14MG/24HR
PATCH, TRANSDERMAL 24 HOURS TRANSDERMAL
COMMUNITY

## 2021-01-13 ASSESSMENT — MIFFLIN-ST. JEOR: SCORE: 1298.08

## 2021-01-13 NOTE — PROGRESS NOTES
SUBJECTIVE:                                                   Shelby Asencio is a 50 year old female who presents to clinic today for the following health issue(s):  Patient presents with:  Post-op Visit:  total hysterectomy, bilateral salpingo oophorectomy       HPI:  Patient had a TLH/BSO on . Had what was presumed to be a transmural fibroid that wasn't in the cavity but possibly also a polyp. Had been on HRT for a couple of years and constantly having DUB thought to be HRT related until U/S was done and this was found  Also had had abnormal paps and KENDY I in recent years  Discussed doing HSC with polypectomy only but patient opted for definitive surgical intervention with hyst and bso since already on HRT anyway  Found that she actually had a partial submucous 2.6cm fibroid and not a polyp in addition to another 2.4cm fibroid and o/w benign path  No issues with surgery and patient went home same day  Definitely had lots of gas and bloating the first couple days and then that improved. Incision with minimal soreness and really didn't even need narcotic pain meds other than a couple of times  Minimal to no vaginal bleeding  Continues to have struggles with constipation alternating with diarrhea. Will go a few days w/o a BM and then has done mag citrate to combat it. Has not tried just a daily stool softener though  Has not yet been sexually active  Going back to work next week at the [employer] and feeling ready for that  Had a little ramp up in her hot flashes after surgery for a few days. Now all resolved on her 0.1mg patch and stopped her provera    No LMP recorded. (Menstrual status: Irregular Periods).    Patient is not currently sexually active, .  Using not sexually active for contraception.    reports that she has never smoked. She has never used smokeless tobacco.    Health maintenance updated:  yes    Today's PHQ-2 Score:   PHQ-2 (  [employer]) 3/2/2020   Q1: Little interest or  pleasure in doing things 1   Q2: Feeling down, depressed or hopeless 1   PHQ-2 Score 2   Q1: Little interest or pleasure in doing things Several days   Q2: Feeling down, depressed or hopeless Several days   PHQ-2 Score 2     Today's PHQ-9 Score:   PHQ-9 SCORE 3/4/2020   PHQ-9 Total Score -   PHQ-9 Total Score 4     Today's MOOSE-7 Score:   MOOSE-7 SCORE 3/4/2020   Total Score 2       Problem list and histories reviewed & adjusted, as indicated.  Additional history: as documented.    Patient Active Problem List   Diagnosis     Acquired hypothyroidism     CARDIOVASCULAR SCREENING; LDL GOAL LESS THAN 160     Cluster headache     Anxiety     Mild major depression (H)     Dizziness     Low back pain     Hyperhidrosis of soles     AS (ankylosing spondylitis) (H)     Herpes labialis     Major depression     Sjogren's syndrome (H)     TMJ (temporomandibular joint syndrome)     Herpes zoster     OA (osteoarthritis)     Post-menopause on HRT (hormone replacement therapy)     Symptoms, such as flushing, sleeplessness, headache, lack of concentration, associated with the menopause     Cervical high risk HPV (human papillomavirus) test positive     DUB (dysfunctional uterine bleeding)     Endometrial polyp     Intramural leiomyoma of uterus     Cervical high risk human papillomavirus (HPV) DNA test positive     Past Surgical History:   Procedure Laterality Date     COLONOSCOPY N/A 12/9/2019    Procedure: COLONOSCOPY;  Surgeon: Gem Elder MD;  Location:  GI     CYSTOSCOPY N/A 12/8/2020    Procedure: CYSTOSCOPY;  Surgeon: Teresa Longoria MD;  Location:  OR      HYSTEROS W PERMANENT FALLOPAIN IMPLANT  8/30/2010    By Dr. Longoria      HYSTEROSALPINGOGRAM  12/28/10    post essure bilateral tubal occlusion     LAPAROSCOPIC HYSTERECTOMY TOTAL, BILATERAL SALPINGO-OOPHORECTOMY, COMBINED N/A 12/8/2020    Procedure: TOTAL LAPAROSCOPIC HYSTERECTOMY, BILATERAL SALPINGO OOPHORECTOMY,;  Surgeon: Teresa Longoria MD;  Location:   OR     MAMMOPLASTY AUGMENTATION  2008     ROTATOR CUFF REPAIR RT/LT  4/12      Social History     Tobacco Use     Smoking status: Never Smoker     Smokeless tobacco: Never Used   Substance Use Topics     Alcohol use: Yes     Alcohol/week: 0.0 standard drinks     Comment: 1/week      Problem (# of Occurrences) Relation (Name,Age of Onset)    Cancer (2) Brother: panc ca, Sister: skin cancer    Coronary Artery Disease (1) Father: mi, PVD     Diabetes (1) Maternal Grandfather    Hypertension (2) Father, Mother    Other Cancer (1) Father: skin ca    Rheumatoid Arthritis (2) Father, Sister: passed away    Substance Abuse (1) Brother: alcohol    Thyroid Disease (1) Maternal Grandfather            Current Outpatient Medications   Medication Sig     adalimumab (HUMIRA) 40 MG/0.8ML prefilled syringe kit Inject 40 mg Subcutaneous every 14 days      cevimeline (EVOXAC) 30 MG capsule Take 30 mg by mouth 3 times daily      Cholecalciferol (VITAMIN D PO) Take 1 tablet by mouth daily      estradiol (VIVELLE-DOT) 0.1 MG/24HR bi-weekly patch Place 1 patch onto the skin twice a week     folic acid (FOLVITE) 1 MG tablet Take 1 mg by mouth daily     methotrexate 2.5 MG tablet Take 15 mg by mouth every 7 days On Sundays (takes 6 x 2.5mg)     omeprazole 20 MG tablet Take 20 mg by mouth daily     Saccharomyces boulardii (PROBIOTIC) 250 MG CAPS      sertraline (ZOLOFT) 100 MG tablet Take 1.5 tablets (150 mg) by mouth daily     SUMAtriptan (IMITREX) 100 MG tablet Take 1 tablet (100 mg) by mouth at onset of headache     thyroid (ARMOUR) 60 MG tablet Take 1 tablet (60 mg) by mouth daily     valACYclovir (VALTREX) 1000 mg tablet Take 2 tablets (2,000 mg) by mouth 2 times daily as needed (for outbreaks)     cyclobenzaprine (FLEXERIL) 10 MG tablet Take 0.5-1 tablets (5-10 mg) by mouth 2 times daily as needed for muscle spasms (Patient not taking: Reported on 1/13/2021)     No current facility-administered medications for this visit.   "    Allergies   Allergen Reactions     Penicillins Rash       ROS:  12 point review of systems negative other than symptoms noted below or in the HPI.  Constitutional: Fatigue  Gastrointestinal: Abdominal Pain, Constipation and Diarrhea  No urinary frequency or dysuria, bladder or kidney problems      OBJECTIVE:     /68   Ht 1.626 m (5' 4\")   Wt 70.3 kg (155 lb)   BMI 26.61 kg/m    Body mass index is 26.61 kg/m .    Exam:  Constitutional:  Appearance: Well nourished, well developed alert, in no acute distress  Gastrointestinal:  Abdominal Examination:  Abdomen nontender to palpation, tone normal without rigidity or guarding, no masses present, umbilicus without lesions; Liver/Spleen:  No hepatomegaly present, liver nontender to palpation; Hernias:  No hernias present, ALL FOUR L/S INCISIONS HEALING WELL  Lymphatic: Lymph Nodes:  No other lymphadenopathy present  Skin: General Inspection:  No rashes present, no lesions present, no areas of discoloration.  Neurologic:  Mental Status:  Oriented X3.  Normal strength and tone, sensory exam grossly normal, mentation intact and speech normal.    Psychiatric:  Mentation appears normal and affect normal/bright.  Pelvic Exam:  External Genitalia:     Normal appearance for age, no discharge present, no tenderness present, no inflammatory lesions present, color normal  Vagina:     Normal vaginal vault without central or paravaginal defects, no discharge present, no inflammatory lesions present, no masses present, VAGINAL CUFF SUTURES NOT SEEN AND HEALING VERY WELL WITH ALMOST NO DISCHARGE NOTED. ON BIMANUAL CAN STILL FEEL THE SUTURES ALONG THE LENGTH OF THE CUFF BUT INTACT AND HEALING WELL  Bladder:     Nontender to palpation  Urethra:   Urethral Body:  Urethra palpation normal, urethra structural support normal   Urethral Meatus:  No erythema or lesions present  Cervix:     Surgically absent  Uterus:     Surgically absent  Adnexa:     Surgically absent  Perineum:  "    Perineum within normal limits, no evidence of trauma, no rashes or skin lesions present  Anus:     Anus within normal limits, no hemorrhoids present  Inguinal Lymph Nodes:     No lymphadenopathy present     In-Clinic Test Results:  No results found for this or any previous visit (from the past 24 hour(s)).    ASSESSMENT/PLAN:                                                        ICD-10-CM    1. Postop check  Z09    2. S/P laparoscopic hysterectomy  Z90.710          Patient is recovering great from surgery and reviewed the path and the surgery itself  Her abd incision are all well healed and the vaginal cuff is healing well  Due to the nature of the suture for TLH would advise to hold off on I.C for 10-14 more days which is fine b/c her BF lives out of state and his son just exposed him to covid. So no plans to come to [place] until quarantine is done  Ok to continue on her vivelle at current dose as wasn't fully menopausal when started on her HRT and then will work on slowly weaning over time  Advised her to start on daily miralax to prevent constipation rather than going 3+ days w/o BM and then doing mag citrate as a laxative and leading to diarrhea  Patient will start on this daily and see if can get things reset back to normal preop baseline    Teresa Longoria MD  [facility] [place]

## 2021-01-14 ENCOUNTER — DOCUMENTATION ONLY (OUTPATIENT)
Dept: OTHER | Facility: CLINIC | Age: 51
End: 2021-01-14

## 2021-03-04 NOTE — PROGRESS NOTES
Shelby is a 50 year old  female who presents for annual exam.     Besides routine health maintenance, she has no other health concerns today.    HPI:  The patient's PCP is Tessa Weaver PA-C.      Patient is now just about 2 months out from her tlh/bso and doing great. Recovery went really quite smoothly and had minimal bleeding if any and very little pain. So happy she had it done. Was having a hard time controlling her vasomotor sx, mostly night sweats but some day time hot flashes and mood issues, migraines, etc. Did a lot of different HRT regimens and would have improvement in her sx but too much DUB. The hyst has now eliminated progestin need, and so now just on vivelle and doing great with it.    Is on sertraline 150mg and that is helping her anxiety and depression and feels she's in a good place with that. Had a lot of situational factors over the years that contributed to that as well from divorce, her daughters both coming out as bonner and her late  not accepting that, then her second huband passing away a couple years ago, her younger daughter's mental health struggles, perimenopause. Now things in a much better place. With her BF about 5 months or so now and going great. [place] has been coming to see her a lot and helped after her hyst. However he then took a big fall off a ladder as a vasquez and got DVT/PE and so was caring for him and working remotely. Both her girls are generally doing well now though her younger one was just taken by ambulance this morning with severe HAs and seizures but normal head scans and think its a new depression med that is the cause. Also just found out just now that her CHAYITO is in the hospital with some sort of colon issue but didn't hear it from her sister so doesn't know what is going on. Despite these stressors feels she's handling it fine though    Migraines are much better and imitrex works if needs it. Fasting for labs today and  will need a refill on her armour thyroid if TSH is appropriate. Also needs refill on valtrex for outbreaks    Rheum is managing her A.S and sjogren's and on humira and low dose methotrexate     Last dexa  and was normal.      GYNECOLOGIC HISTORY:    Her current contraception method is: hysterectomy.  She  reports that she has never smoked. She has never used smokeless tobacco.    Patient is sexually active.  STD testing offered?  Declined     Last PHQ-9 score on record =   PHQ-9 SCORE 3/5/2021   PHQ-9 Total Score -   PHQ-9 Total Score 4     Last GAD7 score on record =   MOOSE-7 SCORE 3/5/2021   Total Score 1     Alcohol Score = 2    HEALTH MAINTENANCE:  Cholesterol:   Recent Labs   Lab Test 10/23/20  0953 09/24/15 01/23/13  0710   CHOL 190 187.0 176   HDL 70 64.0 63   * 110.0 97   TRIG 79 64.0 79   CHOLHDLRATIO  --   --  2.8     TSH   Date Value Ref Range Status   10/23/2020 0.97 0.40 - 4.00 mU/L Final     Last Mammo: 20, Result: Normal, Next Mammo: 03/10/2021   Pap:   Lab Results   Component Value Date    PAP NIL NEG-HPV 2020    PAP NIL 2019      Colonoscopy: 19, Result: Normal, repeat 10 years, Next Colonoscopy:   Dexa: 17    Health maintenance updated:  yes    HISTORY:  OB History    Para Term  AB Living   2 2 2 0 0 2   SAB TAB Ectopic Multiple Live Births   0 0 0 0 2      # Outcome Date GA Lbr Ramu/2nd Weight Sex Delivery Anes PTL Lv   2 Term 89    F    [facility]   1 Term 87    F    [facility]       Patient Active Problem List   Diagnosis     Acquired hypothyroidism     CARDIOVASCULAR SCREENING; LDL GOAL LESS THAN 160     Cluster headache     Anxiety     Mild major depression (H)     Dizziness     Low back pain     Hyperhidrosis of soles     AS (ankylosing spondylitis) (H)     Herpes labialis     Major depression     Sjogren's syndrome (H)     TMJ (temporomandibular joint syndrome)     Herpes zoster     OA (osteoarthritis)     Post-menopause on HRT  (hormone replacement therapy)     Symptoms, such as flushing, sleeplessness, headache, lack of concentration, associated with the menopause     Cervical high risk HPV (human papillomavirus) test positive     DUB (dysfunctional uterine bleeding)     Endometrial polyp     Intramural leiomyoma of uterus     Cervical high risk human papillomavirus (HPV) DNA test positive     Migraine without status migrainosus, not intractable, unspecified migraine type     Past Surgical History:   Procedure Laterality Date     COLONOSCOPY N/A 12/9/2019    Procedure: COLONOSCOPY;  Surgeon: Gem Elder MD;  Location:  GI     CYSTOSCOPY N/A 12/8/2020    Procedure: CYSTOSCOPY;  Surgeon: Teresa Longoria MD;  Location:  OR      HYSTEROS W PERMANENT FALLOPAIN IMPLANT  8/30/2010    By Dr. Longoria      HYSTEROSALPINGOGRAM  12/28/10    post essure bilateral tubal occlusion     LAPAROSCOPIC HYSTERECTOMY TOTAL, BILATERAL SALPINGO-OOPHORECTOMY, COMBINED N/A 12/8/2020    Procedure: TOTAL LAPAROSCOPIC HYSTERECTOMY, BILATERAL SALPINGO OOPHORECTOMY,;  Surgeon: Teresa Longoria MD;  Location:  OR     MAMMOPLASTY AUGMENTATION  2008     ROTATOR CUFF REPAIR RT/LT  4/12      Social History     Tobacco Use     Smoking status: Never Smoker     Smokeless tobacco: Never Used   Substance Use Topics     Alcohol use: Yes     Alcohol/week: 0.0 standard drinks     Comment: 1/week      Problem (# of Occurrences) Relation (Name,Age of Onset)    Cancer (2) Brother: panc ca, Sister: skin cancer    Coronary Artery Disease (1) Father: mi, PVD     Diabetes (1) Maternal Grandfather    Hypertension (2) Father, Mother    Other Cancer (1) Father: skin ca    Rheumatoid Arthritis (2) Father, Sister: passed away    Substance Abuse (1) Brother: alcohol    Thyroid Disease (1) Maternal Grandfather            Current Outpatient Medications   Medication Sig     adalimumab (HUMIRA) 40 MG/0.8ML prefilled syringe kit Inject 40 mg Subcutaneous every 14 days       "cevimeline (EVOXAC) 30 MG capsule Take 30 mg by mouth 3 times daily      Cholecalciferol (VITAMIN D PO) Take 1 tablet by mouth daily      [START ON 3/8/2021] estradiol (VIVELLE-DOT) 0.1 MG/24HR bi-weekly patch Place 1 patch onto the skin twice a week     folic acid (FOLVITE) 1 MG tablet Take 1 mg by mouth daily     methotrexate 2.5 MG tablet Take 15 mg by mouth every 7 days On Sundays (takes 6 x 2.5mg)     omeprazole 20 MG tablet Take 20 mg by mouth daily     Saccharomyces boulardii (PROBIOTIC) 250 MG CAPS      sertraline (ZOLOFT) 100 MG tablet Take 1.5 tablets (150 mg) by mouth daily     SUMAtriptan (IMITREX) 100 MG tablet Take 1 tablet (100 mg) by mouth at onset of headache for migraine     thyroid (ARMOUR) 60 MG tablet Take 1 tablet (60 mg) by mouth daily     valACYclovir (VALTREX) 1000 mg tablet Take 2 tablets (2,000 mg) by mouth 2 times daily as needed (for outbreaks)     cyclobenzaprine (FLEXERIL) 10 MG tablet Take 0.5-1 tablets (5-10 mg) by mouth 2 times daily as needed for muscle spasms (Patient not taking: Reported on 1/13/2021)     No current facility-administered medications for this visit.      Allergies   Allergen Reactions     Penicillins Rash       Past medical, surgical, social and family histories were reviewed and updated in EPIC.    ROS:   12 point review of systems negative other than symptoms noted below or in the HPI.  No urinary frequency or dysuria, bladder or kidney problems    EXAM:  /68   Ht 1.607 m (5' 3.25\")   Wt 70.3 kg (155 lb)   LMP 01/01/2019 (LMP Unknown)   BMI 27.24 kg/m     BMI: Body mass index is 27.24 kg/m .    PHYSICAL EXAM:  Constitutional:   Appearance: Well nourished, well developed, alert, in no acute distress  Neck:  Lymph Nodes:  No lymphadenopathy present    Thyroid:  Gland size normal, nontender, no nodules or masses present  on palpation  Chest:  Respiratory Effort:  Breathing unlabored  Cardiovascular:    Heart: Auscultation:  Regular rate, normal rhythm, no " murmurs present  Breasts: Palpation of Breasts and Axillae:  No masses present on palpation, no breast tenderness., Axillary Lymph Nodes:  No lymphadenopathy present., No nodularity, asymmetry or nipple discharge bilaterally. and IMPLANTS BILATERALLY  Gastrointestinal:   Abdominal Examination:  Abdomen nontender to palpation, tone normal without rigidity or guarding, no masses present, umbilicus without lesions   Liver and Spleen:  No hepatomegaly present, liver nontender to palpation    Hernias:  No hernias present  Lymphatic: Lymph Nodes:  No other lymphadenopathy present  Skin:  General Inspection:  No rashes present, no lesions present, no areas of  discoloration  Neurologic:    Mental Status:  Oriented X3.  Normal strength and tone, sensory exam                grossly normal, mentation intact and speech normal.    Psychiatric:   Mentation appears normal and affect normal/bright.         Pelvic Exam:  External Genitalia:     Normal appearance for age, no discharge present, no tenderness present, no inflammatory lesions present, color normal  Vagina:     Normal vaginal vault without central or paravaginal defects, no discharge present, no inflammatory lesions present, no masses present  Bladder:     Nontender to palpation  Urethra:   Urethral Body:  Urethra palpation normal, urethra structural support normal   Urethral Meatus:  No erythema or lesions present  Cervix:     Surgically absent  Uterus:     Surgically absent  Adnexa:     Surgically absent  Perineum:     Perineum within normal limits, no evidence of trauma, no rashes or skin lesions present  Anus:     Anus within normal limits, no hemorrhoids present  Inguinal Lymph Nodes:     No lymphadenopathy present    COUNSELING:   Reviewed preventive health counseling, as reflected in patient instructions  Special attention given to:        Immunizations    Vaccinated for: Zoster             Osteoporosis prevention/bone health       (Rani)menopause  management    BMI: Body mass index is 27.24 kg/m .      ASSESSMENT:  50 year old female with satisfactory annual exam.    ICD-10-CM    1. Encounter for gynecological examination without abnormal finding  Z01.419    2. Post-menopause on HRT (hormone replacement therapy)  Z79.890    3. Night sweats  R61 estradiol (VIVELLE-DOT) 0.1 MG/24HR bi-weekly patch   4. Recurrent major depression in remission (H)  F33.40 sertraline (ZOLOFT) 100 MG tablet   5. Anxiety  F41.9 sertraline (ZOLOFT) 100 MG tablet   6. Migraine without status migrainosus, not intractable, unspecified migraine type  G43.909 SUMAtriptan (IMITREX) 100 MG tablet   7. Hypothyroidism, unspecified type  E03.9 TSH   8. Herpes labialis  B00.1 valACYclovir (VALTREX) 1000 mg tablet   9. Screening for cardiovascular condition  Z13.6 Comprehensive metabolic panel     Lipid panel reflex to direct LDL Fasting   10. Screening for metabolic disorder  Z13.228    11. Encounter for vitamin deficiency screening  Z13.21 Vitamin D Deficiency   12. Screening for disorder of blood and blood-forming organs  Z13.0 CBC with platelets   13. Screening for diabetes mellitus  Z13.1 Hemoglobin A1c   14. Need for vaccination  Z23 SHINGRIX [[id number]]       PLAN:  Pap is no longer indicated now that she's had a hyst. Had HPV 16+ pap 2 yrs ago but nil/neg last year and now much lower risk for HPV to cause vaginal Ca  mammo had to be moved to 3/10 so will do fasting labs then.    Should consider repeat dexa in 1-2 yrs now that fully menopausal as was mostly perimenopausal 12/17 and not on consistent or very long standing HRT at that time    Refill on sertraline 150mg, imitrex 100mg, valtrex 1g tabs all sent in  Pending her TSH will refill her armour thyroid    Fasting labs in the near future when makes an appointment to do so,  along with a1c and D, CBC    Refill her ERT. Can start to consider weaning down dose next year now that sx controlled and no more DUB but since so many things going  on and just had her hyst, will give her one more year on this dose before seeing if can wean a bit with goal of lowest dose/short time possible while still having sx control    shingrix #1 today    Teresa Longoria MD

## 2021-03-05 ENCOUNTER — OFFICE VISIT (OUTPATIENT)
Dept: OBGYN | Facility: CLINIC | Age: 51
End: 2021-03-05
Payer: COMMERCIAL

## 2021-03-05 VITALS
DIASTOLIC BLOOD PRESSURE: 68 MMHG | SYSTOLIC BLOOD PRESSURE: 110 MMHG | HEIGHT: 63 IN | BODY MASS INDEX: 27.46 KG/M2 | WEIGHT: 155 LBS

## 2021-03-05 DIAGNOSIS — Z01.419 ENCOUNTER FOR GYNECOLOGICAL EXAMINATION WITHOUT ABNORMAL FINDING: Primary | ICD-10-CM

## 2021-03-05 DIAGNOSIS — Z13.6 SCREENING FOR CARDIOVASCULAR CONDITION: ICD-10-CM

## 2021-03-05 DIAGNOSIS — E03.9 HYPOTHYROIDISM, UNSPECIFIED TYPE: ICD-10-CM

## 2021-03-05 DIAGNOSIS — Z23 NEED FOR VACCINATION: ICD-10-CM

## 2021-03-05 DIAGNOSIS — Z79.890 POST-MENOPAUSE ON HRT (HORMONE REPLACEMENT THERAPY): ICD-10-CM

## 2021-03-05 DIAGNOSIS — G43.909 MIGRAINE WITHOUT STATUS MIGRAINOSUS, NOT INTRACTABLE, UNSPECIFIED MIGRAINE TYPE: ICD-10-CM

## 2021-03-05 DIAGNOSIS — F41.9 ANXIETY: ICD-10-CM

## 2021-03-05 DIAGNOSIS — Z13.21 ENCOUNTER FOR VITAMIN DEFICIENCY SCREENING: ICD-10-CM

## 2021-03-05 DIAGNOSIS — Z13.1 SCREENING FOR DIABETES MELLITUS: ICD-10-CM

## 2021-03-05 DIAGNOSIS — R61 NIGHT SWEATS: ICD-10-CM

## 2021-03-05 DIAGNOSIS — Z13.0 SCREENING FOR DISORDER OF BLOOD AND BLOOD-FORMING ORGANS: ICD-10-CM

## 2021-03-05 DIAGNOSIS — F33.40 RECURRENT MAJOR DEPRESSION IN REMISSION (H): ICD-10-CM

## 2021-03-05 DIAGNOSIS — Z13.228 SCREENING FOR METABOLIC DISORDER: ICD-10-CM

## 2021-03-05 DIAGNOSIS — B00.1 HERPES LABIALIS: ICD-10-CM

## 2021-03-05 PROCEDURE — 90471 IMMUNIZATION ADMIN: CPT | Performed by: OBSTETRICS & GYNECOLOGY

## 2021-03-05 PROCEDURE — 90750 HZV VACC RECOMBINANT IM: CPT | Performed by: OBSTETRICS & GYNECOLOGY

## 2021-03-05 PROCEDURE — 99396 PREV VISIT EST AGE 40-64: CPT | Mod: 25 | Performed by: OBSTETRICS & GYNECOLOGY

## 2021-03-05 RX ORDER — THYROID 60 MG/1
60 TABLET ORAL DAILY
Qty: 90 TABLET | Refills: 3 | Status: CANCELLED | OUTPATIENT
Start: 2021-03-05

## 2021-03-05 RX ORDER — SUMATRIPTAN 100 MG/1
100 TABLET, FILM COATED ORAL
Qty: 18 TABLET | Refills: 3 | Status: SHIPPED | OUTPATIENT
Start: 2021-03-05 | End: 2022-03-25

## 2021-03-05 RX ORDER — SERTRALINE HYDROCHLORIDE 100 MG/1
150 TABLET, FILM COATED ORAL DAILY
Qty: 135 TABLET | Refills: 3 | Status: SHIPPED | OUTPATIENT
Start: 2021-03-05 | End: 2022-03-25

## 2021-03-05 RX ORDER — ESTRADIOL 0.1 MG/D
1 FILM, EXTENDED RELEASE TRANSDERMAL
Qty: 24 PATCH | Refills: 3 | Status: SHIPPED | OUTPATIENT
Start: 2021-03-08 | End: 2022-02-07

## 2021-03-05 RX ORDER — VALACYCLOVIR HYDROCHLORIDE 1 G/1
2000 TABLET, FILM COATED ORAL 2 TIMES DAILY PRN
Qty: 12 TABLET | Refills: 1 | Status: SHIPPED | OUTPATIENT
Start: 2021-03-05 | End: 2022-03-22

## 2021-03-05 ASSESSMENT — PATIENT HEALTH QUESTIONNAIRE - PHQ9
5. POOR APPETITE OR OVEREATING: NOT AT ALL
SUM OF ALL RESPONSES TO PHQ QUESTIONS 1-9: 4

## 2021-03-05 ASSESSMENT — ANXIETY QUESTIONNAIRES
6. BECOMING EASILY ANNOYED OR IRRITABLE: NOT AT ALL
2. NOT BEING ABLE TO STOP OR CONTROL WORRYING: NOT AT ALL
1. FEELING NERVOUS, ANXIOUS, OR ON EDGE: NOT AT ALL
7. FEELING AFRAID AS IF SOMETHING AWFUL MIGHT HAPPEN: NOT AT ALL
5. BEING SO RESTLESS THAT IT IS HARD TO SIT STILL: NOT AT ALL
GAD7 TOTAL SCORE: 1
3. WORRYING TOO MUCH ABOUT DIFFERENT THINGS: SEVERAL DAYS
IF YOU CHECKED OFF ANY PROBLEMS ON THIS QUESTIONNAIRE, HOW DIFFICULT HAVE THESE PROBLEMS MADE IT FOR YOU TO DO YOUR WORK, TAKE CARE OF THINGS AT HOME, OR GET ALONG WITH OTHER PEOPLE: NOT DIFFICULT AT ALL

## 2021-03-05 ASSESSMENT — MIFFLIN-ST. JEOR: SCORE: 1286.17

## 2021-03-05 NOTE — NURSING NOTE
Prior to immunization administration, verified patients identity using patient s name and date of birth. Please see Immunization Activity for additional information.     Screening Questionnaire for Adult Immunization    Are you sick today?   No   Do you have allergies to medications, food, a vaccine component or latex?   No   Have you ever had a serious reaction after receiving a vaccination?   No   Do you have a long-term health problem with heart, lung, kidney, or metabolic disease (e.g., diabetes), asthma, a blood disorder, no spleen, complement component deficiency, a cochlear implant, or a spinal fluid leak?  Are you on long-term aspirin therapy?   No   Do you have cancer, leukemia, HIV/AIDS, or any other immune system problem?   No   Do you have a parent, brother, or sister with an immune system problem?   No   In the past 3 months, have you taken medications that affect  your immune system, such as prednisone, other steroids, or anticancer drugs; drugs for the treatment of rheumatoid arthritis, Crohn s disease, or psoriasis; or have you had radiation treatments?   No   Have you had a seizure, or a brain or other nervous system problem?   No   During the past year, have you received a transfusion of blood or blood    products, or been given immune (gamma) globulin or antiviral drug?   No   For women: Are you pregnant or is there a chance you could become       pregnant during the next month?   No   Have you received any vaccinations in the past 4 weeks?   No     Immunization questionnaire answers were all negative.        Per orders of Dr. Longoria, injection of Shingrix given by Wilda Fraser LPN. Patient instructed to remain in clinic for 15 minutes afterwards, and to report any adverse reaction to me immediately.       Screening performed by Wilda Fraser LPN on 3/5/2021 at 2:39 PM.

## 2021-03-06 ASSESSMENT — ANXIETY QUESTIONNAIRES: GAD7 TOTAL SCORE: 1

## 2021-03-07 PROBLEM — G43.909 MIGRAINE WITHOUT STATUS MIGRAINOSUS, NOT INTRACTABLE, UNSPECIFIED MIGRAINE TYPE: Status: ACTIVE | Noted: 2021-03-07

## 2021-03-10 ENCOUNTER — ANCILLARY PROCEDURE (OUTPATIENT)
Dept: MAMMOGRAPHY | Facility: CLINIC | Age: 51
End: 2021-03-10
Attending: OBSTETRICS & GYNECOLOGY
Payer: COMMERCIAL

## 2021-03-10 DIAGNOSIS — E03.9 HYPOTHYROIDISM, UNSPECIFIED TYPE: ICD-10-CM

## 2021-03-10 DIAGNOSIS — Z12.31 VISIT FOR SCREENING MAMMOGRAM: ICD-10-CM

## 2021-03-10 DIAGNOSIS — Z13.0 SCREENING FOR DISORDER OF BLOOD AND BLOOD-FORMING ORGANS: ICD-10-CM

## 2021-03-10 DIAGNOSIS — Z13.21 ENCOUNTER FOR VITAMIN DEFICIENCY SCREENING: ICD-10-CM

## 2021-03-10 DIAGNOSIS — Z13.6 SCREENING FOR CARDIOVASCULAR CONDITION: ICD-10-CM

## 2021-03-10 DIAGNOSIS — Z13.1 SCREENING FOR DIABETES MELLITUS: ICD-10-CM

## 2021-03-10 LAB
DEPRECATED CALCIDIOL+CALCIFEROL SERPL-MC: 27 UG/L (ref 20–75)
ERYTHROCYTE [DISTWIDTH] IN BLOOD BY AUTOMATED COUNT: 13.4 % (ref 10–15)
HBA1C MFR BLD: 5.1 % (ref 0–5.6)
HCT VFR BLD AUTO: 40.9 % (ref 35–47)
HGB BLD-MCNC: 13.2 G/DL (ref 11.7–15.7)
MCH RBC QN AUTO: 30.8 PG (ref 26.5–33)
MCHC RBC AUTO-ENTMCNC: 32.3 G/DL (ref 31.5–36.5)
MCV RBC AUTO: 96 FL (ref 78–100)
PLATELET # BLD AUTO: 233 10E9/L (ref 150–450)
RBC # BLD AUTO: 4.28 10E12/L (ref 3.8–5.2)
WBC # BLD AUTO: 4.9 10E9/L (ref 4–11)

## 2021-03-10 PROCEDURE — 84443 ASSAY THYROID STIM HORMONE: CPT | Performed by: OBSTETRICS & GYNECOLOGY

## 2021-03-10 PROCEDURE — 77067 SCR MAMMO BI INCL CAD: CPT | Mod: TC | Performed by: RADIOLOGY

## 2021-03-10 PROCEDURE — 82306 VITAMIN D 25 HYDROXY: CPT | Performed by: OBSTETRICS & GYNECOLOGY

## 2021-03-10 PROCEDURE — 83036 HEMOGLOBIN GLYCOSYLATED A1C: CPT | Performed by: OBSTETRICS & GYNECOLOGY

## 2021-03-10 PROCEDURE — 80053 COMPREHEN METABOLIC PANEL: CPT | Performed by: OBSTETRICS & GYNECOLOGY

## 2021-03-10 PROCEDURE — 80061 LIPID PANEL: CPT | Performed by: OBSTETRICS & GYNECOLOGY

## 2021-03-10 PROCEDURE — 85027 COMPLETE CBC AUTOMATED: CPT | Performed by: OBSTETRICS & GYNECOLOGY

## 2021-03-10 PROCEDURE — 36415 COLL VENOUS BLD VENIPUNCTURE: CPT | Performed by: OBSTETRICS & GYNECOLOGY

## 2021-03-11 LAB
ALBUMIN SERPL-MCNC: 4 G/DL (ref 3.4–5)
ALP SERPL-CCNC: 80 U/L (ref 40–150)
ALT SERPL W P-5'-P-CCNC: 30 U/L (ref 0–50)
ANION GAP SERPL CALCULATED.3IONS-SCNC: 2 MMOL/L (ref 3–14)
AST SERPL W P-5'-P-CCNC: 22 U/L (ref 0–45)
BILIRUB SERPL-MCNC: 0.4 MG/DL (ref 0.2–1.3)
BUN SERPL-MCNC: 12 MG/DL (ref 7–30)
CALCIUM SERPL-MCNC: 8.6 MG/DL (ref 8.5–10.1)
CHLORIDE SERPL-SCNC: 110 MMOL/L (ref 94–109)
CHOLEST SERPL-MCNC: 185 MG/DL
CO2 SERPL-SCNC: 27 MMOL/L (ref 20–32)
CREAT SERPL-MCNC: 0.95 MG/DL (ref 0.52–1.04)
GFR SERPL CREATININE-BSD FRML MDRD: 69 ML/MIN/{1.73_M2}
GLUCOSE SERPL-MCNC: 74 MG/DL (ref 70–99)
HDLC SERPL-MCNC: 67 MG/DL
LDLC SERPL CALC-MCNC: 101 MG/DL
NONHDLC SERPL-MCNC: 118 MG/DL
POTASSIUM SERPL-SCNC: 4.1 MMOL/L (ref 3.4–5.3)
PROT SERPL-MCNC: 7.1 G/DL (ref 6.8–8.8)
SODIUM SERPL-SCNC: 139 MMOL/L (ref 133–144)
TRIGL SERPL-MCNC: 86 MG/DL
TSH SERPL DL<=0.005 MIU/L-ACNC: 1.19 MU/L (ref 0.4–4)

## 2021-05-27 ENCOUNTER — TRANSFERRED RECORDS (OUTPATIENT)
Dept: HEALTH INFORMATION MANAGEMENT | Facility: CLINIC | Age: 51
End: 2021-05-27

## 2021-05-27 LAB
ALT SERPL-CCNC: 25 IU/L (ref 5–35)
AST SERPL-CCNC: 29 U/L (ref 5–34)
CREAT SERPL-MCNC: 0.75 MG/DL (ref 0.5–1.3)
GFR SERPL CREATININE-BSD FRML MDRD: 86.2 ML/MIN/1.73M2

## 2021-06-06 DIAGNOSIS — E03.9 HYPOTHYROIDISM, UNSPECIFIED TYPE: ICD-10-CM

## 2021-06-07 RX ORDER — THYROID 60 MG
TABLET ORAL
Qty: 90 TABLET | Refills: 2 | Status: SHIPPED | OUTPATIENT
Start: 2021-06-07 | End: 2022-02-14

## 2021-06-07 NOTE — TELEPHONE ENCOUNTER
"Requested Prescriptions   Pending Prescriptions Disp Refills     ARMOUR THYROID 60 MG tablet [Pharmacy Med Name: ARMOUR  60MG  TAB  THYROID] 90 tablet 3     Sig: TAKE 1 TABLET BY MOUTH  DAILY       Thyroid Protocol Passed - 6/6/2021  8:37 PM        Passed - Patient is 12 years or older        Passed - Recent (12 mo) or future (30 days) visit within the authorizing provider's specialty     Patient has had an office visit with the authorizing provider or a provider within the authorizing providers department within the previous 12 mos or has a future within next 30 days. See \"Patient Info\" tab in inbasket, or \"Choose Columns\" in Meds & Orders section of the refill encounter.              Passed - Medication is active on med list        Passed - Normal TSH on file in past 12 months     Recent Labs   Lab Test 03/10/21  0801   TSH 1.19              Passed - No active pregnancy on record     If patient is pregnant or has had a positive pregnancy test, please check TSH.          Passed - No positive pregnancy test in past 12 months     If patient is pregnant or has had a positive pregnancy test, please check TSH.             Last Written Prescription Date:  9/21/2020  Last Fill Quantity: 90,  # refills: 2   Last office visit: 3/5/2021 with prescribing provider:  Von   Future Office Visit:      Prescription approved per [facility] Refill Protocol.  Sameera Olguin RN on 6/7/2021 at 8:36 AM        "

## 2021-09-16 ENCOUNTER — OFFICE VISIT (OUTPATIENT)
Dept: FAMILY MEDICINE | Facility: CLINIC | Age: 51
End: 2021-09-16
Payer: COMMERCIAL

## 2021-09-16 VITALS
SYSTOLIC BLOOD PRESSURE: 114 MMHG | BODY MASS INDEX: 27.29 KG/M2 | WEIGHT: 154 LBS | HEIGHT: 63 IN | DIASTOLIC BLOOD PRESSURE: 77 MMHG | HEART RATE: 82 BPM | OXYGEN SATURATION: 96 % | TEMPERATURE: 96.8 F

## 2021-09-16 DIAGNOSIS — Z23 IMMUNIZATION DUE: ICD-10-CM

## 2021-09-16 DIAGNOSIS — R53.83 OTHER FATIGUE: Primary | ICD-10-CM

## 2021-09-16 DIAGNOSIS — M45.0 ANKYLOSING SPONDYLITIS OF MULTIPLE SITES IN SPINE (H): ICD-10-CM

## 2021-09-16 DIAGNOSIS — F32.0 MILD MAJOR DEPRESSION (H): ICD-10-CM

## 2021-09-16 DIAGNOSIS — K21.9 CHRONIC GERD: ICD-10-CM

## 2021-09-16 DIAGNOSIS — G44.209 TENSION HEADACHE: ICD-10-CM

## 2021-09-16 PROCEDURE — 0011A COVID-19,PF,MODERNA (18+ YRS): CPT | Performed by: PHYSICIAN ASSISTANT

## 2021-09-16 PROCEDURE — 99214 OFFICE O/P EST MOD 30 MIN: CPT | Mod: 25 | Performed by: PHYSICIAN ASSISTANT

## 2021-09-16 PROCEDURE — 96127 BRIEF EMOTIONAL/BEHAV ASSMT: CPT | Performed by: PHYSICIAN ASSISTANT

## 2021-09-16 PROCEDURE — 91301 COVID-19,PF,MODERNA (18+ YRS): CPT | Performed by: PHYSICIAN ASSISTANT

## 2021-09-16 RX ORDER — BUPROPION HYDROCHLORIDE 150 MG/1
150 TABLET ORAL EVERY MORNING
Qty: 30 TABLET | Refills: 1 | Status: SHIPPED | OUTPATIENT
Start: 2021-09-16 | End: 2021-10-14

## 2021-09-16 RX ORDER — FAMOTIDINE 40 MG/1
40 TABLET, FILM COATED ORAL DAILY
Qty: 90 TABLET | Refills: 3 | Status: SHIPPED | OUTPATIENT
Start: 2021-09-16 | End: 2023-03-14

## 2021-09-16 RX ORDER — CYCLOBENZAPRINE HCL 10 MG
5-10 TABLET ORAL 2 TIMES DAILY PRN
Qty: 30 TABLET | Refills: 0 | Status: SHIPPED | OUTPATIENT
Start: 2021-09-16

## 2021-09-16 ASSESSMENT — MIFFLIN-ST. JEOR: SCORE: 1274.25

## 2021-09-16 ASSESSMENT — PATIENT HEALTH QUESTIONNAIRE - PHQ9: SUM OF ALL RESPONSES TO PHQ QUESTIONS 1-9: 3

## 2021-09-16 NOTE — PROGRESS NOTES
HPI: Alvarado is a 52 yo female with ankylosing spondylitis and sjogrens here for f/u gerd and depression.  Has fatigue and not feeling great  Wonders if the zoloft still helping her  She is followed by rheum for her autoimmune disorder  She has gained weight since her hyst although   She exercises less since her   in   She is in a new relationship but not sure about it  Wakes up in the mornings for the past 10 years just not feeling great.  Her boyfriend notes she has night sweats despite the Vivelle dot  No longer having hot flashes  She wears a mouthguard for TMJ and thinks she may snore.  She did try cymbalta in  but may have had nausea and dizziness from this    She declines covid vaccine due to fear of side effects      Past Medical History:   Diagnosis Date     Abnormal Pap smear of cervix     +HPV s/p colp x 2     AS (ankylosing spondylitis) (H) 6/10/2013    Dr. Sloan     Cervical high risk HPV (human papillomavirus) test positive 2019    12/17/10 LSIL pap >> 11 Colpo- KENDY 1 11 NIL pap 12 LSIL pap/+ HR HPV >> 12 Colpo- cervicitis, KA 13 NIL pap, + HR HPV 14 NIL pap, Neg HPV 19 NIL pap, + HR HPV 16. Plan: colpo      KENDY I (cervical intraepithelial neoplasia I)     KENDY I at 11 o'clock     Cluster headache      Cold sore 3/2009     Condyloma acuminata 10/6/10 11/24/10, 12/10    Excision of larger condyloma and TCA with Von in oct.  TCA of perineal condyloma with Deon in nov. and again with Von 12/10     Depressive disorder      DUB (dysfunctional uterine bleeding)      GERD (gastroesophageal reflux disease)      Herpes labialis     HSV 2, was neg . then positive at the hospital  and then on repeat here 2 weeks after that 13 was neg again. as well as 2013 still neg.     Hx of abnormal cervical Pap smear      Hypothyroidism      Major depression     aakash lashanda following celexa     OA (osteoarthritis)     bilat great  toes, TCO     Post-menopause on HRT (hormone replacement therapy) 2/13/2016     Sjogren's syndrome (H) 1/2014    with dry eyes and mouth     Symptoms, such as flushing, sleeplessness, headache, lack of concentration, associated with the menopause 2/13/2016     TMJ (temporomandibular joint syndrome)      Past Surgical History:   Procedure Laterality Date     ABDOMEN SURGERY  12/2020     COLONOSCOPY N/A 12/9/2019    Procedure: COLONOSCOPY;  Surgeon: Gem Elder MD;  Location:  GI     CYSTOSCOPY N/A 12/8/2020    Procedure: CYSTOSCOPY;  Surgeon: Teresa Longoria MD;  Location:  OR      HYSTEROS W PERMANENT FALLOPAIN IMPLANT  8/30/2010    By Dr. Longoria     HC HYSTEROSALPINGOGRAM  12/28/10    post essure bilateral tubal occlusion     LAPAROSCOPIC HYSTERECTOMY TOTAL, BILATERAL SALPINGO-OOPHORECTOMY, COMBINED N/A 12/8/2020    Procedure: TOTAL LAPAROSCOPIC HYSTERECTOMY, BILATERAL SALPINGO OOPHORECTOMY,;  Surgeon: Teresa Longoria MD;  Location:  OR     MAMMOPLASTY AUGMENTATION  2008     ROTATOR CUFF REPAIR RT/LT  4/12     Social History     Tobacco Use     Smoking status: Never Smoker     Smokeless tobacco: Never Used   Substance Use Topics     Alcohol use: Yes     Alcohol/week: 0.0 standard drinks     Comment: OCCASSIONAL     Current Outpatient Medications   Medication Sig Dispense Refill     adalimumab (HUMIRA) 40 MG/0.8ML prefilled syringe kit Inject 40 mg Subcutaneous every 14 days        ARMOUR THYROID 60 MG tablet TAKE 1 TABLET BY MOUTH  DAILY 90 tablet 2     cevimeline (EVOXAC) 30 MG capsule Take 30 mg by mouth 3 times daily        Cholecalciferol (VITAMIN D PO) Take 1 tablet by mouth daily        cyclobenzaprine (FLEXERIL) 10 MG tablet Take 0.5-1 tablets (5-10 mg) by mouth 2 times daily as needed for muscle spasms 30 tablet 0     estradiol (VIVELLE-DOT) 0.1 MG/24HR bi-weekly patch Place 1 patch onto the skin twice a week 24 patch 3     famotidine (PEPCID) 40 MG tablet Take 1 tablet (40 mg) by mouth  "daily 90 tablet 3     folic acid (FOLVITE) 1 MG tablet Take 1 mg by mouth daily       methotrexate 2.5 MG tablet Take 15 mg by mouth every 7 days On Sundays (takes 6 x 2.5mg)       omeprazole 20 MG tablet Take 20 mg by mouth daily       Saccharomyces boulardii (PROBIOTIC) 250 MG CAPS        sertraline (ZOLOFT) 100 MG tablet Take 1.5 tablets (150 mg) by mouth daily 135 tablet 3     SUMAtriptan (IMITREX) 100 MG tablet Take 1 tablet (100 mg) by mouth at onset of headache for migraine 18 tablet 3     valACYclovir (VALTREX) 1000 mg tablet Take 2 tablets (2,000 mg) by mouth 2 times daily as needed (for outbreaks) 12 tablet 1     Allergies   Allergen Reactions     Penicillins Rash     FAMILY HISTORY NOTED AND REVIEWED    PHYSICAL EXAM:    /77 (BP Location: Right arm, Patient Position: Sitting, Cuff Size: Adult Large)   Pulse 82   Temp 96.8  F (36  C) (Temporal)   Ht 1.603 m (5' 3.1\")   Wt 69.9 kg (154 lb)   LMP 01/01/2019 (LMP Unknown)   SpO2 96%   BMI 27.19 kg/m      Patient appears non toxic  Psych: approp affect and mood    Assessment and Plan:     (R53.83) Other fatigue  (primary encounter diagnosis)  Comment:   Plan: suspect related to chronic dz and depression. Add Wellbutrin XL 150mg every day and f/u one month    (F32.0) Mild major depression (H)  Comment:   Plan: buPROPion (WELLBUTRIN XL) 150 MG 24 hr tablet            (G44.209) Tension headache  Comment:   Plan: cyclobenzaprine (FLEXERIL) 10 MG tablet        refilled    (M45.0) Ankylosing spondylitis of multiple sites in spine (H)  Comment:   Plan: on Humira and methotrexate and closely followed by rheum    (K21.9) Chronic GERD  Comment: she read that she should be on prilosec long term. We discussed pros and cons of this. Recd alt pepcid with prilosec and if still well controlled, could switch to pepcid.  Plan: famotidine (PEPCID) 40 MG tablet        qd    (Z23) Immunization due  Comment:   Plan: MODERNA COVID-19 VACCINE 2ND DOSE APPT,         " COVID-19,PF,MARIXA Weaver PA-C

## 2021-10-02 ENCOUNTER — HEALTH MAINTENANCE LETTER (OUTPATIENT)
Age: 51
End: 2021-10-02

## 2021-10-14 ENCOUNTER — IMMUNIZATION (OUTPATIENT)
Dept: NURSING | Facility: CLINIC | Age: 51
End: 2021-10-14
Attending: PHYSICIAN ASSISTANT
Payer: COMMERCIAL

## 2021-10-14 ENCOUNTER — OFFICE VISIT (OUTPATIENT)
Dept: FAMILY MEDICINE | Facility: CLINIC | Age: 51
End: 2021-10-14
Payer: COMMERCIAL

## 2021-10-14 ENCOUNTER — MYC MEDICAL ADVICE (OUTPATIENT)
Dept: FAMILY MEDICINE | Facility: CLINIC | Age: 51
End: 2021-10-14

## 2021-10-14 VITALS
SYSTOLIC BLOOD PRESSURE: 110 MMHG | BODY MASS INDEX: 27.46 KG/M2 | OXYGEN SATURATION: 99 % | DIASTOLIC BLOOD PRESSURE: 73 MMHG | RESPIRATION RATE: 16 BRPM | HEIGHT: 63 IN | WEIGHT: 155 LBS | HEART RATE: 69 BPM | TEMPERATURE: 96.8 F

## 2021-10-14 DIAGNOSIS — Z23 HIGH PRIORITY FOR 2019-NCOV VACCINE: Primary | ICD-10-CM

## 2021-10-14 DIAGNOSIS — Z23 IMMUNIZATION DUE: ICD-10-CM

## 2021-10-14 DIAGNOSIS — F32.0 MILD MAJOR DEPRESSION (H): ICD-10-CM

## 2021-10-14 PROCEDURE — 99213 OFFICE O/P EST LOW 20 MIN: CPT | Mod: 25 | Performed by: PHYSICIAN ASSISTANT

## 2021-10-14 PROCEDURE — 91301 COVID-19,PF,MODERNA (18+ YRS): CPT | Performed by: PHYSICIAN ASSISTANT

## 2021-10-14 PROCEDURE — 0012A COVID-19,PF,MODERNA (18+ YRS): CPT | Performed by: PHYSICIAN ASSISTANT

## 2021-10-14 RX ORDER — BUPROPION HYDROCHLORIDE 300 MG/1
300 TABLET ORAL EVERY MORNING
Qty: 90 TABLET | Refills: 1 | Status: SHIPPED | OUTPATIENT
Start: 2021-10-14 | End: 2021-10-15

## 2021-10-14 ASSESSMENT — MIFFLIN-ST. JEOR: SCORE: 1278.79

## 2021-10-14 NOTE — PROGRESS NOTES
HPI: joe is a pleasant 52 yo female here for covid vaccine #2 and f/u depression  We added Wellbutrin XL last month and notes increased energy, more clear, less depressed. Denies any SE. She would like to increase the dose.    She tolerated her covid vaccine well  She is also due to Shingrex #2, flu and Tdap    Past Medical History:   Diagnosis Date     Abnormal Pap smear of cervix     +HPV s/p colp x 2     AS (ankylosing spondylitis) (H) 6/10/2013    Dr. Sloan     Cervical high risk HPV (human papillomavirus) test positive 2/27/2019    12/17/10 LSIL pap >> 1/19/11 Colpo- KENDY 1 12/5/11 NIL pap 7/6/12 LSIL pap/+ HR HPV >> 8/29/12 Colpo- cervicitis, KA 1/6/13 NIL pap, + HR HPV 1/21/14 NIL pap, Neg HPV 2/27/19 NIL pap, + HR HPV 16. Plan: colpo      KENDY I (cervical intraepithelial neoplasia I) 1/11    KENDY I at 11 o'clock     Cluster headache      Cold sore 3/2009     Condyloma acuminata 10/6/10 11/24/10, 12/10    Excision of larger condyloma and TCA with Von in oct.  TCA of perineal condyloma with Deon in nov. and again with Von 12/10     Depressive disorder      DUB (dysfunctional uterine bleeding)      GERD (gastroesophageal reflux disease)      Herpes labialis 2013    HSV 2, was neg 2012. then positive at the hospital 1/13 and then on repeat here 2 weeks after that 2/6/13 was neg again. as well as June 2013 still neg.     Hx of abnormal cervical Pap smear 2012     Hypothyroidism      Major depression     aakash pyle following celexa     OA (osteoarthritis)     bilat great toes, TCO     Post-menopause on HRT (hormone replacement therapy) 2/13/2016     Sjogren's syndrome (H) 1/2014    with dry eyes and mouth     Symptoms, such as flushing, sleeplessness, headache, lack of concentration, associated with the menopause 2/13/2016     TMJ (temporomandibular joint syndrome)      Past Surgical History:   Procedure Laterality Date     ABDOMEN SURGERY  12/2020     COLONOSCOPY N/A 12/9/2019    Procedure:  COLONOSCOPY;  Surgeon: Gem Elder MD;  Location:  GI     CYSTOSCOPY N/A 12/8/2020    Procedure: CYSTOSCOPY;  Surgeon: Teresa Longoria MD;  Location:  OR      HYSTEROS W PERMANENT FALLOPAIN IMPLANT  8/30/2010    By Dr. Longoria     HC HYSTEROSALPINGOGRAM  12/28/10    post essure bilateral tubal occlusion     LAPAROSCOPIC HYSTERECTOMY TOTAL, BILATERAL SALPINGO-OOPHORECTOMY, COMBINED N/A 12/8/2020    Procedure: TOTAL LAPAROSCOPIC HYSTERECTOMY, BILATERAL SALPINGO OOPHORECTOMY,;  Surgeon: Teresa Longoria MD;  Location:  OR     MAMMOPLASTY AUGMENTATION  2008     ROTATOR CUFF REPAIR RT/LT  4/12     Social History     Tobacco Use     Smoking status: Never Smoker     Smokeless tobacco: Never Used   Substance Use Topics     Alcohol use: Yes     Alcohol/week: 0.0 standard drinks     Comment: OCCASSIONAL     Current Outpatient Medications   Medication Sig Dispense Refill     adalimumab (HUMIRA) 40 MG/0.8ML prefilled syringe kit Inject 40 mg Subcutaneous every 14 days        ARMOUR THYROID 60 MG tablet TAKE 1 TABLET BY MOUTH  DAILY 90 tablet 2     buPROPion (WELLBUTRIN XL) 300 MG 24 hr tablet Take 1 tablet (300 mg) by mouth every morning 90 tablet 1     cevimeline (EVOXAC) 30 MG capsule Take 30 mg by mouth 3 times daily        Cholecalciferol (VITAMIN D PO) Take 1 tablet by mouth daily        cyclobenzaprine (FLEXERIL) 10 MG tablet Take 0.5-1 tablets (5-10 mg) by mouth 2 times daily as needed for muscle spasms 30 tablet 0     estradiol (VIVELLE-DOT) 0.1 MG/24HR bi-weekly patch Place 1 patch onto the skin twice a week 24 patch 3     famotidine (PEPCID) 40 MG tablet Take 1 tablet (40 mg) by mouth daily 90 tablet 3     folic acid (FOLVITE) 1 MG tablet Take 1 mg by mouth daily       methotrexate 2.5 MG tablet Take 15 mg by mouth every 7 days On Sundays (takes 6 x 2.5mg)       omeprazole 20 MG tablet Take 20 mg by mouth daily       Saccharomyces boulardii (PROBIOTIC) 250 MG CAPS        sertraline (ZOLOFT) 100 MG  "tablet Take 1.5 tablets (150 mg) by mouth daily 135 tablet 3     SUMAtriptan (IMITREX) 100 MG tablet Take 1 tablet (100 mg) by mouth at onset of headache for migraine 18 tablet 3     valACYclovir (VALTREX) 1000 mg tablet Take 2 tablets (2,000 mg) by mouth 2 times daily as needed (for outbreaks) 12 tablet 1     Allergies   Allergen Reactions     Penicillins Rash     FAMILY HISTORY NOTED AND REVIEWED    PHYSICAL EXAM:    /73 (BP Location: Right arm, Patient Position: Sitting, Cuff Size: Adult Regular)   Pulse 69   Temp 96.8  F (36  C) (Temporal)   Resp 16   Ht 1.603 m (5' 3.1\")   Wt 70.3 kg (155 lb)   LMP 01/01/2019 (LMP Unknown)   SpO2 99%   BMI 27.37 kg/m      Patient appears non toxic  Psych: approp affect and mood    Assessment and Plan:     (Z23) High priority for 2019-nCoV vaccine  (primary encounter diagnosis)  Comment:   Plan: COVID-19,PF,MODERNA            (F32.0) Mild major depression (H)  Comment: increased wellbutrin from 150mg every day to 300mg every day. Let me know how that is working in about a month via [employer].  Plan: buPROPion (WELLBUTRIN XL) 300 MG 24 hr tablet        #90      Tessa Weaver PA-C        "

## 2021-10-18 RX ORDER — BUPROPION HYDROCHLORIDE 300 MG/1
300 TABLET ORAL EVERY MORNING
Qty: 90 TABLET | Refills: 2 | Status: SHIPPED | OUTPATIENT
Start: 2021-10-18 | End: 2022-03-25

## 2021-11-10 ENCOUNTER — ALLIED HEALTH/NURSE VISIT (OUTPATIENT)
Dept: FAMILY MEDICINE | Facility: CLINIC | Age: 51
End: 2021-11-10
Payer: COMMERCIAL

## 2021-11-10 DIAGNOSIS — Z23 NEED FOR PROPHYLACTIC VACCINATION AND INOCULATION AGAINST INFLUENZA: Primary | ICD-10-CM

## 2021-11-10 PROCEDURE — 90471 IMMUNIZATION ADMIN: CPT

## 2021-11-10 PROCEDURE — 99207 PR NO CHARGE NURSE ONLY: CPT

## 2021-11-10 PROCEDURE — 90682 RIV4 VACC RECOMBINANT DNA IM: CPT

## 2021-11-17 ENCOUNTER — MYC MEDICAL ADVICE (OUTPATIENT)
Dept: FAMILY MEDICINE | Facility: CLINIC | Age: 51
End: 2021-11-17
Payer: COMMERCIAL

## 2021-11-17 NOTE — TELEPHONE ENCOUNTER
S-(situation): Patient has positive Covid breakthrough infection.    She has URI symptoms. Possible fever.    She will  continue to take home care  for her symptoms.       B-(background): Positive covid infection.     A-(assessment): Covid home care.     R-(recommendations): Continue to quarantine and notify anyone she was around so they can watch for symptoms. She will call if her symptoms change and needs advisement on ED care.     Ginna Quintana RN  -[facility]

## 2021-12-09 ENCOUNTER — TRANSFERRED RECORDS (OUTPATIENT)
Dept: HEALTH INFORMATION MANAGEMENT | Facility: CLINIC | Age: 51
End: 2021-12-09
Payer: COMMERCIAL

## 2021-12-09 LAB
ALT SERPL-CCNC: 20 IU/L (ref 5–35)
AST SERPL-CCNC: 22 U/L (ref 5–34)
CREATININE (EXTERNAL): 0.98 MG/DL (ref 0.5–1.3)
GFR ESTIMATED (EXTERNAL): 63.1 ML/MIN/1.73M2

## 2022-01-17 DIAGNOSIS — F41.9 ANXIETY: ICD-10-CM

## 2022-01-17 DIAGNOSIS — F33.40 RECURRENT MAJOR DEPRESSION IN REMISSION (H): ICD-10-CM

## 2022-01-17 RX ORDER — SERTRALINE HYDROCHLORIDE 100 MG/1
TABLET, FILM COATED ORAL
Qty: 135 TABLET | Refills: 3 | OUTPATIENT
Start: 2022-01-17

## 2022-01-18 NOTE — TELEPHONE ENCOUNTER
"Requested Prescriptions   Pending Prescriptions Disp Refills     sertraline (ZOLOFT) 100 MG tablet [Pharmacy Med Name: Sertraline HCl 100 MG Oral Tablet] 135 tablet 3     Sig: TAKE 1 AND 1/2 TABLETS BY  MOUTH DAILY       SSRIs Protocol Failed - 1/17/2022  8:22 PM        Failed - Recent (6 mo) or future (30 days) visit within the authorizing provider's specialty     Patient had office visit in the last 6 months or has a visit in the next 30 days with authorizing provider or within the authorizing provider's specialty.  See \"Patient Info\" tab in inbasket, or \"Choose Columns\" in Meds & Orders section of the refill encounter.            Passed - PHQ-9 score less than 5 in past 6 months     Please review last PHQ-9 score.           Passed - Medication is active on med list        Passed - Patient is age 18 or older        Passed - No active pregnancy on record        Passed - No positive pregnancy test in last 12 months           Refills available  Radha De Leon RN on 1/17/2022 at 10:32 PM    "

## 2022-02-06 DIAGNOSIS — R61 NIGHT SWEATS: ICD-10-CM

## 2022-02-07 RX ORDER — ESTRADIOL 0.1 MG/D
PATCH, EXTENDED RELEASE TRANSDERMAL
Qty: 24 PATCH | Refills: 0 | Status: SHIPPED | OUTPATIENT
Start: 2022-02-07 | End: 2022-05-02 | Stop reason: ALTCHOICE

## 2022-02-07 NOTE — TELEPHONE ENCOUNTER
"Requested Prescriptions   Pending Prescriptions Disp Refills     VIVELLE-DOT 0.1 MG/24HR bi-weekly patch [Pharmacy Med Name: VIVELLE-DOT PATCH 0.1MG] 24 patch 3     Sig: APPLY 1 PATCH TOPICALLY TO  SKIN TWICE WEEKLY       Hormone Replacement Therapy Passed - 2/6/2022  8:26 PM        Passed - Blood pressure under 140/90 in past 12 months     BP Readings from Last 3 Encounters:   10/14/21 110/73   09/16/21 114/77   03/05/21 110/68                 Passed - Recent (12 mo) or future (30 days) visit within the authorizing provider's specialty     Patient has had an office visit with the authorizing provider or a provider within the authorizing providers department within the previous 12 mos or has a future within next 30 days. See \"Patient Info\" tab in inbasket, or \"Choose Columns\" in Meds & Orders section of the refill encounter.              Passed - Patient has mammogram in past 2 years on file if age 50-75        Passed - Medication is active on med list        Passed - Patient is 18 years of age or older        Passed - No active pregnancy on record        Passed - No positive pregnancy test on record in past 12 months           Last Written Prescription Date:  3/8/21  Last Fill Quantity: 24,  # refills: 3   Last office visit: 3/5/2021 with prescribing provider:  Dr Longoria   Future Office Visit:   Next 5 appointments (look out 90 days)    Mar 25, 2022 11:00 AM  PHYSICAL with Teresa Longoria MD  [facility] ([facility] ) [address] [place] [id number] [phone]                 "

## 2022-02-07 NOTE — TELEPHONE ENCOUNTER
Prescription approved per [facility] Refill Protocol.  Yajaira Noland RN on 2/7/2022 at 2:41 PM

## 2022-02-10 ENCOUNTER — MYC MEDICAL ADVICE (OUTPATIENT)
Dept: FAMILY MEDICINE | Facility: CLINIC | Age: 52
End: 2022-02-10
Payer: COMMERCIAL

## 2022-02-10 ENCOUNTER — MYC MEDICAL ADVICE (OUTPATIENT)
Dept: OBGYN | Facility: CLINIC | Age: 52
End: 2022-02-10
Payer: COMMERCIAL

## 2022-02-11 DIAGNOSIS — E03.9 HYPOTHYROIDISM, UNSPECIFIED TYPE: ICD-10-CM

## 2022-02-11 NOTE — TELEPHONE ENCOUNTER
Patient completed urgent care visit for her symptoms.     Ginna Quintana RN  [facility]

## 2022-02-14 RX ORDER — THYROID 60 MG
TABLET ORAL
Qty: 90 TABLET | Refills: 0 | Status: SHIPPED | OUTPATIENT
Start: 2022-02-14 | End: 2022-03-26

## 2022-02-14 NOTE — TELEPHONE ENCOUNTER
"Requested Prescriptions   Pending Prescriptions Disp Refills     ARMOUR THYROID 60 MG tablet [Pharmacy Med Name: ARMOUR  60MG  TAB  THYROID] 90 tablet 3     Sig: TAKE 1 TABLET BY MOUTH  DAILY       Thyroid Protocol Passed - 2/11/2022  9:45 PM        Passed - Patient is 12 years or older        Passed - Recent (12 mo) or future (30 days) visit within the authorizing provider's specialty     Patient has had an office visit with the authorizing provider or a provider within the authorizing providers department within the previous 12 mos or has a future within next 30 days. See \"Patient Info\" tab in inbasket, or \"Choose Columns\" in Meds & Orders section of the refill encounter.              Passed - Medication is active on med list        Passed - Normal TSH on file in past 12 months     Recent Labs   Lab Test 03/10/21  0801   TSH 1.19              Passed - No active pregnancy on record     If patient is pregnant or has had a positive pregnancy test, please check TSH.          Passed - No positive pregnancy test in past 12 months     If patient is pregnant or has had a positive pregnancy test, please check TSH.             Prescription approved per [facility] Refill Protocol.  Appointment needed for further refills  Radha De Leon RN on 2/14/2022 at 9:50 AM    "

## 2022-03-21 DIAGNOSIS — B00.1 HERPES LABIALIS: ICD-10-CM

## 2022-03-22 ENCOUNTER — ANCILLARY PROCEDURE (OUTPATIENT)
Dept: MAMMOGRAPHY | Facility: CLINIC | Age: 52
End: 2022-03-22
Payer: COMMERCIAL

## 2022-03-22 DIAGNOSIS — Z12.31 VISIT FOR SCREENING MAMMOGRAM: ICD-10-CM

## 2022-03-22 PROCEDURE — 77067 SCR MAMMO BI INCL CAD: CPT | Mod: TC | Performed by: RADIOLOGY

## 2022-03-22 RX ORDER — VALACYCLOVIR HYDROCHLORIDE 1 G/1
TABLET, FILM COATED ORAL
Qty: 12 TABLET | Refills: 3 | Status: SHIPPED | OUTPATIENT
Start: 2022-03-22 | End: 2022-03-25

## 2022-03-22 NOTE — TELEPHONE ENCOUNTER
"Requested Prescriptions   Pending Prescriptions Disp Refills     valACYclovir (VALTREX) 1000 mg tablet [Pharmacy Med Name: VALACYCLOVIR  1GM  TAB] 12 tablet 3     Sig: TAKE 2 TABLETS BY MOUTH  TWICE DAILY AS NEEDED FOR  OUTBREAKS       Antivirals for Herpes Protocol Passed - 3/21/2022  8:25 PM        Passed - Patient is age 12 or older        Passed - Recent (12 mo) or future (30 days) visit within the authorizing provider's specialty     Patient has had an office visit with the authorizing provider or a provider within the authorizing providers department within the previous 12 mos or has a future within next 30 days. See \"Patient Info\" tab in inbasket, or \"Choose Columns\" in Meds & Orders section of the refill encounter.              Passed - Medication is active on med list        Passed - Normal serum creatinine on file in past 12 months     Recent Labs   Lab Test 05/27/21  0000   CR 0.750       Ok to refill medication if creatinine is low             Next 5 appointments (look out 90 days)    Mar 25, 2022 11:00 AM  PHYSICAL with Teresa Longoria MD  [facility] ([facility] ) [address] [place] [id number]  [phone]        Prescription approved per [facility] Refill Protocol.  Radha De Leon RN on 3/22/2022 at 8:50 AM    "

## 2022-03-24 NOTE — PROGRESS NOTES
"Shelby is a 51 year old  female who presents for annual exam.     Besides routine health maintenance, she has no other health concerns today.    HPI:  The patient's PCP is Tessa Weaver PA-C.      Patient is doing ok but having some relationship struggles. Her BF lived out [place] state and very quickly moved in with her so never really took it at a normal pace and it's impacting her overall mood. Not doing her normal routines and normal self cares. Eating whatever, not exercising, he's not interested in those things so it's derailing her from doing what she needs. Isn't going to breakup with him but is going to have him move in to his own place and slow down and hoping that will allow her to sleep better, exercise and do what she needs to. Other than that specific stressor her dep/anxiety do seem to be ok on her sert 150mg and wellbutrin 300mg.    So happy she had the hyst last year. Done for DUB anytime HRT was attempted. Her v.m sx were hard to control and tried many different versions of HRT and either not enough benefit or some with enough benefit but then constant DUB. Easy recovery and no issues. Doing her vivelle 0.1mg patch. Changes it religiously every 3.5 days. Having nightsweats >50% of nights. Not as intense or long lived, none during the day, but still frequently enough that waking her up sweaty and BF says she is radiating heat in the night.    Patient gets occasional cold sores so takes valtrex when she gets them but not daily. Her BF just recently had what appeared to be genital herpes. He was given valtrex but tested \"for everything\" and tested neg for herpes so now a bit concerned if it was something else or if she gave him genital herpes with her cold sores. Would like to just do full STD testing.  H.o condyloma in the past but not for years  However in the last couple months has all sorts of little bumps on her labia and groin. A little firm like an ingrown hair but nothing comes out " when tries to squeeze them. Supposed to do bikini line laser hair removal next week. Wondering if she should.    Fasting for labs and needs biometric form done as well as her armour thyroid refilled if TSH is normal.    Patient feels like she was pressured to get her covid vaxx for work and wasn't really wanting to do it but did. Had mild short lived s.e after her second shot but nothing bad. Really doesn't think she's going to do a booster. Is also due for her shingles #2 as never did that and a tdap. Open to at least the shingles shot but not sure about doing tdap today or multiple at once    Her sjogrens and Ank Spon are being managed by rheum. Low dose methotrexate of 2.5mg and one other immune suppressing med on board. Did only 2 shots of moderna when had it b/c 3 shots wasn't recommended for immune suppressed at that point.      GYNECOLOGIC HISTORY:    Patient's last menstrual period was 2019 (lmp unknown).    Her current contraception method is: hysterectomy.  She  reports that she has never smoked. She has never used smokeless tobacco.    Patient is sexually active.  STD testing offered?  Accepted     Last PHQ-9 score on record =   PHQ-9 SCORE 3/25/2022   PHQ-9 Total Score -   PHQ-9 Total Score 3     Last GAD7 score on record =   MOOSE-7 SCORE 3/25/2022   Total Score 0     Alcohol Score = 2    HEALTH MAINTENANCE:  Cholesterol:   Recent Labs   Lab Test 03/10/21  0801 10/23/20  0953   CHOL 185 190   HDL 67 70   * 104*   TRIG 86 79     TSH   Date Value Ref Range Status   2022 1.26 0.40 - 4.00 mU/L Final   03/10/2021 1.19 0.40 - 4.00 mU/L Final     Last Mammo: 22, Result: Normal, Next Mammo:   Pap:   Lab Results   Component Value Date    PAP NIL NEG-HPV 2020    PAP NIL 2019      Colonoscopy:  19, Result: Normal, repeat 10 years, Next Colonoscopy:   Dexa:  17    Health maintenance updated:  yes    HISTORY:  OB History    Para Term  AB Living   2  2 2 0 0 2   SAB IAB Ectopic Multiple Live Births   0 0 0 0 2      # Outcome Date GA Lbr Ramu/2nd Weight Sex Delivery Anes PTL Lv   2 Term 89    F    [facility]   1 Term 87    F    [facility]       Patient Active Problem List   Diagnosis     Acquired hypothyroidism     CARDIOVASCULAR SCREENING; LDL GOAL LESS THAN 160     Cluster headache     Anxiety     Mild major depression (H)     Dizziness     Low back pain     Hyperhidrosis of soles     AS (ankylosing spondylitis) (H)     Herpes labialis     Major depression     Sjogren's syndrome (H)     TMJ (temporomandibular joint syndrome)     Herpes zoster     OA (osteoarthritis)     Post-menopause on HRT (hormone replacement therapy)     Symptoms, such as flushing, sleeplessness, headache, lack of concentration, associated with the menopause     Cervical high risk HPV (human papillomavirus) test positive     DUB (dysfunctional uterine bleeding)     Endometrial polyp     Intramural leiomyoma of uterus     Cervical high risk human papillomavirus (HPV) DNA test positive     Migraine without status migrainosus, not intractable, unspecified migraine type     Recurrent major depression in remission (H)     Past Surgical History:   Procedure Laterality Date     ABDOMEN SURGERY  2020     COLONOSCOPY N/A 2019    Procedure: COLONOSCOPY;  Surgeon: Gem Elder MD;  Location:  GI     CYSTOSCOPY N/A 2020    Procedure: CYSTOSCOPY;  Surgeon: Teresa Longoria MD;  Location: [facility] HYSTEROS W PERMANENT FALLOPAIN IMPLANT  2010    By Dr. Longoria      HYSTEROSALPINGOGRAM  12/28/10    post essure bilateral tubal occlusion     LAPAROSCOPIC HYSTERECTOMY TOTAL, BILATERAL SALPINGO-OOPHORECTOMY, COMBINED N/A 2020    Procedure: TOTAL LAPAROSCOPIC HYSTERECTOMY, BILATERAL SALPINGO OOPHORECTOMY,;  Surgeon: Teresa Longoria MD;  Location:  OR     MAMMOPLASTY AUGMENTATION       ROTATOR CUFF REPAIR RT/LT        Social History     Tobacco Use      Smoking status: Never Smoker     Smokeless tobacco: Never Used   Substance Use Topics     Alcohol use: Yes     Alcohol/week: 0.0 standard drinks     Comment: OCCASSIONAL      Problem (# of Occurrences) Relation (Name,Age of Onset)    Cancer (2) Brother (Marko): panc ca, Sister: skin cancer    Coronary Artery Disease (1) Father (Guilherme): mi, PVD     Diabetes (1) Maternal Grandfather    Hypertension (2) Father (Guilherme), Mother (Jacqueline)    Other Cancer (2) Father (Guilherme): Skin, Brother (Hal): Pancreatic    Rheumatoid Arthritis (2) Father (Guilherme), Sister: passed away    Substance Abuse (2) Brother (Marko), Brother (Hal): alcohol    Thyroid Disease (2) Maternal Grandfather, Maternal Grandmother (Patito)            Current Outpatient Medications   Medication Sig     adalimumab (HUMIRA) 40 MG/0.8ML prefilled syringe kit Inject 40 mg Subcutaneous every 14 days      buPROPion (WELLBUTRIN XL) 300 MG 24 hr tablet Take 1 tablet (300 mg) by mouth every morning     cevimeline (EVOXAC) 30 MG capsule Take 30 mg by mouth 3 times daily      Cholecalciferol (VITAMIN D PO) Take 1 tablet by mouth daily      cyclobenzaprine (FLEXERIL) 10 MG tablet Take 0.5-1 tablets (5-10 mg) by mouth 2 times daily as needed for muscle spasms     estrogens-methylTESTOSTERone (ESTRATEST HS) 0.625-1.25 MG per tablet Take 1 tablet by mouth daily     famotidine (PEPCID) 40 MG tablet Take 1 tablet (40 mg) by mouth daily     folic acid (FOLVITE) 1 MG tablet Take 1 mg by mouth daily     methotrexate 2.5 MG tablet Take 15 mg by mouth every 7 days On Sundays (takes 6 x 2.5mg)     omeprazole 20 MG tablet Take 20 mg by mouth daily     Saccharomyces boulardii (PROBIOTIC) 250 MG CAPS      sertraline (ZOLOFT) 100 MG tablet Take 1.5 tablets (150 mg) by mouth daily     SUMAtriptan (IMITREX) 100 MG tablet Take 1 tablet (100 mg) by mouth at onset of headache for migraine     thyroid (ARMOUR THYROID) 60 MG tablet Take 1 tablet (60 mg) by mouth daily     valACYclovir (VALTREX)  "1000 mg tablet TAKE 2 TABLETS BY MOUTH  TWICE DAILY AS NEEDED FOR  OUTBREAKS     VIVELLE-DOT 0.1 MG/24HR bi-weekly patch APPLY 1 PATCH TOPICALLY TO  SKIN TWICE WEEKLY     No current facility-administered medications for this visit.     Allergies   Allergen Reactions     Penicillins Rash       Past medical, surgical, social and family histories were reviewed and updated in EPIC.    ROS:   12 point review of systems negative other than symptoms noted below or in the HPI.  No urinary frequency or dysuria, bladder or kidney problems, POSITIVE for:, night sweats, vulvar lesions    EXAM:  /76   Ht 1.607 m (5' 3.25\")   Wt 70.3 kg (155 lb)   LMP 01/01/2019 (LMP Unknown)   BMI 27.24 kg/m     BMI: Body mass index is 27.24 kg/m .    PHYSICAL EXAM:  Constitutional:   Appearance: Well nourished, well developed, alert, in no acute distress  Neck:  Lymph Nodes:  No lymphadenopathy present    Thyroid:  Gland size normal, nontender, no nodules or masses present  on palpation  Chest:  Respiratory Effort:  Breathing unlabored, CTA bilaterally  Cardiovascular:    Heart: Auscultation:  Regular rate, normal rhythm, no murmurs present  Breasts: Palpation of Breasts and Axillae:  No masses present on palpation, no breast tenderness., Axillary Lymph Nodes:  No lymphadenopathy present., No nodularity, asymmetry or nipple discharge bilaterally. and IMPLANTS BILATERALLY  Gastrointestinal:   Abdominal Examination:  Abdomen nontender to palpation, tone normal without rigidity or guarding, no masses present, umbilicus without lesions   Liver and Spleen:  No hepatomegaly present, liver nontender to palpation    Hernias:  No hernias present  Lymphatic: Lymph Nodes:  No other lymphadenopathy present  Skin:  General Inspection:  No rashes present, no lesions present, no areas of  discoloration  Neurologic:    Mental Status:  Oriented X3.  Normal strength and tone, sensory exam                grossly normal, mentation intact and speech " "normal.    Psychiatric:   Mentation appears normal and affect normal/bright.         Pelvic Exam:  External Genitalia:     Normal appearance for age, no discharge present, no tenderness present, no inflammatory lesions present, color normal, DIFFUSE MOLLUSCUM ON HAIR BEARING AREA. ON LABIA APPEARED MORE SMOOTH LIKE PAPILLOMA AND ON MONS MORE TYPICAL MOLLUSCUM BUT ALL C/W MOLLUSCUM  Vagina:     Normal vaginal vault without central or paravaginal defects, PHYSIOLOGIC discharge present, no inflammatory lesions present, no masses present  Bladder:     Nontender to palpation  Urethra:   Urethral Body:  Urethra palpation normal, urethra structural support normal   Urethral Meatus:  No erythema or lesions present  Cervix:     Surgically absent  Uterus:     Surgically absent  Adnexa:     Surgically absent  Perineum:     Perineum within normal limits, no evidence of trauma, no rashes or skin lesions present  Anus:     Anus within normal limits, no hemorrhoids present  Inguinal Lymph Nodes:     No lymphadenopathy present    COUNSELING:   Reviewed preventive health counseling, as reflected in patient instructions  Special attention given to:        Regular exercise       Healthy diet/nutrition       Immunizations    Vaccinated for: shingrix #2 and given full dose \"3rd shot\" of covid vaxx rather than just lower dose booster shoot    Will do tdap in the future           Safe sex practices/STD prevention       (Rani)menopause management    BMI: Body mass index is 27.24 kg/m .  Weight management plan: Discussed healthy diet and exercise guidelines    ASSESSMENT:  51 year old female with satisfactory annual exam.    ICD-10-CM    1. Encounter for gynecological examination without abnormal finding  Z01.419    2. Acquired hypothyroidism  E03.9 TSH     TSH   3. Recurrent major depression in remission (H)  F33.40 sertraline (ZOLOFT) 100 MG tablet     buPROPion (WELLBUTRIN XL) 300 MG 24 hr tablet   4. Anxiety  F41.9 sertraline (ZOLOFT) " 100 MG tablet   5. Migraine without status migrainosus, not intractable, unspecified migraine type  G43.909 SUMAtriptan (IMITREX) 100 MG tablet   6. Herpes labialis  B00.1 valACYclovir (VALTREX) 1000 mg tablet   7. Night sweats  R61 estrogens-methylTESTOSTERone (ESTRATEST HS) 0.625-1.25 MG per tablet   8. High priority for 2019-nCoV vaccine  Z23 COVID-19,PF,MODERNA (18+ Yrs BOOSTER .25mL)   9. Screen for STD (sexually transmitted disease)  Z11.3 Neisseria gonorrhoeae PCR     Herpes Simplex Virus 1 and 2 IgG     HIV Antigen Antibody Combo     Treponema Abs w Reflex to RPR and Titer     Herpes Simplex Virus 1 and 2 IgG     HIV Antigen Antibody Combo     Treponema Abs w Reflex to RPR and Titer   10. Screening for chlamydial disease  Z11.8 Chlamydia trachomatis PCR   11. Encounter for lipid screening for cardiovascular disease  Z13.220 Lipid panel reflex to direct LDL Fasting    Z13.6 Lipid panel reflex to direct LDL Fasting   12. Screening for metabolic disorder  Z13.228 Comprehensive metabolic panel     Comprehensive metabolic panel   13. Need for vaccination  Z23 SHINGRIX [[id number]]   14. Hypothyroidism, unspecified type  E03.9 thyroid (ARMOUR THYROID) 60 MG tablet       PLAN:  Pap is no longer indicated  mammo done a couple of days ago and normal so UTD for a year  Had normal dexa in 2017 so would repeat in another 1-2 yrs    Fasting labs, full STD testing, and TSH today  If TSH in range will send refills on her armour thyroid for the year    Reviewed situational stressors, sleep, hormones and general depression and anxiety  Feels overall the MOOSE/depression are fine on her meds so sertraline 150mg and wellbutrin 300mg refills sent for the year    However her vivelle is at full dose and changing it regularly and still inadequate control of v.m sx  Discussed pros/cons of estratest and some of the other peripheral potential benefits of testosterone as only setting it is approved for by the FDA is inadequate control of  v.m sx  Patient would like to try it.  Rx sent in and while await insurance approval, she does have patches that she just refilled that she can use.   If not covered or needs PA or too costly can then make a different plan like oral estradiol and do 1mg or 1.5mg oral vs two patches of vivelle at same time    Patient has molluscum. Typically viral and will resolve on it's own. Since she's immune suppressed may take longer or not resolve or partially resolve  Encouraged to delay the laser hair removal so as not to make it worse and if not resolving, or simply desires it, can see derm for treatment of individual lesions    Discussed cold sores, oral to genital spread, early in first infection for her BF could be neg antibodies but if healing lesion and PCR not done, or done in healing phase, that could also be neg.  More likely is her hsv-1 transmitted to him genitally vs him having something all together different. Though could in theory have had molluscum as well and could have ulcerated if coalesced, etc    Long discussion on covid vaxx and booster and dosing and her immune suppression and how important it is to protect from severe infection, etc  Patient agreeable to it and the second shingrix. Will do tdap at a later time.    On the date of the encounter, an additional 30 minutes, on top of the annual preventative exam,were spent on reviewing imaging, lab work, and other provider notes, along with direct management of the patient's medical issues as above.      Teresa Longoria MD

## 2022-03-25 ENCOUNTER — OFFICE VISIT (OUTPATIENT)
Dept: OBGYN | Facility: CLINIC | Age: 52
End: 2022-03-25
Payer: COMMERCIAL

## 2022-03-25 VITALS
SYSTOLIC BLOOD PRESSURE: 118 MMHG | HEIGHT: 63 IN | WEIGHT: 155 LBS | DIASTOLIC BLOOD PRESSURE: 76 MMHG | BODY MASS INDEX: 27.46 KG/M2

## 2022-03-25 DIAGNOSIS — R61 NIGHT SWEATS: ICD-10-CM

## 2022-03-25 DIAGNOSIS — Z13.220 ENCOUNTER FOR LIPID SCREENING FOR CARDIOVASCULAR DISEASE: ICD-10-CM

## 2022-03-25 DIAGNOSIS — B08.1 MOLLUSCUM CONTAGIOSUM: ICD-10-CM

## 2022-03-25 DIAGNOSIS — G43.909 MIGRAINE WITHOUT STATUS MIGRAINOSUS, NOT INTRACTABLE, UNSPECIFIED MIGRAINE TYPE: ICD-10-CM

## 2022-03-25 DIAGNOSIS — Z23 NEED FOR VACCINATION: ICD-10-CM

## 2022-03-25 DIAGNOSIS — Z13.228 SCREENING FOR METABOLIC DISORDER: ICD-10-CM

## 2022-03-25 DIAGNOSIS — F41.9 ANXIETY: ICD-10-CM

## 2022-03-25 DIAGNOSIS — Z23 HIGH PRIORITY FOR 2019-NCOV VACCINE: ICD-10-CM

## 2022-03-25 DIAGNOSIS — Z11.8 SCREENING FOR CHLAMYDIAL DISEASE: ICD-10-CM

## 2022-03-25 DIAGNOSIS — Z13.6 ENCOUNTER FOR LIPID SCREENING FOR CARDIOVASCULAR DISEASE: ICD-10-CM

## 2022-03-25 DIAGNOSIS — Z11.3 SCREEN FOR STD (SEXUALLY TRANSMITTED DISEASE): ICD-10-CM

## 2022-03-25 DIAGNOSIS — F33.40 RECURRENT MAJOR DEPRESSION IN REMISSION (H): ICD-10-CM

## 2022-03-25 DIAGNOSIS — E03.9 ACQUIRED HYPOTHYROIDISM: ICD-10-CM

## 2022-03-25 DIAGNOSIS — B00.1 HERPES LABIALIS: ICD-10-CM

## 2022-03-25 DIAGNOSIS — Z01.419 ENCOUNTER FOR GYNECOLOGICAL EXAMINATION WITHOUT ABNORMAL FINDING: Primary | ICD-10-CM

## 2022-03-25 LAB — HIV 1+2 AB+HIV1 P24 AG SERPL QL IA: NONREACTIVE

## 2022-03-25 PROCEDURE — 90471 IMMUNIZATION ADMIN: CPT | Performed by: OBSTETRICS & GYNECOLOGY

## 2022-03-25 PROCEDURE — 84443 ASSAY THYROID STIM HORMONE: CPT | Performed by: OBSTETRICS & GYNECOLOGY

## 2022-03-25 PROCEDURE — 80053 COMPREHEN METABOLIC PANEL: CPT | Performed by: OBSTETRICS & GYNECOLOGY

## 2022-03-25 PROCEDURE — 87591 N.GONORRHOEAE DNA AMP PROB: CPT | Performed by: OBSTETRICS & GYNECOLOGY

## 2022-03-25 PROCEDURE — 99396 PREV VISIT EST AGE 40-64: CPT | Mod: 25 | Performed by: OBSTETRICS & GYNECOLOGY

## 2022-03-25 PROCEDURE — 80061 LIPID PANEL: CPT | Performed by: OBSTETRICS & GYNECOLOGY

## 2022-03-25 PROCEDURE — 86695 HERPES SIMPLEX TYPE 1 TEST: CPT | Performed by: OBSTETRICS & GYNECOLOGY

## 2022-03-25 PROCEDURE — 90750 HZV VACC RECOMBINANT IM: CPT | Performed by: OBSTETRICS & GYNECOLOGY

## 2022-03-25 PROCEDURE — 0064A COVID-19,PF,MODERNA (18+ YRS BOOSTER .25ML): CPT | Performed by: OBSTETRICS & GYNECOLOGY

## 2022-03-25 PROCEDURE — 87389 HIV-1 AG W/HIV-1&-2 AB AG IA: CPT | Performed by: OBSTETRICS & GYNECOLOGY

## 2022-03-25 PROCEDURE — 36415 COLL VENOUS BLD VENIPUNCTURE: CPT | Performed by: OBSTETRICS & GYNECOLOGY

## 2022-03-25 PROCEDURE — 91306 COVID-19,PF,MODERNA (18+ YRS BOOSTER .25ML): CPT | Performed by: OBSTETRICS & GYNECOLOGY

## 2022-03-25 PROCEDURE — 86696 HERPES SIMPLEX TYPE 2 TEST: CPT | Performed by: OBSTETRICS & GYNECOLOGY

## 2022-03-25 PROCEDURE — 99214 OFFICE O/P EST MOD 30 MIN: CPT | Mod: 25 | Performed by: OBSTETRICS & GYNECOLOGY

## 2022-03-25 PROCEDURE — 86780 TREPONEMA PALLIDUM: CPT | Performed by: OBSTETRICS & GYNECOLOGY

## 2022-03-25 PROCEDURE — 87491 CHLMYD TRACH DNA AMP PROBE: CPT | Performed by: OBSTETRICS & GYNECOLOGY

## 2022-03-25 RX ORDER — SERTRALINE HYDROCHLORIDE 100 MG/1
150 TABLET, FILM COATED ORAL DAILY
Qty: 135 TABLET | Refills: 3 | Status: SHIPPED | OUTPATIENT
Start: 2022-03-25 | End: 2022-12-14

## 2022-03-25 RX ORDER — THYROID 60 MG/1
60 TABLET ORAL DAILY
Qty: 90 TABLET | Refills: 3 | Status: CANCELLED | OUTPATIENT
Start: 2022-03-25

## 2022-03-25 RX ORDER — ESTERIFIED ESTROGEN AND METHYLTESTOSTERONE .625; 1.25 MG/1; MG/1
1 TABLET ORAL DAILY
Qty: 90 TABLET | Refills: 3 | Status: SHIPPED | OUTPATIENT
Start: 2022-03-25 | End: 2022-07-15

## 2022-03-25 RX ORDER — VALACYCLOVIR HYDROCHLORIDE 1 G/1
TABLET, FILM COATED ORAL
Qty: 12 TABLET | Refills: 3 | Status: SHIPPED | OUTPATIENT
Start: 2022-03-25 | End: 2023-03-10

## 2022-03-25 RX ORDER — SUMATRIPTAN 100 MG/1
100 TABLET, FILM COATED ORAL
Qty: 18 TABLET | Refills: 3 | Status: SHIPPED | OUTPATIENT
Start: 2022-03-25 | End: 2023-07-14

## 2022-03-25 RX ORDER — BUPROPION HYDROCHLORIDE 300 MG/1
300 TABLET ORAL EVERY MORNING
Qty: 90 TABLET | Refills: 3 | Status: SHIPPED | OUTPATIENT
Start: 2022-03-25 | End: 2023-03-14

## 2022-03-25 RX ORDER — ESTRADIOL 0.1 MG/D
FILM, EXTENDED RELEASE TRANSDERMAL
Qty: 24 PATCH | Refills: 3 | Status: CANCELLED | OUTPATIENT
Start: 2022-03-25

## 2022-03-25 ASSESSMENT — ANXIETY QUESTIONNAIRES
IF YOU CHECKED OFF ANY PROBLEMS ON THIS QUESTIONNAIRE, HOW DIFFICULT HAVE THESE PROBLEMS MADE IT FOR YOU TO DO YOUR WORK, TAKE CARE OF THINGS AT HOME, OR GET ALONG WITH OTHER PEOPLE: NOT DIFFICULT AT ALL
2. NOT BEING ABLE TO STOP OR CONTROL WORRYING: NOT AT ALL
5. BEING SO RESTLESS THAT IT IS HARD TO SIT STILL: NOT AT ALL
3. WORRYING TOO MUCH ABOUT DIFFERENT THINGS: NOT AT ALL
GAD7 TOTAL SCORE: 0
7. FEELING AFRAID AS IF SOMETHING AWFUL MIGHT HAPPEN: NOT AT ALL
6. BECOMING EASILY ANNOYED OR IRRITABLE: NOT AT ALL
1. FEELING NERVOUS, ANXIOUS, OR ON EDGE: NOT AT ALL

## 2022-03-25 ASSESSMENT — PATIENT HEALTH QUESTIONNAIRE - PHQ9
5. POOR APPETITE OR OVEREATING: NOT AT ALL
SUM OF ALL RESPONSES TO PHQ QUESTIONS 1-9: 3

## 2022-03-25 NOTE — NURSING NOTE
Prior to immunization administration, verified patients identity using patient s name and date of birth. Please see Immunization Activity for additional information.     Screening Questionnaire for Adult Immunization    Are you sick today?   No   Do you have allergies to medications, food, a vaccine component or latex?   Yes - Pencillins   Have you ever had a serious reaction after receiving a vaccination?   No   Do you have a long-term health problem with heart, lung, kidney, or metabolic disease (e.g., diabetes), asthma, a blood disorder, no spleen, complement component deficiency, a cochlear implant, or a spinal fluid leak?  Are you on long-term aspirin therapy?   No   Do you have cancer, leukemia, HIV/AIDS, or any other immune system problem?   No   Do you have a parent, brother, or sister with an immune system problem?   Yes   In the past 3 months, have you taken medications that affect  your immune system, such as prednisone, other steroids, or anticancer drugs; drugs for the treatment of rheumatoid arthritis, Crohn s disease, or psoriasis; or have you had radiation treatments?   Yes   Have you had a seizure, or a brain or other nervous system problem?   No   During the past year, have you received a transfusion of blood or blood    products, or been given immune (gamma) globulin or antiviral drug?   No   For women: Are you pregnant or is there a chance you could become       pregnant during the next month?   No   Have you received any vaccinations in the past 4 weeks?   No     Immunization questionnaire was positive for at least one answer.  Notified Dr. Longoria.        Per orders of Dr. Longoria, injection of Shingrix given by Wilda Fraser LPN. Patient instructed to remain in clinic for 15 minutes afterwards, and to report any adverse reaction to me immediately.       Screening performed by Wilda Fraser LPN on 3/25/2022 at 12:17 PM.

## 2022-03-26 PROBLEM — F33.40 RECURRENT MAJOR DEPRESSION IN REMISSION (H): Status: ACTIVE | Noted: 2022-03-26

## 2022-03-26 LAB
ALBUMIN SERPL-MCNC: 4.1 G/DL (ref 3.4–5)
ALP SERPL-CCNC: 72 U/L (ref 40–150)
ALT SERPL W P-5'-P-CCNC: 33 U/L (ref 0–50)
ANION GAP SERPL CALCULATED.3IONS-SCNC: 4 MMOL/L (ref 3–14)
AST SERPL W P-5'-P-CCNC: 20 U/L (ref 0–45)
BILIRUB SERPL-MCNC: 0.4 MG/DL (ref 0.2–1.3)
BUN SERPL-MCNC: 11 MG/DL (ref 7–30)
C TRACH DNA SPEC QL NAA+PROBE: NEGATIVE
CALCIUM SERPL-MCNC: 9.2 MG/DL (ref 8.5–10.1)
CHLORIDE BLD-SCNC: 106 MMOL/L (ref 94–109)
CHOLEST SERPL-MCNC: 237 MG/DL
CO2 SERPL-SCNC: 26 MMOL/L (ref 20–32)
CREAT SERPL-MCNC: 1.03 MG/DL (ref 0.52–1.04)
FASTING STATUS PATIENT QL REPORTED: YES
GFR SERPL CREATININE-BSD FRML MDRD: 65 ML/MIN/1.73M2
GLUCOSE BLD-MCNC: 82 MG/DL (ref 70–99)
HDLC SERPL-MCNC: 78 MG/DL
LDLC SERPL CALC-MCNC: 143 MG/DL
N GONORRHOEA DNA SPEC QL NAA+PROBE: NEGATIVE
NONHDLC SERPL-MCNC: 159 MG/DL
POTASSIUM BLD-SCNC: 4 MMOL/L (ref 3.4–5.3)
PROT SERPL-MCNC: 7.4 G/DL (ref 6.8–8.8)
SODIUM SERPL-SCNC: 136 MMOL/L (ref 133–144)
T PALLIDUM AB SER QL: NONREACTIVE
TRIGL SERPL-MCNC: 81 MG/DL
TSH SERPL DL<=0.005 MIU/L-ACNC: 1.26 MU/L (ref 0.4–4)

## 2022-03-26 RX ORDER — THYROID 60 MG/1
60 TABLET ORAL DAILY
Qty: 90 TABLET | Refills: 3 | Status: SHIPPED | OUTPATIENT
Start: 2022-03-26 | End: 2023-04-19

## 2022-03-26 ASSESSMENT — ANXIETY QUESTIONNAIRES: GAD7 TOTAL SCORE: 0

## 2022-03-28 LAB
HSV1 IGG SERPL QL IA: 16.8 INDEX
HSV1 IGG SERPL QL IA: ABNORMAL
HSV2 IGG SERPL QL IA: 0.23 INDEX
HSV2 IGG SERPL QL IA: ABNORMAL

## 2022-03-29 NOTE — RESULT ENCOUNTER NOTE
Alvarado,    Your STD testing is all negative other than the type 1 herpes that we know is the cold sores you get. You are negative for HSV-2 which is typically genital herpes. I do think that at some point back in 2013 you did test positive for HSV-2 but then it was repeated several times again and has been negative ever since. So likely the type 1 is maybe cross reacting with the type 2 test back then. HSV-1 can still be transmitted oral to genital and it's possible that is what happened with your boyfriend but everything else is normal/negative.    Your cholesterol is a little elevated. The LDL, or bad cholesterol, should be under 130 and not 100 as shown (as long as you don't have any other heart disease risk factors like hypertension or diabetes), and your's is 143 and has always been normal. The good cholesterol or HDL, is really good though at 78 which helps offset the LDL some. Hopefully with you refocusing on exercise and healthier eating and all those things it will improve. We should just check it again next year.    All the other labs are normal and your TSH is great so I'll refill the [place] thyroid at 60mg.    Teresa Longoria MD

## 2022-05-01 DIAGNOSIS — R61 NIGHT SWEATS: ICD-10-CM

## 2022-05-02 RX ORDER — ESTRADIOL 0.1 MG/D
PATCH, EXTENDED RELEASE TRANSDERMAL
Qty: 24 PATCH | Refills: 3 | OUTPATIENT
Start: 2022-05-02

## 2022-05-02 NOTE — TELEPHONE ENCOUNTER
"Requested Prescriptions   Pending Prescriptions Disp Refills     VIVELLE-DOT 0.1 MG/24HR bi-weekly patch [Pharmacy Med Name: VIVELLE-DOT PATCH 0.1MG] 24 patch 3     Sig: APPLY 1 PATCH TOPICALLY TO  SKIN TWICE WEEKLY       Hormone Replacement Therapy Passed - 5/1/2022  8:27 PM        Passed - Blood pressure under 140/90 in past 12 months     BP Readings from Last 3 Encounters:   03/25/22 118/76   10/14/21 110/73   09/16/21 114/77                 Passed - Recent (12 mo) or future (30 days) visit within the authorizing provider's specialty     Patient has had an office visit with the authorizing provider or a provider within the authorizing providers department within the previous 12 mos or has a future within next 30 days. See \"Patient Info\" tab in inbasket, or \"Choose Columns\" in Meds & Orders section of the refill encounter.              Passed - Patient has mammogram in past 2 years on file if age 50-75        Passed - Medication is active on med list        Passed - Patient is 18 years of age or older        Passed - No active pregnancy on record        Passed - No positive pregnancy test on record in past 12 months           Last Written Prescription Date:  2/7/22  Last Fill Quantity: 24,  # refills: 0   Last office visit: 3/25/2022 with prescribing provider:  Dr Longoria   However her vivelle is at full dose and changing it regularly and still inadequate control of v.m sx  Discussed pros/cons of estratest and some of the other peripheral potential benefits of testosterone as only setting it is approved for by the FDA is inadequate control of v.m sx  Patient would like to try it.  Rx sent in and while await insurance approval, she does have patches that she just refilled that she can use.   If not covered or needs PA or too costly can then make a different plan like oral estradiol and do 1mg or 1.5mg oral vs two patches of vivelle at same time      Called pt and she is doing really well on the new med: estratest. " Discontinued the patch. She thought she did on her end but auto refill sent anyway.    Refused as discontinued.    Yajaira Noland RN on 5/2/2022 at 12:04 PM

## 2022-06-08 ENCOUNTER — TRANSFERRED RECORDS (OUTPATIENT)
Dept: HEALTH INFORMATION MANAGEMENT | Facility: CLINIC | Age: 52
End: 2022-06-08
Payer: COMMERCIAL

## 2022-09-03 ENCOUNTER — HEALTH MAINTENANCE LETTER (OUTPATIENT)
Age: 52
End: 2022-09-03

## 2022-12-14 DIAGNOSIS — F41.9 ANXIETY: ICD-10-CM

## 2022-12-14 DIAGNOSIS — F33.40 RECURRENT MAJOR DEPRESSION IN REMISSION (H): ICD-10-CM

## 2022-12-14 RX ORDER — SERTRALINE HYDROCHLORIDE 100 MG/1
TABLET, FILM COATED ORAL
Qty: 135 TABLET | Refills: 0 | Status: SHIPPED | OUTPATIENT
Start: 2022-12-14 | End: 2023-03-14

## 2022-12-14 NOTE — TELEPHONE ENCOUNTER
"Requested Prescriptions   Pending Prescriptions Disp Refills     sertraline (ZOLOFT) 100 MG tablet [Pharmacy Med Name: Sertraline HCl 100 MG Oral Tablet] 135 tablet 3     Sig: TAKE 1 AND 1/2 TABLETS BY  MOUTH DAILY       SSRIs Protocol Failed - 12/14/2022  3:25 PM        Failed - PHQ-9 score less than 5 in past 6 months     Please review last PHQ-9 score.           Failed - Recent (6 mo) or future (30 days) visit within the authorizing provider's specialty     Patient had office visit in the last 6 months or has a visit in the next 30 days with authorizing provider or within the authorizing provider's specialty.  See \"Patient Info\" tab in inbasket, or \"Choose Columns\" in Meds & Orders section of the refill encounter.            Passed - Medication is active on med list        Passed - Patient is age 18 or older        Passed - No active pregnancy on record        Passed - No positive pregnancy test in last 12 months           PHQ 3/5/2021 9/16/2021 3/25/2022   PHQ-9 Total Score 4 3 3   Q9: Thoughts of better off dead/self-harm past 2 weeks Not at all Not at all Not at all       Last Written Prescription Date:  3/25/22  Last Fill Quantity: 135, 3 refills  Last office visit: 3/25/22      Prescription approved per [facility] Refill Protocol.    Belgica Sanchez RN    "

## 2023-03-10 DIAGNOSIS — B00.1 HERPES LABIALIS: ICD-10-CM

## 2023-03-10 RX ORDER — VALACYCLOVIR HYDROCHLORIDE 1 G/1
TABLET, FILM COATED ORAL
Qty: 12 TABLET | Refills: 0 | Status: SHIPPED | OUTPATIENT
Start: 2023-03-10 | End: 2023-05-23

## 2023-03-10 NOTE — TELEPHONE ENCOUNTER
"Requested Prescriptions   Pending Prescriptions Disp Refills     valACYclovir (VALTREX) 1000 mg tablet [Pharmacy Med Name: valACYclovir HCl 1 GM Oral Tablet] 12 tablet 3     Sig: TAKE 2 TABLETS BY MOUTH  TWICE DAILY AS NEEDED FOR  OUTBREAKS       Antivirals for Herpes Protocol Passed - 3/10/2023  3:59 AM        Passed - Patient is age 12 or older        Passed - Recent (12 mo) or future (30 days) visit within the authorizing provider's specialty     Patient has had an office visit with the authorizing provider or a provider within the authorizing providers department within the previous 12 mos or has a future within next 30 days. See \"Patient Info\" tab in inbasket, or \"Choose Columns\" in Meds & Orders section of the refill encounter.              Passed - Medication is active on med list        Passed - Normal serum creatinine on file in past 12 months     Recent Labs   Lab Test 03/25/22  1209   CR 1.03       Ok to refill medication if creatinine is low             Last Written Prescription Date:  3/25/22  Last Fill Quantity: 12,  # refills: 3   Last office visit: 3/25/2022 with prescribing provider:  Dr Longoria   Future Office Visit:    Medication is being filled for 1 time refill only due to:  Patient needs to be seen because it has been more than one year since last visit.  Yajaira Noland RN on 3/10/2023 at 9:49 AM      "

## 2023-03-13 NOTE — PROGRESS NOTES
SUBJECTIVE:                                                   Shelby Asencio is a 52 year old female who presents to clinic today for the following health issue(s):  Patient presents with:  Breast Pain      Additional information: Pt c/o reports breast pain. Pt reports that left breast pain started a month ago  With tenderness.     HPI:  Alvarado presents to clinic with a one month history of left breast pain. Notes sensitivity and sharper pain around nipple and a deeper ache on the top half of her breast. Hasn't noted any lumps, masses, skin changes, or nipple discharge. No new exercise regimens or bras. Drinks 1-2 cups of coffee daily. No family history of breast or gyn malignancies.     Patient's last menstrual period was 2019 (lmp unknown)..     Patient is sexually active, .  Using hysterectomy for contraception.   STD testing offered?  Declined   reports that she has never smoked. She has never used smokeless tobacco.    Health maintenance updated:  no  Overdue  Never   Done Pneumococcal Vaccine: Pediatrics (0 to 5 Years) and At-Risk Patients (6 to 64 Years) (1 - PCV)   MAY 25   2011 HEPATITIS B IMMUNIZATION (2 of 3 - 19+ 3-dose series)  Last completed:  DTAP/TDAP/TD IMMUNIZATION (3 - Td or Tdap)  Last completed:  COVID-19 Vaccine (3 - Moderna risk series)  Last completed: Mar 25, 2022   SEP 25   2022 PHQ-9 (Every 6 Months)  Last completed: Mar 25, 2022   MAR 4   2023 PAP FOLLOW-UP (Once)  Last completed: Mar 4, 2020   MAR 4   2023 HPV FOLLOW-UP (Once)  Last completed: Mar 4, 2020   Due Soon  MAR 22   2023 MAMMO SCREENING (Yearly)  Last completed: Mar 22, 2022   MAR 25   2023 YEARLY PREVENTIVE VISIT (Yearly)  Last completed: Mar 25, 2022   MAR 25   2023 TSH W/FREE T4 REFLEX (Yearly)  Last completed: Mar 25, 2022   Upcoming  2026 ADVANCE CARE PLANNING (Every 5 Years)  Last completed: 2021   MAR 25   2027 LIPID (Every 5  Years)  Last completed: Mar 25, 2022   DEC 9   2029 COLORECTAL CANCER SCREENING (COLONOSCOPY - Preferred) (Every 10 Years)  Last completed: Dec 9, 2019     Today's PHQ-2 Score:   PHQ-2 ( 1999 Pfizer) 3/14/2023   Q1: Little interest or pleasure in doing things 0   Q2: Feeling down, depressed or hopeless 0   PHQ-2 Score 0   PHQ-2 Total Score (12-17 Years)- Positive if 3 or more points; Administer PHQ-A if positive -   Q1: Little interest or pleasure in doing things -   Q2: Feeling down, depressed or hopeless -   PHQ-2 Score -     Today's PHQ-9 Score:   PHQ-9 SCORE 3/14/2023   PHQ-9 Total Score -   PHQ-9 Total Score 0     Today's MOOSE-7 Score:   MOOSE-7 SCORE 3/14/2023   Total Score 0       Problem list and histories reviewed & adjusted, as indicated.  Additional history: as documented.    Patient Active Problem List   Diagnosis     Acquired hypothyroidism     CARDIOVASCULAR SCREENING; LDL GOAL LESS THAN 160     Cluster headache     Anxiety     Mild major depression (H)     Dizziness     Low back pain     Hyperhidrosis of soles     AS (ankylosing spondylitis) (H)     Herpes labialis     Major depression     Sjogren's syndrome (H)     TMJ (temporomandibular joint syndrome)     Herpes zoster     OA (osteoarthritis)     Post-menopause on HRT (hormone replacement therapy)     Symptoms, such as flushing, sleeplessness, headache, lack of concentration, associated with the menopause     Cervical high risk HPV (human papillomavirus) test positive     DUB (dysfunctional uterine bleeding)     Endometrial polyp     Intramural leiomyoma of uterus     Cervical high risk human papillomavirus (HPV) DNA test positive     Migraine without status migrainosus, not intractable, unspecified migraine type     Recurrent major depression in remission (H)     Past Surgical History:   Procedure Laterality Date     ABDOMEN SURGERY  12/2020     COLONOSCOPY N/A 12/9/2019    Procedure: COLONOSCOPY;  Surgeon: Gem Elder MD;  Location:  GI      CYSTOSCOPY N/A 12/8/2020    Procedure: CYSTOSCOPY;  Surgeon: Teresa Longoria MD;  Location:  OR      HYSTEROS W PERMANENT FALLOPAIN IMPLANT  8/30/2010    By Dr. Longoria     HC HYSTEROSALPINGOGRAM  12/28/10    post essure bilateral tubal occlusion     LAPAROSCOPIC HYSTERECTOMY TOTAL, BILATERAL SALPINGO-OOPHORECTOMY, COMBINED N/A 12/8/2020    Procedure: TOTAL LAPAROSCOPIC HYSTERECTOMY, BILATERAL SALPINGO OOPHORECTOMY,;  Surgeon: Teresa Longoria MD;  Location: [facility] OR     MAMMOPLASTY AUGMENTATION  2008     ROTATOR CUFF REPAIR RT/LT  4/12      Social History     Tobacco Use     Smoking status: Never     Smokeless tobacco: Never   Substance Use Topics     Alcohol use: Yes     Alcohol/week: 0.0 standard drinks     Comment: OCCASSIONAL      Problem (# of Occurrences) Relation (Name,Age of Onset)    Substance Abuse (2) Brother (Marko), Brother (Hal): alcohol    Cancer (2) Brother (Marko): panc ca, Sister: skin cancer    Diabetes (1) Maternal Grandfather    Hypertension (2) Father (Guilherme), Mother (Jacqueline)    Thyroid Disease (2) Maternal Grandfather, Maternal Grandmother (Patito)    Other Cancer (2) Father (Guilherme): Skin, Brother (Hal): Pancreatic    Coronary Artery Disease (1) Father (Guilherme): mi, PVD     Rheumatoid Arthritis (2) Father (Guilherme), Sister: passed away            Current Outpatient Medications   Medication Sig     adalimumab (HUMIRA) 40 MG/0.8ML prefilled syringe kit Inject 40 mg Subcutaneous every 14 days      cevimeline (EVOXAC) 30 MG capsule Take 30 mg by mouth 3 times daily      Cholecalciferol (VITAMIN D PO) Take 1 tablet by mouth daily      cyclobenzaprine (FLEXERIL) 10 MG tablet Take 0.5-1 tablets (5-10 mg) by mouth 2 times daily as needed for muscle spasms     EST ESTROGENS-METHYLTEST HS 0.625-1.25 MG per tablet TAKE 1 TABLET BY MOUTH  DAILY     folic acid (FOLVITE) 1 MG tablet Take 1 mg by mouth daily     methotrexate 2.5 MG tablet Take 15 mg by mouth every 7 days On Sundays (takes 6 x 2.5mg)      "omeprazole 20 MG tablet Take 20 mg by mouth daily     SUMAtriptan (IMITREX) 100 MG tablet Take 1 tablet (100 mg) by mouth at onset of headache for migraine     thyroid (ARMOUR THYROID) 60 MG tablet Take 1 tablet (60 mg) by mouth daily     valACYclovir (VALTREX) 1000 mg tablet TAKE 2 TABLETS BY MOUTH  TWICE DAILY AS NEEDED FOR  OUTBREAKS     Saccharomyces boulardii (PROBIOTIC) 250 MG CAPS  (Patient not taking: Reported on 3/14/2023)     No current facility-administered medications for this visit.     Allergies   Allergen Reactions     Penicillins Rash       ROS:  Breast: Tenderness and and pain      No urinary frequency or dysuria, bladder or kidney problems      OBJECTIVE:     /80   Ht 1.607 m (5' 3.25\")   Wt 71.8 kg (158 lb 3.2 oz)   LMP 01/01/2019 (LMP Unknown)   Breastfeeding No   BMI 27.80 kg/m    Body mass index is 27.8 kg/m .    Exam:  Constitutional:  Appearance: Well nourished, well developed alert, in no acute distress  Breasts:  Inspection of Breasts:  Symmetric bilaterally.  No puckering.  No skin changes. Tenderness noted directly over nipple and in tissue up to 3 cm above nipple at 11-3 oclock position. Palpation of Breasts and Axillae:  No masses present on palpation, no breast tenderness Axillary Lymph Nodes:  No lymphadenopathy present. Implants present bilaterally  Neurologic:  Mental Status:  Oriented X3.  Normal strength and tone, sensory exam grossly normal, mentation intact and speech normal.    Psychiatric:  Mentation appears normal and affect normal/bright.     In-Clinic Test Results:  No results found for this or any previous visit (from the past 24 hour(s)).    ASSESSMENT/PLAN:                                                        ICD-10-CM    1. Breast pain  N64.4 MA Diagnostic Digital Bilateral     US Breast Left Limited 1-3 Quadrants          There are no Patient Instructions on file for this visit.    No obvious concerns on exam. Referral placed to breast center for mammogram " and US of L breast.  Follow up as needed.    I, Veronica Medina, completed the PFSH and ROS. I then acted as a scribe for Yajaira VERGARA CNP for the remainder of the visit.  Veronica Medina BSN, RN  [facility] DNP, [facility] student    I was present with the student who participated in the service and in the documentation of the note. I have verified the history and personally performed the physical exam and medical decision-making. I agree with the assessment and plan of care as documented in the note.      URSULA Mccarthy CNP  [facility] [place]

## 2023-03-14 ENCOUNTER — OFFICE VISIT (OUTPATIENT)
Dept: OBGYN | Facility: CLINIC | Age: 53
End: 2023-03-14
Payer: COMMERCIAL

## 2023-03-14 VITALS
SYSTOLIC BLOOD PRESSURE: 120 MMHG | BODY MASS INDEX: 28.03 KG/M2 | HEIGHT: 63 IN | WEIGHT: 158.2 LBS | DIASTOLIC BLOOD PRESSURE: 80 MMHG

## 2023-03-14 DIAGNOSIS — N64.4 BREAST PAIN: Primary | ICD-10-CM

## 2023-03-14 PROCEDURE — 99213 OFFICE O/P EST LOW 20 MIN: CPT | Performed by: NURSE PRACTITIONER

## 2023-03-14 ASSESSMENT — ANXIETY QUESTIONNAIRES
5. BEING SO RESTLESS THAT IT IS HARD TO SIT STILL: NOT AT ALL
IF YOU CHECKED OFF ANY PROBLEMS ON THIS QUESTIONNAIRE, HOW DIFFICULT HAVE THESE PROBLEMS MADE IT FOR YOU TO DO YOUR WORK, TAKE CARE OF THINGS AT HOME, OR GET ALONG WITH OTHER PEOPLE: NOT DIFFICULT AT ALL
3. WORRYING TOO MUCH ABOUT DIFFERENT THINGS: NOT AT ALL
GAD7 TOTAL SCORE: 0
6. BECOMING EASILY ANNOYED OR IRRITABLE: NOT AT ALL
7. FEELING AFRAID AS IF SOMETHING AWFUL MIGHT HAPPEN: NOT AT ALL
2. NOT BEING ABLE TO STOP OR CONTROL WORRYING: NOT AT ALL
GAD7 TOTAL SCORE: 0
1. FEELING NERVOUS, ANXIOUS, OR ON EDGE: NOT AT ALL

## 2023-03-14 ASSESSMENT — PATIENT HEALTH QUESTIONNAIRE - PHQ9
5. POOR APPETITE OR OVEREATING: NOT AT ALL
SUM OF ALL RESPONSES TO PHQ QUESTIONS 1-9: 0

## 2023-03-16 DIAGNOSIS — R61 NIGHT SWEATS: ICD-10-CM

## 2023-03-17 ENCOUNTER — HOSPITAL ENCOUNTER (OUTPATIENT)
Dept: MAMMOGRAPHY | Facility: CLINIC | Age: 53
Discharge: HOME | End: 2023-03-17
Attending: NURSE PRACTITIONER
Payer: COMMERCIAL

## 2023-03-17 DIAGNOSIS — N64.4 BREAST PAIN: ICD-10-CM

## 2023-03-17 PROCEDURE — G0279 TOMOSYNTHESIS, MAMMO: HCPCS

## 2023-03-17 PROCEDURE — 76642 ULTRASOUND BREAST LIMITED: CPT | Mod: LT

## 2023-03-17 RX ORDER — ESTERIFIED ESTROGENS AND METHYLTESTOSTERONE .625; 1.25 MG/1; MG/1
TABLET ORAL
Qty: 90 TABLET | Refills: 0 | Status: SHIPPED | OUTPATIENT
Start: 2023-03-17 | End: 2023-06-20

## 2023-03-17 NOTE — TELEPHONE ENCOUNTER
Requested Prescriptions   Pending Prescriptions Disp Refills     EST ESTROGENS-METHYLTEST HS 0.625-1.25 MG per tablet [Pharmacy Med Name: EST  1.25MG TABLET  ESTRO MTEST .625] 90 tablet      Sig: TAKE 1 TABLET BY MOUTH  DAILY       There is no refill protocol information for this order        Last Written Prescription Date:  7/15/22  Last Fill Quantity: #90, 1 refill  Last office visit: 3/14/23      Routing refill request to provider for review/approval because:  Drug not on the [facility] [place] [facility]     Belgica Sanchez RN

## 2023-03-23 DIAGNOSIS — F33.40 RECURRENT MAJOR DEPRESSION IN REMISSION (H): ICD-10-CM

## 2023-03-24 RX ORDER — BUPROPION HYDROCHLORIDE 300 MG/1
TABLET ORAL
Qty: 90 TABLET | Refills: 0 | Status: SHIPPED | OUTPATIENT
Start: 2023-03-24 | End: 2023-07-14

## 2023-03-24 NOTE — TELEPHONE ENCOUNTER
"Requested Prescriptions   Pending Prescriptions Disp Refills     buPROPion (WELLBUTRIN XL) 300 MG 24 hr tablet [Pharmacy Med Name: buPROPion HCl ER (XL) 300 MG Oral Tablet Extended Release 24 Hour] 90 tablet 3     Sig: TAKE 1 TABLET BY MOUTH IN  THE MORNING       SSRIs Protocol Failed - 3/23/2023 10:00 PM        Failed - Medication is active on med list        Passed - PHQ-9 score less than 5 in past 6 months     Please review last PHQ-9 score.           Passed - Medication is Bupropion     If the medication is Bupropion (Wellbutrin), and the patient is taking for smoking cessation; OK to refill.          Passed - Patient is age 18 or older        Passed - No active pregnancy on record        Passed - No positive pregnancy test in last 12 months        Passed - Recent (6 mo) or future (30 days) visit within the authorizing provider's specialty     Patient had office visit in the last 6 months or has a visit in the next 30 days with authorizing provider or within the authorizing provider's specialty.  See \"Patient Info\" tab in inbasket, or \"Choose Columns\" in Meds & Orders section of the refill encounter.               OV 3/25/22  Reviewed situational stressors, sleep, hormones and general depression and anxiety  Feels overall the MOOSE/depression are fine on her meds so sertraline 150mg and wellbutrin 300mg refills sent for the year    Next 5 appointments (look out 90 days)    May 01, 2023  2:00 PM  PHYSICAL with Teresa Longoria MD  [facility] [place] ([facility] [place] ) [address] [place] [id number] [phone]        Cannot refill for a year until OV  Radha De Leon RN on 3/24/2023 at 8:14 AM    "

## 2023-04-04 DIAGNOSIS — F33.40 RECURRENT MAJOR DEPRESSION IN REMISSION (H): ICD-10-CM

## 2023-04-04 DIAGNOSIS — F41.9 ANXIETY: ICD-10-CM

## 2023-04-04 RX ORDER — SERTRALINE HYDROCHLORIDE 100 MG/1
TABLET, FILM COATED ORAL
Qty: 135 TABLET | Refills: 0 | Status: SHIPPED | OUTPATIENT
Start: 2023-04-04 | End: 2023-07-14

## 2023-04-04 NOTE — TELEPHONE ENCOUNTER
"Requested Prescriptions   Pending Prescriptions Disp Refills     sertraline (ZOLOFT) 100 MG tablet [Pharmacy Med Name: Sertraline HCl 100 MG Oral Tablet] 135 tablet 3     Sig: TAKE 1 AND 1/2 TABLETS BY MOUTH  DAILY       SSRIs Protocol Failed - 4/4/2023  5:12 AM        Failed - Medication is active on med list        Passed - PHQ-9 score less than 5 in past 6 months     Please review last PHQ-9 score.           Passed - Patient is age 18 or older        Passed - No active pregnancy on record        Passed - No positive pregnancy test in last 12 months        Passed - Recent (6 mo) or future (30 days) visit within the authorizing provider's specialty     Patient had office visit in the last 6 months or has a visit in the next 30 days with authorizing provider or within the authorizing provider's specialty.  See \"Patient Info\" tab in inbasket, or \"Choose Columns\" in Meds & Orders section of the refill encounter.               Last Written Prescription Date:  3/25/22  Last Fill Quantity: 135,  # refills: 3   Last office visit: 3/14/2023 ; last virtual visit: Visit date not found with prescribing provider:  Von   Future Office Visit:   Next 5 appointments (look out 90 days)    May 01, 2023  2:00 PM  PHYSICAL with Teresa Longoria MD  [facility] [place] ([facility] ) [address] [place] [id number] [phone]         Medication is being filled for 1 time refill only due to:  Patient needs to be seen because due for annual.  Appointment scheduled.  Cara Lowry RN on 4/4/2023 at 7:03 AM        "

## 2023-04-18 DIAGNOSIS — E03.9 ACQUIRED HYPOTHYROIDISM: ICD-10-CM

## 2023-04-19 RX ORDER — THYROID 60 MG
TABLET ORAL
Qty: 90 TABLET | Refills: 0 | Status: SHIPPED | OUTPATIENT
Start: 2023-04-19 | End: 2023-07-13

## 2023-04-19 NOTE — TELEPHONE ENCOUNTER
"Requested Prescriptions   Pending Prescriptions Disp Refills     CHAD THYROID 60 MG tablet [Pharmacy Med Name: [place] Thyroid 60 MG Oral Tablet] 90 tablet 3     Sig: TAKE 1 TABLET BY MOUTH  DAILY       Thyroid Protocol Failed - 4/18/2023 10:19 PM        Failed - Normal TSH on file in past 12 months     Recent Labs   Lab Test 03/25/22  1209   TSH 1.26              Passed - Patient is 12 years or older        Passed - Recent (12 mo) or future (30 days) visit within the authorizing provider's specialty     Patient has had an office visit with the authorizing provider or a provider within the authorizing providers department within the previous 12 mos or has a future within next 30 days. See \"Patient Info\" tab in inbasket, or \"Choose Columns\" in Meds & Orders section of the refill encounter.              Passed - Medication is active on med list        Passed - No active pregnancy on record     If patient is pregnant or has had a positive pregnancy test, please check TSH.          Passed - No positive pregnancy test in past 12 months     If patient is pregnant or has had a positive pregnancy test, please check TSH.             Last Written Prescription Date:  3/26/22  Last Fill Quantity: 90,  # refills: 3   Last office visit: 3/14/2023 ; last virtual visit: Visit date not found with prescribing provider:  Von   Future Office Visit:   Next 5 appointments (look out 90 days)    May 01, 2023  2:00 PM  PHYSICAL with Teresa Longoria MD  [facility] [place] ([facility] [place] ) [address] [place] [phone]         Medication is being filled for 1 time refill only due to:  Patient needs to be seen because it has been more than one year since last visit.  Appointment scheduled  Cara Lowry, RN on 4/19/2023 at 5:57 AM          "

## 2023-04-29 ENCOUNTER — HEALTH MAINTENANCE LETTER (OUTPATIENT)
Age: 53
End: 2023-04-29

## 2023-05-23 DIAGNOSIS — B00.1 HERPES LABIALIS: ICD-10-CM

## 2023-05-23 RX ORDER — VALACYCLOVIR HYDROCHLORIDE 1 G/1
TABLET, FILM COATED ORAL
Qty: 12 TABLET | Refills: 0 | Status: SHIPPED | OUTPATIENT
Start: 2023-05-23 | End: 2023-07-14

## 2023-05-23 NOTE — TELEPHONE ENCOUNTER
"Requested Prescriptions   Pending Prescriptions Disp Refills     valACYclovir (VALTREX) 1000 mg tablet [Pharmacy Med Name: valACYclovir HCl 1 GM Oral Tablet] 12 tablet 3     Sig: TAKE 2 TABLETS BY MOUTH TWICE  DAILY AS NEEDED FOR OUTBREAKS       Antivirals for Herpes Protocol Failed - 5/23/2023  5:21 AM        Failed - Normal serum creatinine on file in past 12 months     Recent Labs   Lab Test 03/25/22  1209   CR 1.03       Ok to refill medication if creatinine is low          Passed - Patient is age 12 or older        Passed - Recent (12 mo) or future (30 days) visit within the authorizing provider's specialty     Patient has had an office visit with the authorizing provider or a provider within the authorizing providers department within the previous 12 mos or has a future within next 30 days. See \"Patient Info\" tab in inbasket, or \"Choose Columns\" in Meds & Orders section of the refill encounter.              Passed - Medication is active on med list           Last Written Prescription Date:  3/10/23  Last Fill Quantity: 12,  # refills: 0   Last office visit: 3/14/2023 ; last virtual visit: Visit date not found with prescribing provider:  Von   Future Office Visit:   Next 5 appointments (look out 90 days)    Jul 14, 2023  1:15 PM  PHYSICAL with Teresa Longoria MD  [facility] ([facility] ) [address] [place] [phone]         Medication is being filled for 1 time refill only due to:  Patient needs to be seen because due for annual.  Appointment scheduled.  Cara Lowry RN on 5/23/2023 at 6:26 AM          "

## 2023-06-02 ENCOUNTER — TRANSFERRED RECORDS (OUTPATIENT)
Dept: HEALTH INFORMATION MANAGEMENT | Facility: CLINIC | Age: 53
End: 2023-06-02
Payer: COMMERCIAL

## 2023-06-02 LAB
ALT SERPL-CCNC: 24 IU/L (ref 5–35)
AST SERPL-CCNC: 26 U/L (ref 5–34)
CREATININE (EXTERNAL): 0.87 MG/DL (ref 0.5–1.3)
GFR ESTIMATED (EXTERNAL): 72.1 ML/MIN/1.73M2

## 2023-06-18 DIAGNOSIS — R61 NIGHT SWEATS: ICD-10-CM

## 2023-06-18 RX ORDER — ESTERIFIED ESTROGENS AND METHYLTESTOSTERONE .625; 1.25 MG/1; MG/1
TABLET ORAL
Qty: 90 TABLET | OUTPATIENT
Start: 2023-06-18

## 2023-06-19 NOTE — TELEPHONE ENCOUNTER
Requested Prescriptions   Pending Prescriptions Disp Refills     EST ESTROGENS-METHYLTEST HS 0.625-1.25 MG per tablet [Pharmacy Med Name: EST  1.25MG TABLET  ESTRO MTEST .625] 90 tablet      Sig: TAKE 1 TABLET BY MOUTH DAILY       There is no refill protocol information for this order        Last Written Prescription Date:  3/7/23  Last Fill Quantity: 90,  # refills: 0   Last office visit: 3/14/2023 ; last virtual visit: Visit date not found with prescribing provider:  Lul   Future Office Visit:   Next 5 appointments (look out 90 days)    Jul 14, 2023  1:15 PM  PHYSICAL with Teresa Longoria MD  [facility] ([facility] ) [address] [place] [phone]           Pt due for annual, no appt scheduled. Pt already received one month extension. Rx denied.   Jenniffer Guzman RN on 6/18/2023 at 9:07 PM

## 2023-06-20 DIAGNOSIS — R61 NIGHT SWEATS: ICD-10-CM

## 2023-06-20 RX ORDER — ESTERIFIED ESTROGENS AND METHYLTESTOSTERONE .625; 1.25 MG/1; MG/1
1 TABLET ORAL DAILY
Qty: 90 TABLET | Refills: 0 | Status: SHIPPED | OUTPATIENT
Start: 2023-06-20 | End: 2023-07-14

## 2023-06-20 NOTE — TELEPHONE ENCOUNTER
Reason for call:  Medication   If this is a refill request, has the caller requested the refill from the pharmacy already? Yes  Will the patient be using a [facility]? No  Name of the pharmacy and phone number for the current request: [facility] pharmacy on file    Name of the medication requested:EST ESTROGENS-METHYLTEST HS     Other request: Pt had contacted pharmacy for refill to cover her until her annual with Dr. Longoria scheduled in July. We received refill request but denied it due to no appt made- pt had appt scheduled already (was scheduled back in April) would like enough to cover her until that appt.     Phone number to reach patient:  Cell number on file:    Telephone Information:   Mobile [phone]   Mobile [phone]       Best Time:  any    Can we leave a detailed message on this number?  YES    Travel screening: Not Applicable

## 2023-06-20 NOTE — TELEPHONE ENCOUNTER
Requested Prescriptions   Pending Prescriptions Disp Refills     estrogens-methylTESTOSTERone (EST ESTROGENS-METHYLTEST HS) 0.625-1.25 MG per tablet 90 tablet 0     Sig: Take 1 tablet by mouth daily       There is no refill protocol information for this order        Last Written Prescription Date:  3/17/23  Last Fill Quantity: 90,  # refills: 0   Last office visit: 3/14/2023 ; last virtual visit: Visit date not found with prescribing provider:  Lul   Future Office Visit:   Next 5 appointments (look out 90 days)    Jul 14, 2023  1:15 PM  PHYSICAL with Teresa Longoria MD  [facility] ([facility] ) [address] [place] [phone]         Medication is being filled for 1 time refill only due to:  Patient needs to be seen because it has been more than one year since last visit.  Appt Scheduled  Jenniffer Guzman RN on 6/20/2023 at 11:36 AM

## 2023-07-11 RX ORDER — ADALIMUMAB 40MG/0.4ML
KIT SUBCUTANEOUS
COMMUNITY
Start: 2023-05-31 | End: 2024-09-12
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
## 2023-07-13 DIAGNOSIS — E03.9 ACQUIRED HYPOTHYROIDISM: ICD-10-CM

## 2023-07-13 RX ORDER — THYROID 60 MG
TABLET ORAL
Qty: 90 TABLET | Refills: 0 | Status: SHIPPED | OUTPATIENT
Start: 2023-07-13 | End: 2023-07-16

## 2023-07-13 NOTE — TELEPHONE ENCOUNTER
"Requested Prescriptions   Pending Prescriptions Disp Refills     CHAD THYROID 60 MG tablet [Pharmacy Med Name: [place] Thyroid 60 MG Oral Tablet] 90 tablet 3     Sig: TAKE 1 TABLET BY MOUTH DAILY       Thyroid Protocol Failed - 7/13/2023  5:54 AM        Failed - Normal TSH on file in past 12 months     Recent Labs   Lab Test 03/25/22  1209   TSH 1.26              Passed - Patient is 12 years or older        Passed - Recent (12 mo) or future (30 days) visit within the authorizing provider's specialty     Patient has had an office visit with the authorizing provider or a provider within the authorizing providers department within the previous 12 mos or has a future within next 30 days. See \"Patient Info\" tab in inbasket, or \"Choose Columns\" in Meds & Orders section of the refill encounter.              Passed - Medication is active on med list        Passed - No active pregnancy on record     If patient is pregnant or has had a positive pregnancy test, please check TSH.          Passed - No positive pregnancy test in past 12 months     If patient is pregnant or has had a positive pregnancy test, please check TSH.               Last Written Prescription Date:  4/19/23  Last Fill Quantity: 90,  # refills: 0   Last office visit: 3/14/2023 ; last virtual visit: Visit date not found with prescribing provider:  Von   Future Office Visit:   Next 5 appointments (look out 90 days)    Jul 14, 2023  1:15 PM  PHYSICAL with Teresa Longoria MD  [facility] [place] ([facility] ) [address] [place] [id number] [phone]         Medication is being filled for 1 time refill only due to:  Patient needs to be seen because due for annual.  Appointment scheduled  Cara Lowry RN on 7/13/2023 at 6:20 AM          "

## 2023-07-14 ENCOUNTER — OFFICE VISIT (OUTPATIENT)
Dept: OBGYN | Facility: CLINIC | Age: 53
End: 2023-07-14
Payer: COMMERCIAL

## 2023-07-14 VITALS
SYSTOLIC BLOOD PRESSURE: 100 MMHG | DIASTOLIC BLOOD PRESSURE: 76 MMHG | HEIGHT: 63 IN | WEIGHT: 153.4 LBS | BODY MASS INDEX: 27.18 KG/M2

## 2023-07-14 DIAGNOSIS — Z13.6 ENCOUNTER FOR LIPID SCREENING FOR CARDIOVASCULAR DISEASE: ICD-10-CM

## 2023-07-14 DIAGNOSIS — R53.82 CHRONIC FATIGUE, UNSPECIFIED: ICD-10-CM

## 2023-07-14 DIAGNOSIS — B00.1 COLD SORE: ICD-10-CM

## 2023-07-14 DIAGNOSIS — Z13.228 SCREENING FOR METABOLIC DISORDER: ICD-10-CM

## 2023-07-14 DIAGNOSIS — Z13.29 SCREENING FOR THYROID DISORDER: ICD-10-CM

## 2023-07-14 DIAGNOSIS — Z01.419 ENCOUNTER FOR GYNECOLOGICAL EXAMINATION WITHOUT ABNORMAL FINDING: Primary | ICD-10-CM

## 2023-07-14 DIAGNOSIS — Z13.220 ENCOUNTER FOR LIPID SCREENING FOR CARDIOVASCULAR DISEASE: ICD-10-CM

## 2023-07-14 DIAGNOSIS — Z13.1 SCREENING FOR DIABETES MELLITUS: ICD-10-CM

## 2023-07-14 DIAGNOSIS — R61 NIGHT SWEATS: ICD-10-CM

## 2023-07-14 DIAGNOSIS — E03.9 ACQUIRED HYPOTHYROIDISM: ICD-10-CM

## 2023-07-14 DIAGNOSIS — Z13.21 ENCOUNTER FOR VITAMIN DEFICIENCY SCREENING: ICD-10-CM

## 2023-07-14 DIAGNOSIS — F32.A MILD DEPRESSION: ICD-10-CM

## 2023-07-14 DIAGNOSIS — G43.909 MIGRAINE WITHOUT STATUS MIGRAINOSUS, NOT INTRACTABLE, UNSPECIFIED MIGRAINE TYPE: ICD-10-CM

## 2023-07-14 LAB
ALBUMIN SERPL BCG-MCNC: 4.9 G/DL (ref 3.5–5.2)
ALP SERPL-CCNC: 74 U/L (ref 35–104)
ALT SERPL W P-5'-P-CCNC: 14 U/L (ref 0–50)
ANION GAP SERPL CALCULATED.3IONS-SCNC: 12 MMOL/L (ref 7–15)
AST SERPL W P-5'-P-CCNC: 33 U/L (ref 0–45)
BILIRUB SERPL-MCNC: 0.5 MG/DL
BUN SERPL-MCNC: 12.4 MG/DL (ref 6–20)
CALCIUM SERPL-MCNC: 9.7 MG/DL (ref 8.6–10)
CHLORIDE SERPL-SCNC: 104 MMOL/L (ref 98–107)
CHOLEST SERPL-MCNC: 192 MG/DL
CREAT SERPL-MCNC: 0.84 MG/DL (ref 0.51–0.95)
DEPRECATED HCO3 PLAS-SCNC: 25 MMOL/L (ref 22–29)
ERYTHROCYTE [DISTWIDTH] IN BLOOD BY AUTOMATED COUNT: 12.5 % (ref 10–15)
GFR SERPL CREATININE-BSD FRML MDRD: 82 ML/MIN/1.73M2
GLUCOSE SERPL-MCNC: 80 MG/DL (ref 70–99)
HBA1C MFR BLD: 5.3 % (ref 0–5.6)
HCT VFR BLD AUTO: 45.8 % (ref 35–47)
HDLC SERPL-MCNC: 47 MG/DL
HGB BLD-MCNC: 15.1 G/DL (ref 11.7–15.7)
LDLC SERPL CALC-MCNC: 130 MG/DL
MCH RBC QN AUTO: 31.8 PG (ref 26.5–33)
MCHC RBC AUTO-ENTMCNC: 33 G/DL (ref 31.5–36.5)
MCV RBC AUTO: 96 FL (ref 78–100)
NONHDLC SERPL-MCNC: 145 MG/DL
PLATELET # BLD AUTO: 242 10E3/UL (ref 150–450)
POTASSIUM SERPL-SCNC: 4.2 MMOL/L (ref 3.4–5.3)
PROT SERPL-MCNC: 7.5 G/DL (ref 6.4–8.3)
RBC # BLD AUTO: 4.75 10E6/UL (ref 3.8–5.2)
SODIUM SERPL-SCNC: 141 MMOL/L (ref 136–145)
TRIGL SERPL-MCNC: 76 MG/DL
TSH SERPL DL<=0.005 MIU/L-ACNC: 0.9 UIU/ML (ref 0.3–4.2)
WBC # BLD AUTO: 6.5 10E3/UL (ref 4–11)

## 2023-07-14 PROCEDURE — 80053 COMPREHEN METABOLIC PANEL: CPT | Performed by: OBSTETRICS & GYNECOLOGY

## 2023-07-14 PROCEDURE — 36415 COLL VENOUS BLD VENIPUNCTURE: CPT | Performed by: OBSTETRICS & GYNECOLOGY

## 2023-07-14 PROCEDURE — 90715 TDAP VACCINE 7 YRS/> IM: CPT | Performed by: OBSTETRICS & GYNECOLOGY

## 2023-07-14 PROCEDURE — 84443 ASSAY THYROID STIM HORMONE: CPT | Performed by: OBSTETRICS & GYNECOLOGY

## 2023-07-14 PROCEDURE — 99214 OFFICE O/P EST MOD 30 MIN: CPT | Mod: 25 | Performed by: OBSTETRICS & GYNECOLOGY

## 2023-07-14 PROCEDURE — 85027 COMPLETE CBC AUTOMATED: CPT | Performed by: OBSTETRICS & GYNECOLOGY

## 2023-07-14 PROCEDURE — 83036 HEMOGLOBIN GLYCOSYLATED A1C: CPT | Performed by: OBSTETRICS & GYNECOLOGY

## 2023-07-14 PROCEDURE — 99396 PREV VISIT EST AGE 40-64: CPT | Mod: 25 | Performed by: OBSTETRICS & GYNECOLOGY

## 2023-07-14 PROCEDURE — 80061 LIPID PANEL: CPT | Performed by: OBSTETRICS & GYNECOLOGY

## 2023-07-14 PROCEDURE — 90471 IMMUNIZATION ADMIN: CPT | Performed by: OBSTETRICS & GYNECOLOGY

## 2023-07-14 PROCEDURE — 82306 VITAMIN D 25 HYDROXY: CPT | Performed by: OBSTETRICS & GYNECOLOGY

## 2023-07-14 RX ORDER — VALACYCLOVIR HYDROCHLORIDE 1 G/1
TABLET, FILM COATED ORAL
Qty: 12 TABLET | Refills: 0 | Status: SHIPPED | OUTPATIENT
Start: 2023-07-14 | End: 2023-09-20

## 2023-07-14 RX ORDER — BUPROPION HYDROCHLORIDE 150 MG/1
150 TABLET ORAL EVERY MORNING
Qty: 90 TABLET | Refills: 4 | Status: SHIPPED | OUTPATIENT
Start: 2023-07-14 | End: 2023-07-18

## 2023-07-14 RX ORDER — SUMATRIPTAN 100 MG/1
100 TABLET, FILM COATED ORAL
Qty: 18 TABLET | Refills: 3 | Status: SHIPPED | OUTPATIENT
Start: 2023-07-14 | End: 2024-09-13

## 2023-07-14 RX ORDER — THYROID 60 MG/1
60 TABLET ORAL DAILY
Qty: 90 TABLET | Refills: 0 | Status: CANCELLED | OUTPATIENT
Start: 2023-07-14

## 2023-07-14 RX ORDER — ESTERIFIED ESTROGENS AND METHYLTESTOSTERONE .625; 1.25 MG/1; MG/1
1 TABLET ORAL DAILY
Qty: 90 TABLET | Refills: 3 | Status: SHIPPED | OUTPATIENT
Start: 2023-07-14 | End: 2024-02-28

## 2023-07-14 ASSESSMENT — ANXIETY QUESTIONNAIRES
7. FEELING AFRAID AS IF SOMETHING AWFUL MIGHT HAPPEN: NOT AT ALL
GAD7 TOTAL SCORE: 0
6. BECOMING EASILY ANNOYED OR IRRITABLE: NOT AT ALL
1. FEELING NERVOUS, ANXIOUS, OR ON EDGE: NOT AT ALL
5. BEING SO RESTLESS THAT IT IS HARD TO SIT STILL: NOT AT ALL
IF YOU CHECKED OFF ANY PROBLEMS ON THIS QUESTIONNAIRE, HOW DIFFICULT HAVE THESE PROBLEMS MADE IT FOR YOU TO DO YOUR WORK, TAKE CARE OF THINGS AT HOME, OR GET ALONG WITH OTHER PEOPLE: NOT DIFFICULT AT ALL
GAD7 TOTAL SCORE: 0
2. NOT BEING ABLE TO STOP OR CONTROL WORRYING: NOT AT ALL
3. WORRYING TOO MUCH ABOUT DIFFERENT THINGS: NOT AT ALL

## 2023-07-14 ASSESSMENT — PATIENT HEALTH QUESTIONNAIRE - PHQ9
5. POOR APPETITE OR OVEREATING: NOT AT ALL
SUM OF ALL RESPONSES TO PHQ QUESTIONS 1-9: 0

## 2023-07-15 LAB — DEPRECATED CALCIDIOL+CALCIFEROL SERPL-MC: 33 UG/L (ref 20–75)

## 2023-07-16 DIAGNOSIS — E03.9 ACQUIRED HYPOTHYROIDISM: ICD-10-CM

## 2023-07-16 RX ORDER — THYROID 60 MG/1
60 TABLET ORAL DAILY
Qty: 90 TABLET | Refills: 3 | Status: SHIPPED | OUTPATIENT
Start: 2023-07-16 | End: 2024-08-13

## 2023-07-16 NOTE — RESULT ENCOUNTER NOTE
Alvarado,    Your cholesterol is normal. The LDL, or bad cholesterol, should be under 130 and not 100 as shown (as long as you don't have any other heart disease risk factors like hypertension or diabetes), and your's is 130.    Other labs include your:    Metabolic panel which shows your blood sugar, electrolytes, kidney function, and liver enzymes, which are all normal.    Your hgb A1C, which is a test of how your overall blood sugars have been running over a 3 month period of time, is 5.3% which is normal    Your thyroid is normal so I sent in refills on the [employer] thyroid 60mg for the year.    Your vitamin D level is 33 with an ideal range of 40-50. It will typically decrease throughout the winter so if you're not already taking 2000 international unit(s)/d of over the counter Vitamin D3 you should plan to do that every day to maintain levels.    Your complete blood count is normal. This shows that you are not anemic with a normal hemoglobin and that your white blood cell count and platelet count are normal as well.      Teresa Longoria MD

## 2023-07-18 DIAGNOSIS — R53.82 CHRONIC FATIGUE, UNSPECIFIED: ICD-10-CM

## 2023-07-18 DIAGNOSIS — F32.A MILD DEPRESSION: ICD-10-CM

## 2023-07-18 RX ORDER — BUPROPION HYDROCHLORIDE 150 MG/1
150 TABLET ORAL EVERY MORNING
Qty: 90 TABLET | Refills: 4 | Status: SHIPPED | OUTPATIENT
Start: 2023-07-18 | End: 2024-09-13

## 2023-07-18 NOTE — TELEPHONE ENCOUNTER
"Requested Prescriptions   Pending Prescriptions Disp Refills     buPROPion (WELLBUTRIN XL) 150 MG 24 hr tablet 90 tablet 4     Sig: Take 1 tablet (150 mg) by mouth every morning       SSRIs Protocol Passed - 7/18/2023  4:32 PM        Passed - PHQ-9 score less than 5 in past 6 months     Please review last PHQ-9 score.           Passed - Medication is Bupropion     If the medication is Bupropion (Wellbutrin), and the patient is taking for smoking cessation; OK to refill.          Passed - Medication is active on med list        Passed - Patient is age 18 or older        Passed - No active pregnancy on record        Passed - No positive pregnancy test in last 12 months        Passed - Recent (6 mo) or future (30 days) visit within the authorizing provider's specialty     Patient had office visit in the last 6 months or has a visit in the next 30 days with authorizing provider or within the authorizing provider's specialty.  See \"Patient Info\" tab in inbasket, or \"Choose Columns\" in Meds & Orders section of the refill encounter.               Last Written Prescription Date:  7/14/23  Last Fill Quantity: 90,  # refills: 4   Last office visit: 7/14/2023 ; last virtual visit: Visit date not found with prescribing provider:  Teresa Longoria   Future Office Visit:            "

## 2023-08-20 PROBLEM — R53.82 CHRONIC FATIGUE, UNSPECIFIED: Status: ACTIVE | Noted: 2023-08-20

## 2023-08-20 PROBLEM — F32.A MILD DEPRESSION: Status: ACTIVE | Noted: 2023-08-20

## 2023-09-20 DIAGNOSIS — B00.1 COLD SORE: ICD-10-CM

## 2023-09-20 RX ORDER — VALACYCLOVIR HYDROCHLORIDE 1 G/1
TABLET, FILM COATED ORAL
Qty: 12 TABLET | Refills: 3 | Status: SHIPPED | OUTPATIENT
Start: 2023-09-20 | End: 2024-09-13

## 2023-09-20 NOTE — TELEPHONE ENCOUNTER
"Requested Prescriptions   Pending Prescriptions Disp Refills    valACYclovir (VALTREX) 1000 mg tablet [Pharmacy Med Name: valACYclovir HCl 1 GM Oral Tablet] 12 tablet 3     Sig: TAKE 2 TABLETS BY MOUTH TWICE  DAILY FOR 1 DAY AT ONSET OF COLD SORE SYMPTOMS       Antivirals for Herpes Protocol Passed - 9/20/2023  5:01 AM        Passed - Patient is age 12 or older        Passed - Recent (12 mo) or future (30 days) visit within the authorizing provider's specialty     Patient has had an office visit with the authorizing provider or a provider within the authorizing providers department within the previous 12 mos or has a future within next 30 days. See \"Patient Info\" tab in inbasket, or \"Choose Columns\" in Meds & Orders section of the refill encounter.              Passed - Medication is active on med list        Passed - Normal serum creatinine on file in past 12 months     Recent Labs   Lab Test 07/14/23  1422   CR 0.84       Ok to refill medication if creatinine is low             Last Written Prescription Date:  7/14/23  Last Fill Quantity: 12,  # refills: 0   Last office visit: 7/14/2023 ; last virtual visit: Visit date not found with prescribing provider:  Von   Future Office Visit:  NONE    Prescription approved per [facility] Refill Protocol.  Cara Lowry RN on 9/20/2023 at 6:18 AM          "

## 2023-11-02 ENCOUNTER — TRANSFERRED RECORDS (OUTPATIENT)
Dept: HEALTH INFORMATION MANAGEMENT | Facility: CLINIC | Age: 53
End: 2023-11-02
Payer: COMMERCIAL

## 2023-11-02 LAB
ALT SERPL-CCNC: 23 IU/L (ref 5–35)
AST SERPL-CCNC: 25 U/L (ref 5–34)
CREATININE (EXTERNAL): 0.94 MG/DL (ref 0.5–1.3)

## 2023-11-13 NOTE — PROGRESS NOTES
2/27/19 NIL pap, + HR HPV 16. Plan: colpo  3/5/19 left msg/Advised of result and follow up.  3/27/19 Colpo: Bx-neg.  Plan: cotest in 1 year   How Severe Are Your Spot(S)?: mild What Type Of Note Output Would You Prefer (Optional)?: Bullet Format What Is The Reason For Today's Visit?: Full Body Skin Examination What Is The Reason For Today's Visit? (Being Monitored For X): concerning skin lesions on an annual basis

## 2024-01-01 NOTE — NURSING NOTE
"Chief Complaint   Patient presents with     URI       Initial /73 (BP Location: Right arm, Cuff Size: Adult Regular)  Pulse 77  Temp 98.4  F (36.9  C) (Oral)  Ht 5' 3\" (1.6 m)  Wt 154 lb (69.9 kg)  SpO2 98%  BMI 27.28 kg/m2 Estimated body mass index is 27.28 kg/(m^2) as calculated from the following:    Height as of this encounter: 5' 3\" (1.6 m).    Weight as of this encounter: 154 lb (69.9 kg).  Medication Reconciliation: complete       Cary Michele CMA      "
N/A

## 2024-02-21 DIAGNOSIS — R61 NIGHT SWEATS: ICD-10-CM

## 2024-02-21 RX ORDER — ESTERIFIED ESTROGENS AND METHYLTESTOSTERONE .625; 1.25 MG/1; MG/1
1 TABLET ORAL DAILY
Qty: 90 TABLET | OUTPATIENT
Start: 2024-02-21

## 2024-02-21 NOTE — TELEPHONE ENCOUNTER
Requested Prescriptions   Pending Prescriptions Disp Refills    EST ESTROGENS-METHYLTEST HS 0.625-1.25 MG per tablet [Pharmacy Med Name: EST  1.25MG TABLET  ESTRO MTEST .625] 90 tablet      Sig: TAKE 1 TABLET BY MOUTH DAILY       There is no refill protocol information for this order        Last Written Prescription Date:  7/14/23  Last Fill Quantity: 90,  # refills: 3   Last office visit: 7/14/2023 ; last virtual visit: Visit date not found with prescribing provider:  Von Ho Office Visit:        Refill request refused due to:  [x]Refills available at pharmacy. Request sent back to pharmacy as duplicate.  []Patient reports no longer needing medication.  []Medication discontinued.   Jenniffer Guzman RN on 2/21/2024 at 3:50 PM

## 2024-02-27 DIAGNOSIS — R61 NIGHT SWEATS: ICD-10-CM

## 2024-02-27 NOTE — TELEPHONE ENCOUNTER
Requested Prescriptions   Pending Prescriptions Disp Refills    EST ESTROGENS-METHYLTEST HS 0.625-1.25 MG per tablet [Pharmacy Med Name: EST  1.25MG TABLET  ESTRO MTEST .625] 90 tablet      Sig: TAKE 1 TABLET BY MOUTH DAILY       There is no refill protocol information for this order        Last Written Prescription Date:  7/14/2023  Last Fill Quantity: 90,  # refills: 3   Last office visit: 7/14/2023 ; last virtual visit: Visit date not found with prescribing provider:  Dr Longoria   Future Office Visit:      Up to date on annual.  This medication is up for the 6 month controlled substance renewal. Routing to provider to sign.    Yajaira Noland RN on 2/27/2024 at 12:49 PM

## 2024-02-28 RX ORDER — ESTERIFIED ESTROGENS AND METHYLTESTOSTERONE .625; 1.25 MG/1; MG/1
1 TABLET ORAL DAILY
Qty: 90 TABLET | Refills: 1 | Status: SHIPPED | OUTPATIENT
Start: 2024-02-28 | End: 2024-09-13

## 2024-05-03 ENCOUNTER — TRANSFERRED RECORDS (OUTPATIENT)
Dept: HEALTH INFORMATION MANAGEMENT | Facility: CLINIC | Age: 54
End: 2024-05-03
Payer: COMMERCIAL

## 2024-05-03 LAB
ALT SERPL-CCNC: 32 IU/L (ref 5–35)
AST SERPL-CCNC: 34 U/L (ref 5–34)
CREATININE (EXTERNAL): 0.96 MG/DL (ref 0.5–1.3)

## 2024-07-06 ENCOUNTER — HEALTH MAINTENANCE LETTER (OUTPATIENT)
Age: 54
End: 2024-07-06

## 2024-08-12 DIAGNOSIS — E03.9 ACQUIRED HYPOTHYROIDISM: ICD-10-CM

## 2024-08-13 RX ORDER — THYROID 60 MG
60 TABLET ORAL DAILY
Qty: 90 TABLET | Refills: 0 | Status: SHIPPED | OUTPATIENT
Start: 2024-08-13 | End: 2024-09-13

## 2024-08-13 NOTE — TELEPHONE ENCOUNTER
Requested Prescriptions   Pending Prescriptions Disp Refills    CHAD THYROID 60 MG tablet [Pharmacy Med Name: [place] Thyroid 60 MG Oral Tablet] 90 tablet 3     Sig: TAKE 1 TABLET BY MOUTH DAILY       Thyroid Protocol Failed - 8/12/2024  9:10 PM        Failed - Recent (12 mo) or future (30 days) visit within the authorizing provider's specialty     The patient must have completed an in-person or virtual visit within the past 12 months or has a future visit scheduled within the next 90 days with the authorizing provider s specialty.  Urgent care and e-visits do not quality as an office visit for this protocol.          Failed - Normal TSH on file in past 12 months     Recent Labs   Lab Test 07/14/23  1422   TSH 0.90              Passed - Patient is 12 years or older        Passed - Medication is active on med list        Passed - Medication indicated for associated diagnosis     Medication is associated with one or more of the following diagnoses:  Hypothyroidism  Thyroid stimulating hormone suppression therapy  Thyroid cancer  Acquired atrophy of thyroid          Passed - No active pregnancy on record     If patient is pregnant or has had a positive pregnancy test, please check TSH.          Passed - No positive pregnancy test in past 12 months     If patient is pregnant or has had a positive pregnancy test, please check TSH.             Appt scheduled 9/13  Prescription approved per [facility] Refill Protocol.  Radha De Leon RN on 8/13/2024 at 9:03 AM

## 2024-09-01 DIAGNOSIS — F32.A MILD DEPRESSION: ICD-10-CM

## 2024-09-01 DIAGNOSIS — R53.82 CHRONIC FATIGUE, UNSPECIFIED: ICD-10-CM

## 2024-09-03 RX ORDER — BUPROPION HYDROCHLORIDE 150 MG/1
150 TABLET ORAL EVERY MORNING
Qty: 90 TABLET | Refills: 3 | OUTPATIENT
Start: 2024-09-03

## 2024-09-03 NOTE — TELEPHONE ENCOUNTER
Requested Prescriptions   Pending Prescriptions Disp Refills    buPROPion (WELLBUTRIN XL) 150 MG 24 hr tablet [Pharmacy Med Name: buPROPion HCl ER (XL) 150 MG Oral Tablet Extended Release 24 Hour] 90 tablet 3     Sig: TAKE 1 TABLET BY MOUTH IN THE  MORNING       Rx Protocol Bupropion Failed - 9/1/2024  9:36 PM        Failed - PHQ-9 score of less than 5 in past 6 months     Please review last PHQ-9 score.           Failed - Recent (12 mo) or future (90 days) visit within the authorizing provider's specialty     The patient must have completed an in-person or virtual visit within the past 12 months or has a future visit scheduled within the next 90 days with the authorizing provider s specialty.  Urgent care and e-visits do not quality as an office visit for this protocol.          Failed - Blood pressure on file in the past 12 months        Passed - The medication is active on the med list        Passed - Medication is indicated for associated diagnosis     Medication is associated with one or more of the following diagnoses:  Anxiety  Bipolar Disorder  Depression  Smoking Cessation  Adjustment disorder with mixed anxiety and depressed mood  Adjustment disorder with anxiety  Tobacco use disorder  Tobacco abuse          Passed - Patient is age 18 or older        Passed - No active pregnancy on record        Passed - No positive pregnancy test in past 12 months           Last Written Prescription Date:  7/18/24  Last Fill Quantity: 90,  # refills: 4   Last office visit: 7/14/2023 ; last virtual visit: Visit date not found with prescribing provider:  Von   Future Office Visit:      Refill request refused due to:  [x]Refills available at pharmacy. Request sent back to pharmacy as duplicate.  []Patient reports no longer needing medication.  []Medication discontinued.   Jenniffer Guzman RN on 9/3/2024 at 11:01 AM   MALICK ZAMORA

## 2024-09-12 PROBLEM — N84.0 ENDOMETRIAL POLYP: Status: RESOLVED | Noted: 2020-11-05 | Resolved: 2024-09-12

## 2024-09-12 PROBLEM — N93.8 DUB (DYSFUNCTIONAL UTERINE BLEEDING): Status: RESOLVED | Noted: 2020-11-05 | Resolved: 2024-09-12

## 2024-09-12 PROBLEM — R87.810 CERVICAL HIGH RISK HPV (HUMAN PAPILLOMAVIRUS) TEST POSITIVE: Status: RESOLVED | Noted: 2019-02-27 | Resolved: 2024-09-12

## 2024-09-12 PROBLEM — R87.810 CERVICAL HIGH RISK HUMAN PAPILLOMAVIRUS (HPV) DNA TEST POSITIVE: Status: RESOLVED | Noted: 2020-11-05 | Resolved: 2024-09-12

## 2024-09-12 PROBLEM — D25.1 INTRAMURAL LEIOMYOMA OF UTERUS: Status: RESOLVED | Noted: 2020-11-05 | Resolved: 2024-09-12

## 2024-09-12 PROBLEM — F33.40 RECURRENT MAJOR DEPRESSION IN REMISSION (H): Status: RESOLVED | Noted: 2022-03-26 | Resolved: 2024-09-12

## 2024-09-12 RX ORDER — CELECOXIB 200 MG/1
CAPSULE ORAL
COMMUNITY
Start: 2024-07-08

## 2024-09-12 RX ORDER — TRIAMCINOLONE ACETONIDE 1 MG/G
CREAM TOPICAL
COMMUNITY
Start: 2024-07-19

## 2024-09-12 NOTE — PROGRESS NOTES
Shelby is a 54 year old  female who presents for annual exam.     Besides routine health maintenance, she has no other health concerns today .    HPI:  The patient's PCP is KATHY Stoll [facility]     Patient presents for an annual visit.    No longer experiencing vasomotor sx or any issues that she dealt with for years when trying to do vivelle at different doses or oral estradiol.  On the estratest for 2 yrs now and great.  Hyst was in .   Reported she feels like she has more chin hair since starting Estratest or at least in this last year. Not sure if had it the first year of being on it, or just didn't notice. Isn't sure if it's just more time in menopause or the testosterone. It's a minor inconvenience and not at all something she'd want to stop the med for or even decrease it.    Reported some vaginal discharge, since being on estratest. No itching or irritation, no odor. It's on and off and not consistent but definitely has a lot of it so never sure what it is.      Depression well controlled on 150mg Wellbutrin/day. Had stopped her sertraline and wellbutrin a couple years back b/c just didn't feel she wanted to be on them and possibly side effects, etc  After a year or so off, she felt more depressed and low mood and low energy and fatigue, much more than anxiety as that was never a huge part of it for her.  So last year just restarted the wellbutrin w/o the sertraline and doing great.  No anxiety and depression well controlled     Still seeing rheum for her sjogren's/ank spond and doing methotrex/evoxac     Migraines are fairly infrequent now but imitrex refill needed as uses it when has one and always works well for her    She now lives in [place] after marrying her finace from last year.  All went really fast from dating to engagement to marriage but really happy there.  she has an [place] license and did just establish PCP in [place] but really would like to still come up to  [place] to continue her care with me.    One daughter is in the [facility]. Her other is in [place] and  to her wife and doing well   The X BF that was sending letters to family and showing up places she was finally stopped after she moved away and hasn't had any contact from him which is great.    GYNECOLOGIC HISTORY:    Patient's last menstrual period was 2019 (lmp unknown).    Her current contraception method is: hysterectomy.  She  reports that she has never smoked. She has never used smokeless tobacco.    Patient is sexually active.  STD testing offered?  Declined  Last PHQ-9 score on record =       2024     1:31 PM   PHQ-9 SCORE   PHQ-9 Total Score 1     Last GAD7 score on record =       2024     1:31 PM   MOOSE-7 SCORE   Total Score 0     Alcohol Score = 4    HEALTH MAINTENANCE:  Cholesterol:   Recent Labs   Lab Test 23  1422 22  1209   CHOL 192 237*   HDL 47* 78   * 143*   TRIG 76 81       Last Mammo:  3/23 diagnostic Lt breast us , Result: Normal, Next Mammo: Today     Pap:   Lab Results   Component Value Date    PAP NIL 2020    PAP NIL 2019     Colonoscopy:  19, Result: Normal, Next Colonoscopy: 10 years.  Dexa:      Health maintenance updated:  yes    HISTORY:  OB History    Para Term  AB Living   2 2 2 0 0 2   SAB IAB Ectopic Multiple Live Births   0 0 0 0 2      # Outcome Date GA Lbr Ramu/2nd Weight Sex Type Anes PTL Lv   2 Term 89    F    [facility]   1 Term 87    F    [facility]       Patient Active Problem List   Diagnosis    Acquired hypothyroidism    CARDIOVASCULAR SCREENING; LDL GOAL LESS THAN 160    Cluster headache    Anxiety    Low back pain    Hyperhidrosis of soles    AS (ankylosing spondylitis) (H)    Herpes labialis    Sjogren's syndrome (H24)    TMJ (temporomandibular joint syndrome)    Herpes zoster    OA (osteoarthritis)    Post-menopause on HRT (hormone replacement therapy)    Symptoms, such as flushing,  sleeplessness, headache, lack of concentration, associated with the menopause    Migraine without status migrainosus, not intractable, unspecified migraine type    Night sweats    Mild depression    Chronic fatigue, unspecified     Past Surgical History:   Procedure Laterality Date    ABDOMEN SURGERY  12/2020    COLONOSCOPY N/A 12/9/2019    Procedure: COLONOSCOPY;  Surgeon: Gem Elder MD;  Location:  GI    CYSTOSCOPY N/A 12/8/2020    Procedure: CYSTOSCOPY;  Surgeon: Teresa Longoria MD;  Location:  OR     HYSTEROS W PERMANENT FALLOPAIN IMPLANT  8/30/2010    By Dr. Longoria    HC HYSTEROSALPINGOGRAM  12/28/10    post essure bilateral tubal occlusion    LAPAROSCOPIC HYSTERECTOMY TOTAL, BILATERAL SALPINGO-OOPHORECTOMY, COMBINED N/A 12/8/2020    Procedure: TOTAL LAPAROSCOPIC HYSTERECTOMY, BILATERAL SALPINGO OOPHORECTOMY,;  Surgeon: Teresa Longoria MD;  Location:  OR    MAMMOPLASTY AUGMENTATION  2008    ROTATOR CUFF REPAIR RT/LT  4/12      Social History     Tobacco Use    Smoking status: Never    Smokeless tobacco: Never   Substance Use Topics    Alcohol use: Yes     Alcohol/week: 0.0 standard drinks of alcohol     Comment: OCCASSIONAL      Problem (# of Occurrences) Relation (Name,Age of Onset)    Substance Abuse (2) Brother (Marko), Brother (Hal): alcohol    Cancer (2) Brother (Marko): panc ca, Sister: skin cancer    Diabetes (1) Maternal Grandfather    Hypertension (2) Father (Guilherme), Mother (Jacqueline)    Thyroid Disease (2) Maternal Grandfather, Maternal Grandmother (Patito)    Other Cancer (2) Father (Guilherme): Skin, Brother (Hal): Pancreatic    Coronary Artery Disease (1) Father (Guilherme): mi, PVD     Rheumatoid Arthritis (2) Father (Guilherme), Sister: passed away              Current Outpatient Medications   Medication Sig Dispense Refill    buPROPion (WELLBUTRIN XL) 150 MG 24 hr tablet Take 1 tablet (150 mg) by mouth every morning. 90 tablet 4    celecoxib (CELEBREX) 200 MG capsule       cevimeline (EVOXAC)  "30 MG capsule Take 30 mg by mouth 3 times daily       Cholecalciferol (VITAMIN D PO) Take 1 tablet by mouth daily       cyclobenzaprine (FLEXERIL) 10 MG tablet Take 0.5-1 tablets (5-10 mg) by mouth 2 times daily as needed for muscle spasms 30 tablet 0    estrogens-methylTESTOSTERone (EST ESTROGENS-METHYLTEST HS) 0.625-1.25 MG per tablet Take 1 tablet by mouth daily. 90 tablet 1    folic acid (FOLVITE) 1 MG tablet Take 1 mg by mouth daily      methotrexate 2.5 MG tablet Take 15 mg by mouth every 7 days On Sundays (takes 6 x 2.5mg)      omeprazole 20 MG tablet Take 20 mg by mouth daily      Saccharomyces boulardii (PROBIOTIC) 250 MG CAPS       SUMAtriptan (IMITREX) 100 MG tablet Take 1 tablet (100 mg) by mouth at onset of headache for migraine. 18 tablet 3    triamcinolone (KENALOG) 0.1 % external cream       valACYclovir (VALTREX) 1000 mg tablet TAKE 2 TABLETS BY MOUTH TWICE  DAILY FOR 1 DAY AT ONSET OF COLD SORE SYMPTOMS 12 tablet 3     No current facility-administered medications for this visit.     Allergies   Allergen Reactions    Penicillins Rash       Past medical, surgical, social and family histories were reviewed and updated in EPIC.    EXAM:  /78   Ht 1.6 m (5' 3\")   Wt 72.5 kg (159 lb 12.8 oz)   LMP 01/01/2019 (LMP Unknown)   Breastfeeding No   BMI 28.31 kg/m     BMI: Body mass index is 28.31 kg/m .    PHYSICAL EXAM:  Constitutional:   Appearance: Well nourished, well developed, alert, in no acute distress  Neck:  Lymph Nodes:  No lymphadenopathy present    Thyroid:  Gland size normal, nontender, no nodules or masses present  on palpation  Chest:  Respiratory Effort:  Breathing unlabored, CTA B  Cardiovascular:    Heart: Auscultation:  Regular rate, normal rhythm, no murmurs present  Breasts: Inspection of Breasts:  No lymphadenopathy present., Palpation of Breasts and Axillae:  No masses present on palpation, no breast tenderness., Axillary Lymph Nodes:  No lymphadenopathy present., and No " nodularity, asymmetry or nipple discharge bilaterally. IMPLANTS BILATERALLY  Gastrointestinal:   Abdominal Examination:  Abdomen nontender to palpation, tone normal without rigidity or guarding, no masses present, umbilicus without lesions   Liver and Spleen:  No hepatomegaly present, liver nontender to palpation    Hernias:  No hernias present  Lymphatic: Lymph Nodes:  No other lymphadenopathy present  Skin:  General Inspection:  No rashes present, no lesions present, no areas of  discoloration  Neurologic:    Mental Status:  Oriented X3.  Normal strength and tone, sensory exam                grossly normal, mentation intact and speech normal.    Psychiatric:   Mentation appears normal and affect normal/bright.         Pelvic Exam:  External Genitalia:     Normal appearance for age, no discharge present, no tenderness present, no inflammatory lesions present, color normal  Vagina:     Normal vaginal vault with a HEAVY BUT NORMAL APPEARING DISCHARGE. YELLOWISH/WHITE AND LIQUIDY/MUCOUS, NO ODOR, NOT THICK OR CLUMPY LIKE YEAST, NO ERYTHEMA  Bladder:     Nontender to palpation  Urethra:   Urethral Body:  Urethra palpation normal, urethra structural support normal   Urethral Meatus:  No erythema or lesions present  Cervix:     Surgically absent  Uterus:     Surgically absent  Adnexa:     No adnexal masses or tenderness  Perineum:     Perineum within normal limits, no evidence of trauma, no rashes or skin lesions present  Anus:     Anus within normal limits, no hemorrhoids present  Inguinal Lymph Nodes:     No lymphadenopathy present    COUNSELING:   Reviewed preventive health counseling, as reflected in patient instructions  Special attention given to:        DEPRESSION, AND OTHER MEDICAL ISSUES MANAGED FOR HER       (Rani)menopause management    BMI: Body mass index is 28.31 kg/m .  Weight management plan: Discussed healthy diet and exercise guidelines    ASSESSMENT:  54 year old female with satisfactory annual exam.     ICD-10-CM    1. Encounter for gynecological examination without abnormal finding  Z01.419       2. Mild depression  F32.A buPROPion (WELLBUTRIN XL) 150 MG 24 hr tablet      3. Chronic fatigue, unspecified  R53.82 buPROPion (WELLBUTRIN XL) 150 MG 24 hr tablet      4. Night sweats  R61 estrogens-methylTESTOSTERone (EST ESTROGENS-METHYLTEST HS) 0.625-1.25 MG per tablet      5. Migraine without status migrainosus, not intractable, unspecified migraine type  G43.909 SUMAtriptan (IMITREX) 100 MG tablet      6. Acquired hypothyroidism  E03.9 TSH with free T4 reflex     TSH with free T4 reflex     DISCONTINUED: thyroid (ARMOUR THYROID) 60 MG tablet      7. Cold sore  B00.1 valACYclovir (VALTREX) 1000 mg tablet      8. Need for prophylactic vaccination and inoculation against influenza  Z23       9. Immunity status testing  Z01.84 Hepatitis B Surface Antibody     Hepatitis B Surface Antibody      10. Screening for metabolic disorder  Z13.228 Comprehensive metabolic panel     Comprehensive metabolic panel      11. Encounter for lipid screening for cardiovascular disease  Z13.220 Lipid panel reflex to direct LDL Fasting    Z13.6 Hemoglobin A1c     Lipid panel reflex to direct LDL Fasting     Hemoglobin A1c      12. Screening for diabetes mellitus  Z13.1       13. Encounter for vitamin deficiency screening  Z13.21 Vitamin D Deficiency     Vitamin D Deficiency      14. Screening for disorder of blood and blood-forming organs  Z13.0 CBC with platelets     CBC with platelets      15. Vaginal discharge  N89.8 Multiplex Vaginal Panel by PCR     Multiplex Vaginal Panel by PCR      16. Hirsutism  L68.0 Testosterone Free and Total     Testosterone Free and Total          PLAN:  Pap no longer indicated  S/p hysterectomy.     Mammo today    Fasting labs today as hasn't yet seen the PCP in [place]  Is going to transfer the labs and other mgmt to that person as they're much closer to her now and knows someone near home when has needs is  important, especially with progressing age.  So will do fasting labs today and based on TSH will need armour thyroid refilled     The patient is showing that she is due for Hepatitis B vaccination. Will do antibody testing to see if showing immunity or not and then based on that will recommend if Hepatitis B vaccination is actually needed or not.    Will address any abnormalities once results are back.    Additional health issues addressed at today's visit include:    Discussed menopause and typical course of v.m sx and expectations from ERT alone especially with patch and different generic manufacturers  Discussed the pros/cons of estratest and overall is really having great sx control and no vaginal dryness or pain with sex and libido is appropriate, etc  Unclear if focal chin hirsutism is the testosterone or just menopausal progression  However is on the full strength so will check total and free T today and if very high will need to cut dose down but if relatively normal range after review of the pros/cons, she would prefer to stay on it as is on current dose and then can send in refills    Imitrex and valtrex sent in for prn use with migraines and cold sores    Has heavier discharge, especially given no cervix ad menopause and that may just be supraphysiologic b/c of her estratest  Will send MVP and if has any infection will treat it but this may just be due to hormone therapy she's on    No anxiety and her depression that was rebounding is well controlled on just the wellbutrin w/o feeling need to restart sertraline or consider selective serotonin reuptake inhibitor therapy at all  Wellbutrin refilled for the year      30  minutes in addition to the routine annual preventative exam,  was spent on direct management of the patient's other medical issue(s) as above, including chart review and chart completion, on the same DOS      Teresa Longoria MD

## 2024-09-13 ENCOUNTER — OFFICE VISIT (OUTPATIENT)
Dept: OBGYN | Facility: CLINIC | Age: 54
End: 2024-09-13
Payer: COMMERCIAL

## 2024-09-13 ENCOUNTER — ANCILLARY PROCEDURE (OUTPATIENT)
Dept: MAMMOGRAPHY | Facility: CLINIC | Age: 54
End: 2024-09-13
Payer: COMMERCIAL

## 2024-09-13 VITALS
BODY MASS INDEX: 28.31 KG/M2 | HEIGHT: 63 IN | DIASTOLIC BLOOD PRESSURE: 78 MMHG | SYSTOLIC BLOOD PRESSURE: 100 MMHG | WEIGHT: 159.8 LBS

## 2024-09-13 DIAGNOSIS — R61 NIGHT SWEATS: ICD-10-CM

## 2024-09-13 DIAGNOSIS — Z23 NEED FOR PROPHYLACTIC VACCINATION AND INOCULATION AGAINST INFLUENZA: ICD-10-CM

## 2024-09-13 DIAGNOSIS — N89.8 VAGINAL DISCHARGE: ICD-10-CM

## 2024-09-13 DIAGNOSIS — F32.A MILD DEPRESSION: ICD-10-CM

## 2024-09-13 DIAGNOSIS — G43.909 MIGRAINE WITHOUT STATUS MIGRAINOSUS, NOT INTRACTABLE, UNSPECIFIED MIGRAINE TYPE: ICD-10-CM

## 2024-09-13 DIAGNOSIS — Z13.6 ENCOUNTER FOR LIPID SCREENING FOR CARDIOVASCULAR DISEASE: ICD-10-CM

## 2024-09-13 DIAGNOSIS — Z13.21 ENCOUNTER FOR VITAMIN DEFICIENCY SCREENING: ICD-10-CM

## 2024-09-13 DIAGNOSIS — R53.82 CHRONIC FATIGUE, UNSPECIFIED: ICD-10-CM

## 2024-09-13 DIAGNOSIS — Z01.419 ENCOUNTER FOR GYNECOLOGICAL EXAMINATION WITHOUT ABNORMAL FINDING: Primary | ICD-10-CM

## 2024-09-13 DIAGNOSIS — Z13.1 SCREENING FOR DIABETES MELLITUS: ICD-10-CM

## 2024-09-13 DIAGNOSIS — B37.31 YEAST VAGINITIS: ICD-10-CM

## 2024-09-13 DIAGNOSIS — Z13.228 SCREENING FOR METABOLIC DISORDER: ICD-10-CM

## 2024-09-13 DIAGNOSIS — Z12.31 VISIT FOR SCREENING MAMMOGRAM: ICD-10-CM

## 2024-09-13 DIAGNOSIS — Z13.0 SCREENING FOR DISORDER OF BLOOD AND BLOOD-FORMING ORGANS: ICD-10-CM

## 2024-09-13 DIAGNOSIS — Z01.84 IMMUNITY STATUS TESTING: ICD-10-CM

## 2024-09-13 DIAGNOSIS — L68.0 HIRSUTISM: ICD-10-CM

## 2024-09-13 DIAGNOSIS — E03.9 ACQUIRED HYPOTHYROIDISM: ICD-10-CM

## 2024-09-13 DIAGNOSIS — B00.1 COLD SORE: ICD-10-CM

## 2024-09-13 DIAGNOSIS — Z13.220 ENCOUNTER FOR LIPID SCREENING FOR CARDIOVASCULAR DISEASE: ICD-10-CM

## 2024-09-13 LAB
ALBUMIN SERPL BCG-MCNC: 4.4 G/DL (ref 3.5–5.2)
ALP SERPL-CCNC: 82 U/L (ref 40–150)
ALT SERPL W P-5'-P-CCNC: 30 U/L (ref 0–50)
ANION GAP SERPL CALCULATED.3IONS-SCNC: 10 MMOL/L (ref 7–15)
AST SERPL W P-5'-P-CCNC: 37 U/L (ref 0–45)
BACTERIAL VAGINOSIS VAG-IMP: NEGATIVE
BILIRUB SERPL-MCNC: 0.6 MG/DL
BUN SERPL-MCNC: 11 MG/DL (ref 6–20)
CALCIUM SERPL-MCNC: 9.3 MG/DL (ref 8.8–10.4)
CANDIDA DNA VAG QL NAA+PROBE: DETECTED
CANDIDA GLABRATA / CANDIDA KRUSEI DNA: NOT DETECTED
CHLORIDE SERPL-SCNC: 105 MMOL/L (ref 98–107)
CHOLEST SERPL-MCNC: 163 MG/DL
CREAT SERPL-MCNC: 0.9 MG/DL (ref 0.51–0.95)
EGFRCR SERPLBLD CKD-EPI 2021: 75 ML/MIN/1.73M2
ERYTHROCYTE [DISTWIDTH] IN BLOOD BY AUTOMATED COUNT: 13.2 % (ref 10–15)
FASTING STATUS PATIENT QL REPORTED: YES
FASTING STATUS PATIENT QL REPORTED: YES
GLUCOSE SERPL-MCNC: 81 MG/DL (ref 70–99)
HBA1C MFR BLD: 5.1 % (ref 0–5.6)
HBV SURFACE AB SERPL IA-ACNC: <3.5 M[IU]/ML
HBV SURFACE AB SERPL IA-ACNC: NONREACTIVE M[IU]/ML
HCO3 SERPL-SCNC: 23 MMOL/L (ref 22–29)
HCT VFR BLD AUTO: 42.9 % (ref 35–47)
HDLC SERPL-MCNC: 44 MG/DL
HGB BLD-MCNC: 14.1 G/DL (ref 11.7–15.7)
LDLC SERPL CALC-MCNC: 108 MG/DL
MCH RBC QN AUTO: 31.7 PG (ref 26.5–33)
MCHC RBC AUTO-ENTMCNC: 32.9 G/DL (ref 31.5–36.5)
MCV RBC AUTO: 96 FL (ref 78–100)
NONHDLC SERPL-MCNC: 119 MG/DL
PLATELET # BLD AUTO: 244 10E3/UL (ref 150–450)
POTASSIUM SERPL-SCNC: 4.7 MMOL/L (ref 3.4–5.3)
PROT SERPL-MCNC: 7.1 G/DL (ref 6.4–8.3)
RBC # BLD AUTO: 4.45 10E6/UL (ref 3.8–5.2)
SHBG SERPL-SCNC: 40 NMOL/L (ref 30–135)
SODIUM SERPL-SCNC: 138 MMOL/L (ref 135–145)
T VAGINALIS DNA VAG QL NAA+PROBE: NOT DETECTED
TRIGL SERPL-MCNC: 54 MG/DL
TSH SERPL DL<=0.005 MIU/L-ACNC: 0.97 UIU/ML (ref 0.3–4.2)
VIT D+METAB SERPL-MCNC: 41 NG/ML (ref 20–50)
WBC # BLD AUTO: 8.9 10E3/UL (ref 4–11)

## 2024-09-13 PROCEDURE — 83036 HEMOGLOBIN GLYCOSYLATED A1C: CPT | Performed by: OBSTETRICS & GYNECOLOGY

## 2024-09-13 PROCEDURE — 82306 VITAMIN D 25 HYDROXY: CPT | Performed by: OBSTETRICS & GYNECOLOGY

## 2024-09-13 PROCEDURE — 85027 COMPLETE CBC AUTOMATED: CPT | Performed by: OBSTETRICS & GYNECOLOGY

## 2024-09-13 PROCEDURE — 86706 HEP B SURFACE ANTIBODY: CPT | Performed by: OBSTETRICS & GYNECOLOGY

## 2024-09-13 PROCEDURE — 84270 ASSAY OF SEX HORMONE GLOBUL: CPT | Performed by: OBSTETRICS & GYNECOLOGY

## 2024-09-13 PROCEDURE — 80053 COMPREHEN METABOLIC PANEL: CPT | Performed by: OBSTETRICS & GYNECOLOGY

## 2024-09-13 PROCEDURE — 0352U MULTIPLEX VAGINAL PANEL BY PCR: CPT | Performed by: OBSTETRICS & GYNECOLOGY

## 2024-09-13 PROCEDURE — 80061 LIPID PANEL: CPT | Performed by: OBSTETRICS & GYNECOLOGY

## 2024-09-13 PROCEDURE — 36415 COLL VENOUS BLD VENIPUNCTURE: CPT | Performed by: OBSTETRICS & GYNECOLOGY

## 2024-09-13 PROCEDURE — 77063 BREAST TOMOSYNTHESIS BI: CPT | Mod: TC | Performed by: RADIOLOGY

## 2024-09-13 PROCEDURE — 90673 RIV3 VACCINE NO PRESERV IM: CPT | Performed by: OBSTETRICS & GYNECOLOGY

## 2024-09-13 PROCEDURE — 77067 SCR MAMMO BI INCL CAD: CPT | Mod: TC | Performed by: RADIOLOGY

## 2024-09-13 PROCEDURE — 84403 ASSAY OF TOTAL TESTOSTERONE: CPT | Performed by: OBSTETRICS & GYNECOLOGY

## 2024-09-13 PROCEDURE — 99396 PREV VISIT EST AGE 40-64: CPT | Mod: 25 | Performed by: OBSTETRICS & GYNECOLOGY

## 2024-09-13 PROCEDURE — 99459 PELVIC EXAMINATION: CPT | Performed by: OBSTETRICS & GYNECOLOGY

## 2024-09-13 PROCEDURE — 99214 OFFICE O/P EST MOD 30 MIN: CPT | Mod: 25 | Performed by: OBSTETRICS & GYNECOLOGY

## 2024-09-13 PROCEDURE — 90471 IMMUNIZATION ADMIN: CPT | Performed by: OBSTETRICS & GYNECOLOGY

## 2024-09-13 PROCEDURE — 84443 ASSAY THYROID STIM HORMONE: CPT | Performed by: OBSTETRICS & GYNECOLOGY

## 2024-09-13 RX ORDER — VALACYCLOVIR HYDROCHLORIDE 1 G/1
TABLET, FILM COATED ORAL
Qty: 12 TABLET | Refills: 3 | Status: SHIPPED | OUTPATIENT
Start: 2024-09-13

## 2024-09-13 RX ORDER — BUPROPION HYDROCHLORIDE 150 MG/1
150 TABLET ORAL EVERY MORNING
Qty: 90 TABLET | Refills: 4 | Status: SHIPPED | OUTPATIENT
Start: 2024-09-13

## 2024-09-13 RX ORDER — ESTERIFIED ESTROGENS AND METHYLTESTOSTERONE .625; 1.25 MG/1; MG/1
1 TABLET ORAL DAILY
Qty: 90 TABLET | Refills: 1 | Status: SHIPPED | OUTPATIENT
Start: 2024-09-13

## 2024-09-13 RX ORDER — THYROID 60 MG/1
60 TABLET ORAL DAILY
Qty: 90 TABLET | Refills: 0 | Status: SHIPPED | OUTPATIENT
Start: 2024-09-13 | End: 2024-09-13

## 2024-09-13 RX ORDER — SUMATRIPTAN 100 MG/1
100 TABLET, FILM COATED ORAL
Qty: 18 TABLET | Refills: 3 | Status: SHIPPED | OUTPATIENT
Start: 2024-09-13

## 2024-09-13 ASSESSMENT — ANXIETY QUESTIONNAIRES
IF YOU CHECKED OFF ANY PROBLEMS ON THIS QUESTIONNAIRE, HOW DIFFICULT HAVE THESE PROBLEMS MADE IT FOR YOU TO DO YOUR WORK, TAKE CARE OF THINGS AT HOME, OR GET ALONG WITH OTHER PEOPLE: NOT DIFFICULT AT ALL
6. BECOMING EASILY ANNOYED OR IRRITABLE: NOT AT ALL
5. BEING SO RESTLESS THAT IT IS HARD TO SIT STILL: NOT AT ALL
GAD7 TOTAL SCORE: 0
GAD7 TOTAL SCORE: 0
7. FEELING AFRAID AS IF SOMETHING AWFUL MIGHT HAPPEN: NOT AT ALL
1. FEELING NERVOUS, ANXIOUS, OR ON EDGE: NOT AT ALL
3. WORRYING TOO MUCH ABOUT DIFFERENT THINGS: NOT AT ALL
2. NOT BEING ABLE TO STOP OR CONTROL WORRYING: NOT AT ALL

## 2024-09-13 ASSESSMENT — PATIENT HEALTH QUESTIONNAIRE - PHQ9
5. POOR APPETITE OR OVEREATING: NOT AT ALL
SUM OF ALL RESPONSES TO PHQ QUESTIONS 1-9: 1

## 2024-09-16 RX ORDER — FLUCONAZOLE 150 MG/1
150 TABLET ORAL EVERY OTHER DAY
Qty: 2 TABLET | Refills: 0 | Status: SHIPPED | OUTPATIENT
Start: 2024-09-16 | End: 2024-09-19

## 2024-09-18 LAB
TESTOST FREE SERPL-MCNC: 0.35 NG/DL
TESTOST SERPL-MCNC: 22 NG/DL (ref 8–60)

## 2024-10-18 ENCOUNTER — TRANSFERRED RECORDS (OUTPATIENT)
Dept: HEALTH INFORMATION MANAGEMENT | Facility: CLINIC | Age: 54
End: 2024-10-18
Payer: COMMERCIAL

## 2024-10-18 LAB — AST SERPL-CCNC: 31 U/L (ref 10–35)

## 2024-12-16 NOTE — TELEPHONE ENCOUNTER
Requested Prescriptions   Pending Prescriptions Disp Refills    thyroid (ARMOUR) 60 MG tablet 90 tablet 0     Sig: Take 1 tablet (60 mg) by mouth daily.       Thyroid Protocol Failed - 12/16/2024  1:04 PM        Failed - Medication is active on med list        Failed - Medication indicated for associated diagnosis     Medication is associated with one or more of the following diagnoses:  Hypothyroidism  Thyroid stimulating hormone suppression therapy  Thyroid cancer  Acquired atrophy of thyroid          Passed - Patient is 12 years or older        Passed - Recent (12 mo) or future (90 days) visit within the authorizing provider's specialty     The patient must have completed an in-person or virtual visit within the past 12 months or has a future visit scheduled within the next 90 days with the authorizing provider s specialty.  Urgent care and e-visits do not qualify as an office visit for this protocol.          Passed - Normal TSH on file in past 12 months     Recent Labs   Lab Test 09/13/24  1427   TSH 0.97              Passed - No active pregnancy on record     If patient is pregnant or has had a positive pregnancy test, please check TSH.          Passed - No positive pregnancy test in past 12 months     If patient is pregnant or has had a positive pregnancy test, please check TSH.             Last Written Prescription Date:  9/13/24  Last Fill Quantity: 90,  # refills: 0   Last office visit: 9/13/2024 ; last virtual visit: Visit date not found with prescribing provider:  Von   Future Office Visit:      So will do fasting labs today and based on TSH will need armour thyroid refilled     Order discontinued and no longer on pt med list   TSH on 9/13/24 was 0.97     Routing to provider to advise - did you want pt to continue this med?    Lenora Gutierrez RN on 12/16/2024 at 1:17 PM  [facility] T[facility]

## 2024-12-16 NOTE — TELEPHONE ENCOUNTER
Requested Prescriptions   Pending Prescriptions Disp Refills    thyroid (ARMOUR) 60 MG tablet 90 tablet 0     Sig: Take 1 tablet (60 mg) by mouth daily.       Thyroid Protocol Failed - 12/16/2024 12:59 PM        Failed - Medication is active on med list        Failed - Medication indicated for associated diagnosis     Medication is associated with one or more of the following diagnoses:  Hypothyroidism  Thyroid stimulating hormone suppression therapy  Thyroid cancer  Acquired atrophy of thyroid          Passed - Patient is 12 years or older        Passed - Recent (12 mo) or future (90 days) visit within the authorizing provider's specialty     The patient must have completed an in-person or virtual visit within the past 12 months or has a future visit scheduled within the next 90 days with the authorizing provider s specialty.  Urgent care and e-visits do not qualify as an office visit for this protocol.          Passed - Normal TSH on file in past 12 months     Recent Labs   Lab Test 09/13/24  1427   TSH 0.97              Passed - No active pregnancy on record     If patient is pregnant or has had a positive pregnancy test, please check TSH.          Passed - No positive pregnancy test in past 12 months     If patient is pregnant or has had a positive pregnancy test, please check TSH.             Last Written Prescription Date:  not date provided on request.  Last Fill Quantity: 90,  # refills: 0   Last office visit: 9/13/2024 ; last virtual visit: Visit date not found with prescribing provider:  Teresa Longoria MD   Future Office Visit:  no future appointments scheduled.

## 2024-12-17 ENCOUNTER — TELEPHONE (OUTPATIENT)
Dept: OBGYN | Facility: CLINIC | Age: 54
End: 2024-12-17
Payer: COMMERCIAL

## 2024-12-17 DIAGNOSIS — R61 NIGHT SWEATS: ICD-10-CM

## 2024-12-17 DIAGNOSIS — E03.9 ACQUIRED HYPOTHYROIDISM: Primary | ICD-10-CM

## 2024-12-17 RX ORDER — ADALIMUMAB 40MG/0.4ML
KIT SUBCUTANEOUS
COMMUNITY
Start: 2024-04-09

## 2024-12-17 RX ORDER — THYROID 15 MG/1
60 TABLET ORAL DAILY
Qty: 360 TABLET | Refills: 3 | Status: SHIPPED | OUTPATIENT
Start: 2024-12-17

## 2024-12-17 RX ORDER — ESTERIFIED ESTROGENS AND METHYLTESTOSTERONE .625; 1.25 MG/1; MG/1
1 TABLET ORAL DAILY
Qty: 30 TABLET | Refills: 0 | Status: SHIPPED | OUTPATIENT
Start: 2024-12-17

## 2024-12-17 RX ORDER — THYROID 60 MG/1
60 TABLET ORAL DAILY
Qty: 90 TABLET | Refills: 0 | OUTPATIENT
Start: 2024-12-17 | End: 2025-03-17

## 2024-12-17 NOTE — TELEPHONE ENCOUNTER
Prescriptions Alvarado is asking about have been filled today  Radha De Leon RN on 12/17/2024 at 3:33 PM

## 2025-01-14 ENCOUNTER — MYC REFILL (OUTPATIENT)
Dept: OBGYN | Facility: CLINIC | Age: 55
End: 2025-01-14
Payer: COMMERCIAL

## 2025-01-14 DIAGNOSIS — R61 NIGHT SWEATS: Primary | ICD-10-CM

## 2025-01-14 DIAGNOSIS — R61 NIGHT SWEATS: ICD-10-CM

## 2025-01-14 RX ORDER — ESTERIFIED ESTROGENS AND METHYLTESTOSTERONE .625; 1.25 MG/1; MG/1
1 TABLET ORAL DAILY
Qty: 90 TABLET | Refills: 0 | Status: SHIPPED | OUTPATIENT
Start: 2025-01-14

## 2025-01-14 RX ORDER — ESTERIFIED ESTROGENS, METHYLTESTOSTERONE .625; 1.25 MG/1; MG/1
1 TABLET ORAL DAILY
Qty: 90 TABLET | Refills: 0 | Status: SHIPPED | OUTPATIENT
Start: 2025-04-14

## 2025-01-14 NOTE — TELEPHONE ENCOUNTER
Patient requesting a refill at a local [facility] pharmacy - requires new Rx to be sent as original one sent to mail order.    RN not qualified to sign this = routing to Dr Longoria to sign.    Yajaira Noland RN on 1/14/2025 at 2:26 PM

## 2025-01-14 NOTE — PROGRESS NOTES
I sent in rx for her estratest for 3 months on one Rx and then a separate 3 month one to start 3 months from now as well    I can send 3 months at a time but you can't do refills on a controlled substance and since this has T it's considered that  So she won't show refills on the first 3 months RX but another separate 3 month rx was sent today too

## 2025-01-20 ENCOUNTER — TRANSFERRED RECORDS (OUTPATIENT)
Dept: HEALTH INFORMATION MANAGEMENT | Facility: CLINIC | Age: 55
End: 2025-01-20
Payer: COMMERCIAL

## 2025-03-06 ENCOUNTER — MYC MEDICAL ADVICE (OUTPATIENT)
Dept: OBGYN | Facility: CLINIC | Age: 55
End: 2025-03-06
Payer: COMMERCIAL

## 2025-03-06 DIAGNOSIS — E03.9 ACQUIRED HYPOTHYROIDISM: ICD-10-CM

## 2025-03-06 RX ORDER — THYROID 60 MG/1
60 TABLET ORAL DAILY
Qty: 90 TABLET | Refills: 3 | Status: SHIPPED | OUTPATIENT
Start: 2025-03-06

## (undated) DEVICE — SU VICRYL 0 CT-1 CR 8X18" J740D

## (undated) DEVICE — ESU ELEC LOOP ENDOSCOPIC PK 5MMX33CM OLYMPUS SGL WA90300A

## (undated) DEVICE — RETR ELEV / UTERINE MANIPULATOR V-CARE LG CUP 60-6085-202

## (undated) DEVICE — PACK SET-UP STD 9102

## (undated) DEVICE — PREP DURAPREP 26ML APL 8630

## (undated) DEVICE — SPONGE LAP 4X18" X8415

## (undated) DEVICE — ENDO TROCAR SLEEVE KII ADV FIXATION 05X100MM CFS02

## (undated) DEVICE — CATH TRAY FOLEY SURESTEP 16FR WDRAIN BAG STLK LATEX A300316A

## (undated) DEVICE — ESU CORD MONOPOLAR 10'  E0510

## (undated) DEVICE — DEVICE SUTURE GRASPER TROCAR CLOSURE 14GA PMITCSG

## (undated) DEVICE — GLOVE PROTEXIS W/NEU-THERA 6.5  2D73TE65

## (undated) DEVICE — ENDO TROCAR FIRST ENTRY KII FIOS ADV FIX 05X100MM CFF03

## (undated) DEVICE — SU VICRYL 0 UR-6 27" J603H

## (undated) DEVICE — PAD CHUX UNDERPAD 23X24" 7136

## (undated) DEVICE — GLOVE PROTEXIS BLUE W/NEU-THERA 7.0  2D73EB70

## (undated) DEVICE — SU MONOCRYL 4-0 PS-2 18" UND Y496G

## (undated) DEVICE — ESU LIGASURE LAPAROSCOPIC BLUNT TIP SEALER 5MMX37CM LF1837

## (undated) DEVICE — ENDO TROCAR FIRST ENTRY KII FIOS ADV FIX 11X100MM CFF33

## (undated) DEVICE — SURGICEL POWDER ABSORBABLE HEMOSTAT 3GM 3013SP

## (undated) DEVICE — DRSG STERI STRIP 1/4X3" R1541

## (undated) DEVICE — SOL NACL 0.9% INJ 1000ML BAG 2B1324X

## (undated) DEVICE — BLADE KNIFE SURG 15 371115

## (undated) DEVICE — DEVICE ENDO STITCH APPLIER 10MM 173016

## (undated) DEVICE — WIPES FOLEY CARE SURESTEP PROVON DFC100

## (undated) DEVICE — Device

## (undated) DEVICE — APPLICATOR ENDOSCOPIC 5 SURGICEL POWDER 3123SPEA

## (undated) DEVICE — EVAC SYSTEM CLEAR FLOW SC082500

## (undated) DEVICE — GLOVE PROTEXIS W/NEU-THERA 7.0  2D73TE70

## (undated) DEVICE — SUCTION IRR STRYKERFLOW II W/TIP 250-070-520

## (undated) DEVICE — SYR 50ML LL W/O NDL 309653

## (undated) DEVICE — ENDO SCOPE WARMER LF TM500

## (undated) DEVICE — LINEN TOWEL PACK X5 5464

## (undated) DEVICE — SOL WATER IRRIG 1000ML BOTTLE 2F7114

## (undated) DEVICE — ESU HOLDER LAP INST DISP PURPLE LONG 330MM H-PRO-330

## (undated) DEVICE — NDL COUNTER 20CT 31142493

## (undated) DEVICE — SOL WATER IRRIG 3000ML BAG 2B7117

## (undated) RX ORDER — ACETAMINOPHEN 325 MG/1
TABLET ORAL
Status: DISPENSED
Start: 2020-12-08

## (undated) RX ORDER — PROPOFOL 10 MG/ML
INJECTION, EMULSION INTRAVENOUS
Status: DISPENSED
Start: 2020-12-08

## (undated) RX ORDER — KETOROLAC TROMETHAMINE 30 MG/ML
INJECTION, SOLUTION INTRAMUSCULAR; INTRAVENOUS
Status: DISPENSED
Start: 2020-12-08

## (undated) RX ORDER — LIDOCAINE HYDROCHLORIDE 20 MG/ML
INJECTION, SOLUTION EPIDURAL; INFILTRATION; INTRACAUDAL; PERINEURAL
Status: DISPENSED
Start: 2020-12-08

## (undated) RX ORDER — FENTANYL CITRATE 0.05 MG/ML
INJECTION, SOLUTION INTRAMUSCULAR; INTRAVENOUS
Status: DISPENSED
Start: 2020-12-08

## (undated) RX ORDER — FENTANYL CITRATE 50 UG/ML
INJECTION, SOLUTION INTRAMUSCULAR; INTRAVENOUS
Status: DISPENSED
Start: 2020-12-08

## (undated) RX ORDER — ONDANSETRON 2 MG/ML
INJECTION INTRAMUSCULAR; INTRAVENOUS
Status: DISPENSED
Start: 2020-12-08

## (undated) RX ORDER — HYDROMORPHONE HYDROCHLORIDE 1 MG/ML
INJECTION, SOLUTION INTRAMUSCULAR; INTRAVENOUS; SUBCUTANEOUS
Status: DISPENSED
Start: 2020-12-08

## (undated) RX ORDER — FENTANYL CITRATE 50 UG/ML
INJECTION, SOLUTION INTRAMUSCULAR; INTRAVENOUS
Status: DISPENSED
Start: 2019-12-09

## (undated) RX ORDER — OXYCODONE HYDROCHLORIDE 5 MG/1
TABLET ORAL
Status: DISPENSED
Start: 2020-12-08

## (undated) RX ORDER — CLINDAMYCIN PHOSPHATE 900 MG/50ML
INJECTION, SOLUTION INTRAVENOUS
Status: DISPENSED
Start: 2020-12-08

## (undated) RX ORDER — DEXAMETHASONE SODIUM PHOSPHATE 4 MG/ML
INJECTION, SOLUTION INTRA-ARTICULAR; INTRALESIONAL; INTRAMUSCULAR; INTRAVENOUS; SOFT TISSUE
Status: DISPENSED
Start: 2020-12-08

## (undated) RX ORDER — BUPIVACAINE HYDROCHLORIDE 2.5 MG/ML
INJECTION, SOLUTION EPIDURAL; INFILTRATION; INTRACAUDAL
Status: DISPENSED
Start: 2020-12-08

## (undated) RX ORDER — NEOSTIGMINE METHYLSULFATE 1 MG/ML
VIAL (ML) INJECTION
Status: DISPENSED
Start: 2020-12-08

## (undated) RX ORDER — PHENAZOPYRIDINE HYDROCHLORIDE 200 MG/1
TABLET, FILM COATED ORAL
Status: DISPENSED
Start: 2020-12-08

## (undated) RX ORDER — GLYCOPYRROLATE 0.2 MG/ML
INJECTION, SOLUTION INTRAMUSCULAR; INTRAVENOUS
Status: DISPENSED
Start: 2020-12-08